# Patient Record
Sex: MALE | Race: OTHER | Employment: OTHER | URBAN - METROPOLITAN AREA
[De-identification: names, ages, dates, MRNs, and addresses within clinical notes are randomized per-mention and may not be internally consistent; named-entity substitution may affect disease eponyms.]

---

## 2018-01-31 LAB
CHOLESTEROL, TOTAL: 267 MG/DL
CHOLESTEROL/HDL RATIO: 5.8
HDLC SERPL-MCNC: 46 MG/DL (ref 35–70)
LDL CHOLESTEROL CALCULATED: 189 MG/DL (ref 0–160)
TRIGL SERPL-MCNC: 154 MG/DL
VLDLC SERPL CALC-MCNC: 221 MG/DL

## 2018-05-19 ENCOUNTER — APPOINTMENT (OUTPATIENT)
Dept: GENERAL RADIOLOGY | Age: 83
End: 2018-05-19
Payer: MEDICARE

## 2018-05-19 ENCOUNTER — HOSPITAL ENCOUNTER (EMERGENCY)
Age: 83
Discharge: HOME | End: 2018-05-19
Payer: MEDICARE

## 2018-05-19 VITALS
SYSTOLIC BLOOD PRESSURE: 176 MMHG | BODY MASS INDEX: 28.34 KG/M2 | HEART RATE: 100 BPM | HEIGHT: 64 IN | OXYGEN SATURATION: 96 % | WEIGHT: 166 LBS | DIASTOLIC BLOOD PRESSURE: 71 MMHG | TEMPERATURE: 98.6 F | RESPIRATION RATE: 17 BRPM

## 2018-05-19 DIAGNOSIS — S90.111A CONTUSION OF RIGHT GREAT TOE WITHOUT DAMAGE TO NAIL, INITIAL ENCOUNTER: Primary | ICD-10-CM

## 2018-05-19 PROCEDURE — 73630 X-RAY EXAM OF FOOT: CPT

## 2018-05-19 PROCEDURE — 99283 EMERGENCY DEPT VISIT LOW MDM: CPT

## 2019-01-17 ENCOUNTER — OFFICE VISIT (OUTPATIENT)
Dept: ORTHOPEDIC SURGERY | Age: 84
End: 2019-01-17
Payer: MEDICARE

## 2019-01-17 VITALS
DIASTOLIC BLOOD PRESSURE: 68 MMHG | SYSTOLIC BLOOD PRESSURE: 135 MMHG | HEIGHT: 65 IN | HEART RATE: 65 BPM | RESPIRATION RATE: 16 BRPM | WEIGHT: 164 LBS | BODY MASS INDEX: 27.32 KG/M2

## 2019-01-17 DIAGNOSIS — M19.032 ARTHRITIS OF LEFT WRIST: Primary | ICD-10-CM

## 2019-01-17 PROCEDURE — 20605 DRAIN/INJ JOINT/BURSA W/O US: CPT | Performed by: ORTHOPAEDIC SURGERY

## 2019-01-17 PROCEDURE — 99203 OFFICE O/P NEW LOW 30 MIN: CPT | Performed by: ORTHOPAEDIC SURGERY

## 2019-01-17 RX ORDER — BETAMETHASONE SODIUM PHOSPHATE AND BETAMETHASONE ACETATE 3; 3 MG/ML; MG/ML
6 INJECTION, SUSPENSION INTRA-ARTICULAR; INTRALESIONAL; INTRAMUSCULAR; SOFT TISSUE ONCE
Status: COMPLETED | OUTPATIENT
Start: 2019-01-17 | End: 2019-01-17

## 2019-01-17 RX ORDER — LOSARTAN POTASSIUM 100 MG/1
100 TABLET ORAL DAILY
COMMUNITY
End: 2019-08-20 | Stop reason: SDUPTHER

## 2019-01-17 RX ADMIN — BETAMETHASONE SODIUM PHOSPHATE AND BETAMETHASONE ACETATE 6 MG: 3; 3 INJECTION, SUSPENSION INTRA-ARTICULAR; INTRALESIONAL; INTRAMUSCULAR; SOFT TISSUE at 09:17

## 2019-01-31 LAB
AVERAGE GLUCOSE: NORMAL
CHOLESTEROL, TOTAL: 267 MG/DL
CHOLESTEROL/HDL RATIO: 5.8
HBA1C MFR BLD: 11 %
HDLC SERPL-MCNC: 46 MG/DL (ref 35–70)
LDL CHOLESTEROL CALCULATED: 189 MG/DL (ref 0–160)
TRIGL SERPL-MCNC: 154 MG/DL
VLDLC SERPL CALC-MCNC: 221 MG/DL

## 2019-07-25 ENCOUNTER — TELEPHONE (OUTPATIENT)
Dept: PRIMARY CARE CLINIC | Age: 84
End: 2019-07-25

## 2019-07-25 NOTE — TELEPHONE ENCOUNTER
This pt was a previous pt of Dr Kin Good and his cousin Ramón Hilton sees Dr Perez Jacob. During her appt, she asked Dr Perez Jacob if he would see this pt and he agreed. I did confirm with  that he will in fact accept pt. Please call Daniel Goode at [phone] to schedule this appt. Thanks!

## 2019-08-20 ENCOUNTER — OFFICE VISIT (OUTPATIENT)
Dept: PRIMARY CARE CLINIC | Age: 84
End: 2019-08-20
Payer: MEDICARE

## 2019-08-20 VITALS — WEIGHT: 163.4 LBS | BODY MASS INDEX: 27.9 KG/M2 | TEMPERATURE: 98.4 F | HEIGHT: 64 IN

## 2019-08-20 DIAGNOSIS — E10.65 UNCONTROLLED TYPE 1 DIABETES MELLITUS WITH HYPERGLYCEMIA (HCC): ICD-10-CM

## 2019-08-20 DIAGNOSIS — I10 ESSENTIAL HYPERTENSION: Primary | ICD-10-CM

## 2019-08-20 DIAGNOSIS — M1A.09X0 CHRONIC GOUT OF MULTIPLE SITES, UNSPECIFIED CAUSE: ICD-10-CM

## 2019-08-20 DIAGNOSIS — M81.0 AGE-RELATED OSTEOPOROSIS WITHOUT CURRENT PATHOLOGICAL FRACTURE: ICD-10-CM

## 2019-08-20 DIAGNOSIS — Z12.5 PROSTATE CANCER SCREENING: ICD-10-CM

## 2019-08-20 DIAGNOSIS — F51.01 PRIMARY INSOMNIA: ICD-10-CM

## 2019-08-20 DIAGNOSIS — E03.9 ACQUIRED HYPOTHYROIDISM: ICD-10-CM

## 2019-08-20 DIAGNOSIS — E78.2 MIXED HYPERLIPIDEMIA: ICD-10-CM

## 2019-08-20 PROCEDURE — 99214 OFFICE O/P EST MOD 30 MIN: CPT | Performed by: FAMILY MEDICINE

## 2019-08-20 RX ORDER — MIRTAZAPINE 7.5 MG/1
7.5 TABLET, FILM COATED ORAL NIGHTLY
Qty: 90 TABLET | Refills: 1 | Status: SHIPPED | OUTPATIENT
Start: 2019-08-20 | End: 2020-10-16

## 2019-08-20 RX ORDER — LOSARTAN POTASSIUM 100 MG/1
100 TABLET ORAL DAILY
Qty: 90 TABLET | Refills: 5 | Status: SHIPPED | OUTPATIENT
Start: 2019-08-20 | End: 2020-10-16 | Stop reason: SDUPTHER

## 2019-08-20 NOTE — PROGRESS NOTES
injection vial, Inject 60 Units into the skin nightly, Disp: 4 vial, Rfl: 5    mirtazapine (REMERON) 7.5 MG tablet, Take 1 tablet by mouth nightly, Disp: 90 tablet, Rfl: 1  No Known Allergies  Social History     Socioeconomic History    Marital status:      Spouse name: Not on file    Number of children: Not on file    Years of education: Not on file    Highest education level: Not on file   Occupational History    Not on file   Social Needs    Financial resource strain: Not on file    Food insecurity:     Worry: Not on file     Inability: Not on file    Transportation needs:     Medical: Not on file     Non-medical: Not on file   Tobacco Use    Smoking status: Never Smoker    Smokeless tobacco: Never Used   Substance and Sexual Activity    Alcohol use: No    Drug use: No    Sexual activity: Not on file   Lifestyle    Physical activity:     Days per week: Not on file     Minutes per session: Not on file    Stress: Not on file   Relationships    Social connections:     Talks on phone: Not on file     Gets together: Not on file     Attends Latter-day service: Not on file     Active member of club or organization: Not on file     Attends meetings of clubs or organizations: Not on file     Relationship status: Not on file    Intimate partner violence:     Fear of current or ex partner: Not on file     Emotionally abused: Not on file     Physically abused: Not on file     Forced sexual activity: Not on file   Other Topics Concern    Not on file   Social History Narrative     for 10 years. 3 children with his first wife. Diabetes since 1984. Hypertension    Insomnia    Hyperlipidemia with last cholesterol 260 7 January 2019    Diabetic neuropathy    Osteoarthritis    Negative carotid ultrasound August 2018    Constipation taking Linzess every 3 days    Retired     Colonoscopy done few years ago. Osteoarthritis and chronic pain off Vicodin at age 54.   Was on for 17 by mouth nightly    Prostate cancer screening  -     Psa screening; Future        Comments: Him get laboratory studies and see me back in 1week. We will discuss at that time Metamucil his wife says he is reluctant to take anything. He takes Linzess every 3 days and that works well for him. We will do an EKG next visit. He has not been to a cardiologist.  Diagnosis in the records to assess coronary artery disease but no testing to document that. A great deal of time was spent reviewing old records and establishing treatment protocol and treatment plan majority of the time spent on face-to-face exam and education. A great deal of time spent reviewing medications, diet, exercise, social issues. Also reviewing the chart before entering the room with patient and finishing charting after leaving patient's room. More than half of that time was spent face to face with the patient in counseling and coordinating care. Follow Up: Return for 10 days with ekg  that day.      Seen by:  Amna Mathews DO

## 2019-08-21 ASSESSMENT — ENCOUNTER SYMPTOMS
CONSTIPATION: 1
RESPIRATORY NEGATIVE: 1
SHORTNESS OF BREATH: 0
ALLERGIC/IMMUNOLOGIC NEGATIVE: 1
EYES NEGATIVE: 1
CHEST TIGHTNESS: 0

## 2019-08-22 ENCOUNTER — HOSPITAL ENCOUNTER (OUTPATIENT)
Age: 84
Discharge: HOME | End: 2019-08-24
Payer: MEDICARE

## 2019-08-22 DIAGNOSIS — E78.2 MIXED HYPERLIPIDEMIA: ICD-10-CM

## 2019-08-22 DIAGNOSIS — M1A.09X0 CHRONIC GOUT OF MULTIPLE SITES, UNSPECIFIED CAUSE: ICD-10-CM

## 2019-08-22 DIAGNOSIS — I10 ESSENTIAL HYPERTENSION: ICD-10-CM

## 2019-08-22 DIAGNOSIS — E10.65 UNCONTROLLED TYPE 1 DIABETES MELLITUS WITH HYPERGLYCEMIA (HCC): ICD-10-CM

## 2019-08-22 DIAGNOSIS — Z12.5 PROSTATE CANCER SCREENING: ICD-10-CM

## 2019-08-22 DIAGNOSIS — M81.0 AGE-RELATED OSTEOPOROSIS WITHOUT CURRENT PATHOLOGICAL FRACTURE: ICD-10-CM

## 2019-08-22 DIAGNOSIS — E03.9 ACQUIRED HYPOTHYROIDISM: ICD-10-CM

## 2019-08-22 LAB
ALBUMIN SERPL-MCNC: 4.2 G/DL (ref 3.5–5.2)
ALP BLD-CCNC: 69 U/L (ref 40–129)
ALT SERPL-CCNC: 16 U/L (ref 0–40)
ANION GAP SERPL CALCULATED.3IONS-SCNC: 11 MMOL/L (ref 7–16)
AST SERPL-CCNC: 17 U/L (ref 0–39)
BASOPHILS ABSOLUTE: 0.06 E9/L (ref 0–0.2)
BASOPHILS RELATIVE PERCENT: 0.7 % (ref 0–2)
BILIRUB SERPL-MCNC: 0.3 MG/DL (ref 0–1.2)
BILIRUBIN URINE: NEGATIVE
BLOOD, URINE: NEGATIVE
BUN BLDV-MCNC: 17 MG/DL (ref 8–23)
BURR CELLS: ABNORMAL
CALCIUM SERPL-MCNC: 9.4 MG/DL (ref 8.6–10.2)
CHLORIDE BLD-SCNC: 102 MMOL/L (ref 98–107)
CHOLESTEROL, TOTAL: 217 MG/DL (ref 0–199)
CLARITY: CLEAR
CO2: 27 MMOL/L (ref 22–29)
COLOR: YELLOW
CREAT SERPL-MCNC: 1 MG/DL (ref 0.7–1.2)
EOSINOPHILS ABSOLUTE: 2.24 E9/L (ref 0.05–0.5)
EOSINOPHILS RELATIVE PERCENT: 24.9 % (ref 0–6)
GFR AFRICAN AMERICAN: >60
GFR NON-AFRICAN AMERICAN: >60 ML/MIN/1.73
GLUCOSE BLD-MCNC: 145 MG/DL (ref 74–99)
GLUCOSE URINE: NEGATIVE MG/DL
HBA1C MFR BLD: 10.5 % (ref 4–5.6)
HCT VFR BLD CALC: 47.2 % (ref 37–54)
HDLC SERPL-MCNC: 33 MG/DL
HEMOGLOBIN: 14.6 G/DL (ref 12.5–16.5)
IMMATURE GRANULOCYTES #: 0.02 E9/L
IMMATURE GRANULOCYTES %: 0.2 % (ref 0–5)
KETONES, URINE: NEGATIVE MG/DL
LDL CHOLESTEROL CALCULATED: 148 MG/DL (ref 0–99)
LEUKOCYTE ESTERASE, URINE: NEGATIVE
LYMPHOCYTES ABSOLUTE: 2.11 E9/L (ref 1.5–4)
LYMPHOCYTES RELATIVE PERCENT: 23.4 % (ref 20–42)
MCH RBC QN AUTO: 27.2 PG (ref 26–35)
MCHC RBC AUTO-ENTMCNC: 30.9 % (ref 32–34.5)
MCV RBC AUTO: 88.1 FL (ref 80–99.9)
MICROALBUMIN UR-MCNC: 46.7 MG/L
MONOCYTES ABSOLUTE: 0.92 E9/L (ref 0.1–0.95)
MONOCYTES RELATIVE PERCENT: 10.2 % (ref 2–12)
NEUTROPHILS ABSOLUTE: 3.66 E9/L (ref 1.8–7.3)
NEUTROPHILS RELATIVE PERCENT: 40.6 % (ref 43–80)
NITRITE, URINE: NEGATIVE
PDW BLD-RTO: 14.7 FL (ref 11.5–15)
PH UA: 6 (ref 5–9)
PLATELET # BLD: 161 E9/L (ref 130–450)
PMV BLD AUTO: 14.4 FL (ref 7–12)
POIKILOCYTES: ABNORMAL
POTASSIUM SERPL-SCNC: 4.6 MMOL/L (ref 3.5–5)
PROSTATE SPECIFIC ANTIGEN: 1.29 NG/ML (ref 0–4)
PROTEIN UA: NEGATIVE MG/DL
RBC # BLD: 5.36 E12/L (ref 3.8–5.8)
SODIUM BLD-SCNC: 140 MMOL/L (ref 132–146)
SPECIFIC GRAVITY UA: <=1.005 (ref 1–1.03)
T4 TOTAL: 5.9 MCG/DL (ref 4.5–11.7)
TOTAL PROTEIN: 7.7 G/DL (ref 6.4–8.3)
TRIGL SERPL-MCNC: 180 MG/DL (ref 0–149)
TSH SERPL DL<=0.05 MIU/L-ACNC: 3.5 UIU/ML (ref 0.27–4.2)
URIC ACID, SERUM: 5.1 MG/DL (ref 3.4–7)
UROBILINOGEN, URINE: 0.2 E.U./DL
VITAMIN D 25-HYDROXY: 24 NG/ML (ref 30–100)
VLDLC SERPL CALC-MCNC: 36 MG/DL
WBC # BLD: 9 E9/L (ref 4.5–11.5)

## 2019-08-22 PROCEDURE — 85025 COMPLETE CBC W/AUTO DIFF WBC: CPT

## 2019-08-22 PROCEDURE — 84550 ASSAY OF BLOOD/URIC ACID: CPT

## 2019-08-22 PROCEDURE — 82044 UR ALBUMIN SEMIQUANTITATIVE: CPT

## 2019-08-22 PROCEDURE — 80061 LIPID PANEL: CPT

## 2019-08-22 PROCEDURE — 84436 ASSAY OF TOTAL THYROXINE: CPT

## 2019-08-22 PROCEDURE — 36415 COLL VENOUS BLD VENIPUNCTURE: CPT

## 2019-08-22 PROCEDURE — 81003 URINALYSIS AUTO W/O SCOPE: CPT

## 2019-08-22 PROCEDURE — G0103 PSA SCREENING: HCPCS

## 2019-08-22 PROCEDURE — 80053 COMPREHEN METABOLIC PANEL: CPT

## 2019-08-22 PROCEDURE — 83036 HEMOGLOBIN GLYCOSYLATED A1C: CPT

## 2019-08-22 PROCEDURE — 84443 ASSAY THYROID STIM HORMONE: CPT

## 2019-08-22 PROCEDURE — 82306 VITAMIN D 25 HYDROXY: CPT

## 2019-08-26 RX ORDER — BENZONATATE 100 MG/1
100 CAPSULE ORAL 3 TIMES DAILY PRN
COMMUNITY
End: 2019-08-30

## 2019-08-26 RX ORDER — BROMPHENIRAMINE MALEATE, PSEUDOEPHEDRINE HYDROCHLORIDE, AND DEXTROMETHORPHAN HYDROBROMIDE 2; 30; 10 MG/5ML; MG/5ML; MG/5ML
5 SYRUP ORAL
COMMUNITY
End: 2019-08-30

## 2019-08-30 ENCOUNTER — OFFICE VISIT (OUTPATIENT)
Dept: PRIMARY CARE CLINIC | Age: 84
End: 2019-08-30
Payer: MEDICARE

## 2019-08-30 ENCOUNTER — TELEPHONE (OUTPATIENT)
Dept: FAMILY MEDICINE CLINIC | Age: 84
End: 2019-08-30

## 2019-08-30 VITALS — WEIGHT: 164.2 LBS | BODY MASS INDEX: 28.03 KG/M2 | HEIGHT: 64 IN | TEMPERATURE: 97.8 F

## 2019-08-30 DIAGNOSIS — I10 ESSENTIAL HYPERTENSION: ICD-10-CM

## 2019-08-30 DIAGNOSIS — R94.31 EKG ABNORMALITIES: ICD-10-CM

## 2019-08-30 DIAGNOSIS — E10.65 UNCONTROLLED TYPE 1 DIABETES MELLITUS WITH HYPERGLYCEMIA (HCC): Primary | ICD-10-CM

## 2019-08-30 DIAGNOSIS — E78.2 MIXED HYPERLIPIDEMIA: ICD-10-CM

## 2019-08-30 DIAGNOSIS — F51.01 PRIMARY INSOMNIA: ICD-10-CM

## 2019-08-30 DIAGNOSIS — R80.1 PERSISTENT PROTEINURIA: ICD-10-CM

## 2019-08-30 PROCEDURE — 99214 OFFICE O/P EST MOD 30 MIN: CPT | Performed by: FAMILY MEDICINE

## 2019-08-30 PROCEDURE — 93000 ELECTROCARDIOGRAM COMPLETE: CPT | Performed by: FAMILY MEDICINE

## 2019-08-30 RX ORDER — PRAVASTATIN SODIUM 20 MG
20 TABLET ORAL DAILY
Qty: 30 TABLET | Refills: 5 | Status: SHIPPED
Start: 2019-08-30 | End: 2020-03-02 | Stop reason: SDUPTHER

## 2019-08-30 ASSESSMENT — ENCOUNTER SYMPTOMS
ALLERGIC/IMMUNOLOGIC NEGATIVE: 1
RESPIRATORY NEGATIVE: 1
GASTROINTESTINAL NEGATIVE: 1
EYES NEGATIVE: 1

## 2019-09-06 ENCOUNTER — OFFICE VISIT (OUTPATIENT)
Dept: PRIMARY CARE CLINIC | Age: 84
End: 2019-09-06
Payer: MEDICARE

## 2019-09-06 VITALS — WEIGHT: 166 LBS | BODY MASS INDEX: 28.34 KG/M2 | HEIGHT: 64 IN | TEMPERATURE: 97.7 F

## 2019-09-06 DIAGNOSIS — E10.65 UNCONTROLLED TYPE 1 DIABETES MELLITUS WITH HYPERGLYCEMIA (HCC): Primary | ICD-10-CM

## 2019-09-06 LAB
CHP ED QC CHECK: NORMAL
GLUCOSE BLD-MCNC: 221 MG/DL

## 2019-09-06 PROCEDURE — 82962 GLUCOSE BLOOD TEST: CPT | Performed by: FAMILY MEDICINE

## 2019-09-06 PROCEDURE — 99213 OFFICE O/P EST LOW 20 MIN: CPT | Performed by: FAMILY MEDICINE

## 2019-09-06 ASSESSMENT — ENCOUNTER SYMPTOMS
EYES NEGATIVE: 1
RESPIRATORY NEGATIVE: 1
ALLERGIC/IMMUNOLOGIC NEGATIVE: 1
GASTROINTESTINAL NEGATIVE: 1

## 2019-09-06 NOTE — PROGRESS NOTES
room. More than half of that time was spent face to face with the patient in counseling and coordinating care. Follow Up: Return for reg.      Seen by:  Neo Way DO

## 2019-09-17 ENCOUNTER — TELEPHONE (OUTPATIENT)
Dept: ADMINISTRATIVE | Age: 84
End: 2019-09-17

## 2019-09-17 NOTE — TELEPHONE ENCOUNTER
New pt referred by Dr. Brent Sanchez for abnormal EKG. Appt scheduled with Dr. Solomon Epps on 9/18/19. No previous cardiologist.  Cardiac past history form scanned.

## 2019-09-18 ENCOUNTER — OFFICE VISIT (OUTPATIENT)
Dept: CARDIOLOGY CLINIC | Age: 84
End: 2019-09-18
Payer: MEDICARE

## 2019-09-18 ENCOUNTER — HOSPITAL ENCOUNTER (OUTPATIENT)
Age: 84
Discharge: HOME | End: 2019-09-20
Payer: MEDICARE

## 2019-09-18 VITALS
HEART RATE: 78 BPM | RESPIRATION RATE: 16 BRPM | BODY MASS INDEX: 28.2 KG/M2 | DIASTOLIC BLOOD PRESSURE: 68 MMHG | WEIGHT: 165.2 LBS | HEIGHT: 64 IN | SYSTOLIC BLOOD PRESSURE: 130 MMHG

## 2019-09-18 DIAGNOSIS — R94.31 EKG ABNORMALITIES: Primary | ICD-10-CM

## 2019-09-18 DIAGNOSIS — R30.0 DYSURIA: ICD-10-CM

## 2019-09-18 LAB
BACTERIA: ABNORMAL /HPF
BILIRUBIN URINE: NEGATIVE
BLOOD, URINE: ABNORMAL
CLARITY: ABNORMAL
COLOR: YELLOW
GLUCOSE URINE: 250 MG/DL
KETONES, URINE: NEGATIVE MG/DL
LEUKOCYTE ESTERASE, URINE: ABNORMAL
NITRITE, URINE: NEGATIVE
PH UA: 7.5 (ref 5–9)
PROTEIN UA: ABNORMAL MG/DL
RBC UA: ABNORMAL /HPF (ref 0–2)
SPECIFIC GRAVITY UA: 1.01 (ref 1–1.03)
UROBILINOGEN, URINE: 1 E.U./DL
WBC UA: >20 /HPF (ref 0–5)

## 2019-09-18 PROCEDURE — 81001 URINALYSIS AUTO W/SCOPE: CPT

## 2019-09-18 PROCEDURE — 87077 CULTURE AEROBIC IDENTIFY: CPT

## 2019-09-18 PROCEDURE — 87088 URINE BACTERIA CULTURE: CPT

## 2019-09-18 PROCEDURE — 81003 URINALYSIS AUTO W/O SCOPE: CPT | Performed by: INTERNAL MEDICINE

## 2019-09-18 PROCEDURE — 87186 SC STD MICRODIL/AGAR DIL: CPT

## 2019-09-18 PROCEDURE — 99204 OFFICE O/P NEW MOD 45 MIN: CPT | Performed by: INTERNAL MEDICINE

## 2019-09-18 PROCEDURE — 93000 ELECTROCARDIOGRAM COMPLETE: CPT | Performed by: INTERNAL MEDICINE

## 2019-09-18 RX ORDER — M-VIT,TX,IRON,MINS/CALC/FOLIC 27MG-0.4MG
1 TABLET ORAL DAILY
COMMUNITY
End: 2019-11-26

## 2019-09-18 RX ORDER — LATANOPROST 50 UG/ML
1 SOLUTION/ DROPS OPHTHALMIC NIGHTLY
COMMUNITY

## 2019-09-18 NOTE — PROGRESS NOTES
No wheezes, rales, or rhonchi. Cardiac: Regular rhythm with a normal rate. S1 & S2 normal, no murmurs  Abdomen: Soft, nontender, +bowel sounds  Extremities: Moves all extremities x 4, no lower extremity edema  Neurologic: No focal motor deficits apparent, normal mood and affect  Peripheral Pulses: Intact posterior tibial pulses bilaterally    Laboratory Tests:  Lab Results   Component Value Date    CREATININE 1.0 08/22/2019    BUN 17 08/22/2019     08/22/2019    K 4.6 08/22/2019     08/22/2019    CO2 27 08/22/2019     No results found for: MG  Lab Results   Component Value Date    WBC 9.0 08/22/2019    HGB 14.6 08/22/2019    HCT 47.2 08/22/2019    MCV 88.1 08/22/2019     08/22/2019     Lab Results   Component Value Date    ALT 16 08/22/2019    AST 17 08/22/2019    ALKPHOS 69 08/22/2019    BILITOT 0.3 08/22/2019     No results found for: CKTOTAL, CKMB, CKMBINDEX, TROPONINI  No results found for: INR, PROTIME  Lab Results   Component Value Date    TSH 3.500 08/22/2019     Lab Results   Component Value Date    LABA1C 10.5 (H) 08/22/2019     No results found for: EAG  Lab Results   Component Value Date    CHOL 217 (H) 08/22/2019    CHOL 267 01/31/2018     Lab Results   Component Value Date    TRIG 180 (H) 08/22/2019    TRIG 154 01/31/2018     Lab Results   Component Value Date    HDL 33 08/22/2019    HDL 46 01/31/2018     Lab Results   Component Value Date    LDLCALC 148 (H) 08/22/2019    LDLCALC 189 (A) 01/31/2018     Lab Results   Component Value Date    LABVLDL 36 08/22/2019    VLDL 221 01/31/2018     Lab Results   Component Value Date    CHOLHDLRATIO 5.8 01/31/2018     No results for input(s): PROBNP in the last 72 hours. Cardiac Tests:  ECG: Sinus rhythm 78 bpm.  Left bundle branch block with a QRS duration 128 ms with associated repolarization abnormalities.     Echocardiogram: None    Stress test: Thinks he may have had one many years ago    Cardiac catheterization: None    Orders Placed

## 2019-09-19 ENCOUNTER — TELEPHONE (OUTPATIENT)
Dept: CARDIOLOGY CLINIC | Age: 84
End: 2019-09-19

## 2019-09-19 ENCOUNTER — TELEPHONE (OUTPATIENT)
Dept: PRIMARY CARE CLINIC | Age: 84
End: 2019-09-19

## 2019-09-20 RX ORDER — NITROFURANTOIN 25; 75 MG/1; MG/1
100 CAPSULE ORAL 2 TIMES DAILY
Qty: 14 CAPSULE | Refills: 0 | Status: SHIPPED | OUTPATIENT
Start: 2019-09-20 | End: 2019-09-30

## 2019-09-21 LAB
ORGANISM: ABNORMAL
URINE CULTURE, ROUTINE: ABNORMAL

## 2019-09-23 ENCOUNTER — TELEPHONE (OUTPATIENT)
Dept: CARDIOLOGY | Age: 84
End: 2019-09-23

## 2019-09-25 ENCOUNTER — HOSPITAL ENCOUNTER (OUTPATIENT)
Dept: CARDIOLOGY | Age: 84
Discharge: HOME | End: 2019-09-25
Payer: MEDICARE

## 2019-09-25 VITALS
HEIGHT: 64 IN | WEIGHT: 164 LBS | HEART RATE: 75 BPM | BODY MASS INDEX: 28 KG/M2 | DIASTOLIC BLOOD PRESSURE: 74 MMHG | SYSTOLIC BLOOD PRESSURE: 132 MMHG

## 2019-09-25 DIAGNOSIS — R94.31 EKG ABNORMALITIES: ICD-10-CM

## 2019-09-25 PROCEDURE — 78452 HT MUSCLE IMAGE SPECT MULT: CPT

## 2019-09-25 PROCEDURE — A9500 TC99M SESTAMIBI: HCPCS | Performed by: INTERNAL MEDICINE

## 2019-09-25 PROCEDURE — 2580000003 HC RX 258: Performed by: INTERNAL MEDICINE

## 2019-09-25 PROCEDURE — 93017 CV STRESS TEST TRACING ONLY: CPT

## 2019-09-25 PROCEDURE — 6360000002 HC RX W HCPCS: Performed by: INTERNAL MEDICINE

## 2019-09-25 PROCEDURE — 3430000000 HC RX DIAGNOSTIC RADIOPHARMACEUTICAL: Performed by: INTERNAL MEDICINE

## 2019-09-25 RX ORDER — SODIUM CHLORIDE 0.9 % (FLUSH) 0.9 %
10 SYRINGE (ML) INJECTION PRN
Status: DISCONTINUED | OUTPATIENT
Start: 2019-09-25 | End: 2019-09-26 | Stop reason: HOSPADM

## 2019-09-25 RX ADMIN — Medication 10 MILLICURIE: at 06:58

## 2019-09-25 RX ADMIN — REGADENOSON 0.4 MG: 0.08 INJECTION, SOLUTION INTRAVENOUS at 08:40

## 2019-09-25 RX ADMIN — Medication 30 MILLICURIE: at 08:40

## 2019-09-25 RX ADMIN — Medication 10 ML: at 06:58

## 2019-09-25 RX ADMIN — Medication 10 ML: at 08:41

## 2019-09-25 RX ADMIN — Medication 10 ML: at 08:40

## 2019-09-25 NOTE — PROCEDURES
diaphragmatic attenuation. The gated SPECT stress imaging in the short, vertical long, and horizontal long axis demonstrated normal homogeneous tracer distribution throughout the myocardium. A moderate defect was present in the apex wall that was  moderate sized by quantification. The resting images show no change. Gated SPECT left ventricular ejection fraction was calculated to be 44%, with normal myocardial thickening and wall motion and hypokinesis of the apex wall. Impression:    1. ECG during the infusion did not change. 2. The myocardial perfusion imaging was abnormal.    The abnormality was a a moderate sized fixed defect in the apical wall suggestive of a prior MI\  3. Overall left ventricular systolic function was abnormal with regional wall motion abnormalities. 4. Low risk general pharmacologic stress test.    Thank you for sending your patient to this [facility].      Electronically signed by Evie Weathers DO on 9/25/19 at 12:04 PM

## 2019-09-26 DIAGNOSIS — I25.10 CORONARY ARTERY DISEASE INVOLVING NATIVE CORONARY ARTERY OF NATIVE HEART WITHOUT ANGINA PECTORIS: ICD-10-CM

## 2019-09-26 DIAGNOSIS — R94.30 EJECTION FRACTION < 50%: Primary | ICD-10-CM

## 2019-09-26 NOTE — TELEPHONE ENCOUNTER
Left message for patient to contact office. Echo order placed EPIC.   Coreg script sent to [facility] on [address].      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

## 2019-09-30 ENCOUNTER — OFFICE VISIT (OUTPATIENT)
Dept: PRIMARY CARE CLINIC | Age: 84
End: 2019-09-30
Payer: MEDICARE

## 2019-09-30 VITALS — HEIGHT: 64 IN | WEIGHT: 165 LBS | TEMPERATURE: 97.6 F | BODY MASS INDEX: 28.17 KG/M2

## 2019-09-30 DIAGNOSIS — I10 ESSENTIAL HYPERTENSION: Chronic | ICD-10-CM

## 2019-09-30 DIAGNOSIS — I25.10 CORONARY ARTERY DISEASE INVOLVING NATIVE CORONARY ARTERY OF NATIVE HEART WITHOUT ANGINA PECTORIS: ICD-10-CM

## 2019-09-30 DIAGNOSIS — E78.2 MIXED HYPERLIPIDEMIA: Chronic | ICD-10-CM

## 2019-09-30 DIAGNOSIS — E10.65 UNCONTROLLED TYPE 1 DIABETES MELLITUS WITH HYPERGLYCEMIA (HCC): Primary | ICD-10-CM

## 2019-09-30 LAB
CHP ED QC CHECK: NORMAL
GLUCOSE BLD-MCNC: 78 MG/DL

## 2019-09-30 PROCEDURE — 99214 OFFICE O/P EST MOD 30 MIN: CPT | Performed by: FAMILY MEDICINE

## 2019-09-30 PROCEDURE — 90653 IIV ADJUVANT VACCINE IM: CPT | Performed by: FAMILY MEDICINE

## 2019-09-30 PROCEDURE — 82962 GLUCOSE BLOOD TEST: CPT | Performed by: FAMILY MEDICINE

## 2019-09-30 PROCEDURE — G0008 ADMIN INFLUENZA VIRUS VAC: HCPCS | Performed by: FAMILY MEDICINE

## 2019-09-30 RX ORDER — CARVEDILOL 3.12 MG/1
3.12 TABLET ORAL 2 TIMES DAILY
Qty: 60 TABLET | Refills: 5 | Status: SHIPPED | OUTPATIENT
Start: 2019-09-30 | End: 2019-11-26 | Stop reason: DRUGHIGH

## 2019-09-30 ASSESSMENT — PATIENT HEALTH QUESTIONNAIRE - PHQ9
SUM OF ALL RESPONSES TO PHQ QUESTIONS 1-9: 0
2. FEELING DOWN, DEPRESSED OR HOPELESS: 0
SUM OF ALL RESPONSES TO PHQ9 QUESTIONS 1 & 2: 0
1. LITTLE INTEREST OR PLEASURE IN DOING THINGS: 0
SUM OF ALL RESPONSES TO PHQ QUESTIONS 1-9: 0

## 2019-09-30 ASSESSMENT — ENCOUNTER SYMPTOMS
ALLERGIC/IMMUNOLOGIC NEGATIVE: 1
GASTROINTESTINAL NEGATIVE: 1
RESPIRATORY NEGATIVE: 1
EYES NEGATIVE: 1

## 2019-09-30 NOTE — PROGRESS NOTES
normal mood and affect. His behavior is normal.   Vitals reviewed. Montez Cooks was seen today for diabetes. Diagnoses and all orders for this visit:    Uncontrolled type 1 diabetes mellitus with hyperglycemia (HCC)  -     POCT Glucose  -     Insulin Syringes, Disposable, U-100 0.3 ML MISC; 1 each by Does not apply route 3 times daily    Essential hypertension    Mixed hyperlipidemia    Coronary artery disease involving native coronary artery of native heart without angina pectoris        Comments: cont same  Watch   For uti   Return   Await  Eco   Diet e xer   Hm isues    A great deal of time spent reviewing medications, diet, exercise, social issues. Also reviewing the chart before entering the room with patient and finishing charting after leaving patient's room. More than half of that time was spent face to face with the patient in counseling and coordinating care. Follow Up: Return in about 6 weeks (around 11/11/2019) for with bs.      Seen by:  DO Arabella

## 2019-10-04 ENCOUNTER — HOSPITAL ENCOUNTER (EMERGENCY)
Age: 84
Discharge: HOME | End: 2019-10-04
Attending: EMERGENCY MEDICINE
Payer: MEDICARE

## 2019-10-04 ENCOUNTER — APPOINTMENT (OUTPATIENT)
Dept: CT IMAGING | Age: 84
End: 2019-10-04
Payer: MEDICARE

## 2019-10-04 ENCOUNTER — APPOINTMENT (OUTPATIENT)
Dept: GENERAL RADIOLOGY | Age: 84
End: 2019-10-04
Payer: MEDICARE

## 2019-10-04 VITALS
TEMPERATURE: 97.4 F | HEIGHT: 64 IN | RESPIRATION RATE: 16 BRPM | DIASTOLIC BLOOD PRESSURE: 62 MMHG | WEIGHT: 165 LBS | HEART RATE: 80 BPM | SYSTOLIC BLOOD PRESSURE: 145 MMHG | OXYGEN SATURATION: 96 % | BODY MASS INDEX: 28.17 KG/M2

## 2019-10-04 DIAGNOSIS — R07.9 CHEST PAIN, UNSPECIFIED TYPE: ICD-10-CM

## 2019-10-04 DIAGNOSIS — M79.605 LEG PAIN, ANTERIOR, LEFT: Primary | ICD-10-CM

## 2019-10-04 DIAGNOSIS — N30.00 ACUTE CYSTITIS WITHOUT HEMATURIA: ICD-10-CM

## 2019-10-04 LAB
ANION GAP SERPL CALCULATED.3IONS-SCNC: 17 MMOL/L (ref 7–16)
BACTERIA: ABNORMAL /HPF
BILIRUBIN URINE: NEGATIVE
BLOOD, URINE: NEGATIVE
BUN BLDV-MCNC: 15 MG/DL (ref 8–23)
CALCIUM SERPL-MCNC: 8.8 MG/DL (ref 8.6–10.2)
CHLORIDE BLD-SCNC: 98 MMOL/L (ref 98–107)
CLARITY: ABNORMAL
CO2: 21 MMOL/L (ref 22–29)
COLOR: YELLOW
CREAT SERPL-MCNC: 0.9 MG/DL (ref 0.7–1.2)
EKG ATRIAL RATE: 89 BPM
EKG P AXIS: 63 DEGREES
EKG P-R INTERVAL: 150 MS
EKG Q-T INTERVAL: 402 MS
EKG QRS DURATION: 126 MS
EKG QTC CALCULATION (BAZETT): 489 MS
EKG R AXIS: 43 DEGREES
EKG T AXIS: 78 DEGREES
EKG VENTRICULAR RATE: 89 BPM
GFR AFRICAN AMERICAN: >60
GFR NON-AFRICAN AMERICAN: >60 ML/MIN/1.73
GLUCOSE BLD-MCNC: 224 MG/DL (ref 74–99)
GLUCOSE URINE: NEGATIVE MG/DL
HCT VFR BLD CALC: 40.8 % (ref 37–54)
HEMOGLOBIN: 12.8 G/DL (ref 12.5–16.5)
KETONES, URINE: NEGATIVE MG/DL
LEUKOCYTE ESTERASE, URINE: ABNORMAL
MCH RBC QN AUTO: 26.7 PG (ref 26–35)
MCHC RBC AUTO-ENTMCNC: 31.4 % (ref 32–34.5)
MCV RBC AUTO: 85 FL (ref 80–99.9)
NITRITE, URINE: NEGATIVE
PDW BLD-RTO: 14.7 FL (ref 11.5–15)
PH UA: 7 (ref 5–9)
PLATELET # BLD: 193 E9/L (ref 130–450)
PMV BLD AUTO: 13.4 FL (ref 7–12)
POTASSIUM SERPL-SCNC: 4.9 MMOL/L (ref 3.5–5)
PRO-BNP: 374 PG/ML (ref 0–450)
PROTEIN UA: NEGATIVE MG/DL
RBC # BLD: 4.8 E12/L (ref 3.8–5.8)
RBC UA: ABNORMAL /HPF (ref 0–2)
SODIUM BLD-SCNC: 136 MMOL/L (ref 132–146)
SPECIFIC GRAVITY UA: 1.01 (ref 1–1.03)
TROPONIN: <0.01 NG/ML (ref 0–0.03)
UROBILINOGEN, URINE: 0.2 E.U./DL
WBC # BLD: 18.6 E9/L (ref 4.5–11.5)
WBC UA: >20 /HPF (ref 0–5)

## 2019-10-04 PROCEDURE — 71045 X-RAY EXAM CHEST 1 VIEW: CPT

## 2019-10-04 PROCEDURE — 2580000003 HC RX 258: Performed by: RADIOLOGY

## 2019-10-04 PROCEDURE — 93010 ELECTROCARDIOGRAM REPORT: CPT | Performed by: INTERNAL MEDICINE

## 2019-10-04 PROCEDURE — 99285 EMERGENCY DEPT VISIT HI MDM: CPT

## 2019-10-04 PROCEDURE — 83880 ASSAY OF NATRIURETIC PEPTIDE: CPT

## 2019-10-04 PROCEDURE — 6370000000 HC RX 637 (ALT 250 FOR IP): Performed by: PHYSICIAN ASSISTANT

## 2019-10-04 PROCEDURE — 36415 COLL VENOUS BLD VENIPUNCTURE: CPT

## 2019-10-04 PROCEDURE — 85027 COMPLETE CBC AUTOMATED: CPT

## 2019-10-04 PROCEDURE — 71275 CT ANGIOGRAPHY CHEST: CPT

## 2019-10-04 PROCEDURE — 84484 ASSAY OF TROPONIN QUANT: CPT

## 2019-10-04 PROCEDURE — 80048 BASIC METABOLIC PNL TOTAL CA: CPT

## 2019-10-04 PROCEDURE — 81001 URINALYSIS AUTO W/SCOPE: CPT

## 2019-10-04 PROCEDURE — 93005 ELECTROCARDIOGRAM TRACING: CPT | Performed by: PHYSICIAN ASSISTANT

## 2019-10-04 PROCEDURE — 6360000004 HC RX CONTRAST MEDICATION: Performed by: RADIOLOGY

## 2019-10-04 RX ORDER — ASPIRIN 81 MG/1
324 TABLET, CHEWABLE ORAL ONCE
Status: COMPLETED | OUTPATIENT
Start: 2019-10-04 | End: 2019-10-04

## 2019-10-04 RX ORDER — CEFDINIR 300 MG/1
300 CAPSULE ORAL 2 TIMES DAILY
Qty: 20 CAPSULE | Refills: 0 | Status: SHIPPED | OUTPATIENT
Start: 2019-10-04 | End: 2019-10-14

## 2019-10-04 RX ORDER — SODIUM CHLORIDE 0.9 % (FLUSH) 0.9 %
10 SYRINGE (ML) INJECTION PRN
Status: DISCONTINUED | OUTPATIENT
Start: 2019-10-04 | End: 2019-10-04 | Stop reason: HOSPADM

## 2019-10-04 RX ADMIN — Medication 10 ML: at 04:14

## 2019-10-04 RX ADMIN — IOPAMIDOL 60 ML: 755 INJECTION, SOLUTION INTRAVENOUS at 04:14

## 2019-10-04 RX ADMIN — ASPIRIN 81 MG CHEWABLE TABLET 324 MG: 81 TABLET CHEWABLE at 03:05

## 2019-10-04 ASSESSMENT — PAIN DESCRIPTION - DESCRIPTORS
DESCRIPTORS: OTHER (COMMENT)
DESCRIPTORS: SHOOTING

## 2019-10-04 ASSESSMENT — PAIN DESCRIPTION - FREQUENCY
FREQUENCY: CONTINUOUS
FREQUENCY: CONTINUOUS

## 2019-10-04 ASSESSMENT — PAIN SCALES - GENERAL
PAINLEVEL_OUTOF10: 8
PAINLEVEL_OUTOF10: 6

## 2019-10-04 ASSESSMENT — PAIN DESCRIPTION - LOCATION
LOCATION: LEG
LOCATION: CHEST;LEG;ABDOMEN

## 2019-10-04 ASSESSMENT — PAIN DESCRIPTION - PROGRESSION
CLINICAL_PROGRESSION: GRADUALLY IMPROVING
CLINICAL_PROGRESSION: NOT CHANGED

## 2019-10-04 ASSESSMENT — PAIN DESCRIPTION - ORIENTATION
ORIENTATION: LEFT
ORIENTATION: LEFT

## 2019-10-04 ASSESSMENT — PAIN DESCRIPTION - PAIN TYPE
TYPE: ACUTE PAIN
TYPE: ACUTE PAIN

## 2019-10-04 ASSESSMENT — PAIN DESCRIPTION - ONSET: ONSET: ON-GOING

## 2019-10-17 ENCOUNTER — TELEPHONE (OUTPATIENT)
Dept: CARDIOLOGY | Age: 84
End: 2019-10-17

## 2019-10-21 ENCOUNTER — HOSPITAL ENCOUNTER (OUTPATIENT)
Dept: CARDIOLOGY | Age: 84
Discharge: HOME | End: 2019-10-21
Payer: MEDICARE

## 2019-10-21 DIAGNOSIS — R94.30 EJECTION FRACTION < 50%: ICD-10-CM

## 2019-10-21 DIAGNOSIS — I25.10 CORONARY ARTERY DISEASE INVOLVING NATIVE CORONARY ARTERY OF NATIVE HEART WITHOUT ANGINA PECTORIS: ICD-10-CM

## 2019-10-21 LAB
LV EF: 50 %
LVEF MODALITY: NORMAL

## 2019-10-21 PROCEDURE — 93306 TTE W/DOPPLER COMPLETE: CPT

## 2019-10-23 ENCOUNTER — TELEPHONE (OUTPATIENT)
Dept: CARDIOLOGY CLINIC | Age: 84
End: 2019-10-23

## 2019-11-11 ENCOUNTER — OFFICE VISIT (OUTPATIENT)
Dept: PRIMARY CARE CLINIC | Age: 84
End: 2019-11-11
Payer: MEDICARE

## 2019-11-11 ENCOUNTER — HOSPITAL ENCOUNTER (OUTPATIENT)
Age: 84
Discharge: HOME | End: 2019-11-13
Payer: MEDICARE

## 2019-11-11 DIAGNOSIS — E10.65 UNCONTROLLED TYPE 1 DIABETES MELLITUS WITH HYPERGLYCEMIA (HCC): Primary | ICD-10-CM

## 2019-11-11 DIAGNOSIS — N30.00 ACUTE CYSTITIS WITHOUT HEMATURIA: ICD-10-CM

## 2019-11-11 DIAGNOSIS — Z23 NEED FOR PNEUMOCOCCAL VACCINATION: ICD-10-CM

## 2019-11-11 DIAGNOSIS — E10.65 UNCONTROLLED TYPE 1 DIABETES MELLITUS WITH HYPERGLYCEMIA (HCC): ICD-10-CM

## 2019-11-11 LAB
ALBUMIN SERPL-MCNC: 4.4 G/DL (ref 3.5–5.2)
ALP BLD-CCNC: 67 U/L (ref 40–129)
ALT SERPL-CCNC: 23 U/L (ref 0–40)
ANION GAP SERPL CALCULATED.3IONS-SCNC: 14 MMOL/L (ref 7–16)
AST SERPL-CCNC: 24 U/L (ref 0–39)
BACTERIA: ABNORMAL /HPF
BASOPHILS ABSOLUTE: 0.06 E9/L (ref 0–0.2)
BASOPHILS RELATIVE PERCENT: 0.7 % (ref 0–2)
BILIRUB SERPL-MCNC: 0.5 MG/DL (ref 0–1.2)
BILIRUBIN URINE: NEGATIVE
BLOOD, URINE: ABNORMAL
BUN BLDV-MCNC: 17 MG/DL (ref 8–23)
CALCIUM SERPL-MCNC: 9.7 MG/DL (ref 8.6–10.2)
CHLORIDE BLD-SCNC: 102 MMOL/L (ref 98–107)
CHOLESTEROL, TOTAL: 164 MG/DL (ref 0–199)
CHP ED QC CHECK: NORMAL
CLARITY: ABNORMAL
CO2: 27 MMOL/L (ref 22–29)
COLOR: YELLOW
CREAT SERPL-MCNC: 0.9 MG/DL (ref 0.7–1.2)
EOSINOPHILS ABSOLUTE: 1.36 E9/L (ref 0.05–0.5)
EOSINOPHILS RELATIVE PERCENT: 16.1 % (ref 0–6)
GFR AFRICAN AMERICAN: >60
GFR NON-AFRICAN AMERICAN: >60 ML/MIN/1.73
GLUCOSE BLD-MCNC: 103 MG/DL
GLUCOSE BLD-MCNC: 124 MG/DL (ref 74–99)
GLUCOSE URINE: NEGATIVE MG/DL
HBA1C MFR BLD: 8.6 % (ref 4–5.6)
HCT VFR BLD CALC: 45.9 % (ref 37–54)
HDLC SERPL-MCNC: 42 MG/DL
HEMOGLOBIN: 13.8 G/DL (ref 12.5–16.5)
IMMATURE GRANULOCYTES #: 0.01 E9/L
IMMATURE GRANULOCYTES %: 0.1 % (ref 0–5)
KETONES, URINE: NEGATIVE MG/DL
LDL CHOLESTEROL CALCULATED: 101 MG/DL (ref 0–99)
LEUKOCYTE ESTERASE, URINE: ABNORMAL
LYMPHOCYTES ABSOLUTE: 2.12 E9/L (ref 1.5–4)
LYMPHOCYTES RELATIVE PERCENT: 25.1 % (ref 20–42)
MCH RBC QN AUTO: 27 PG (ref 26–35)
MCHC RBC AUTO-ENTMCNC: 30.1 % (ref 32–34.5)
MCV RBC AUTO: 89.8 FL (ref 80–99.9)
MONOCYTES ABSOLUTE: 0.94 E9/L (ref 0.1–0.95)
MONOCYTES RELATIVE PERCENT: 11.1 % (ref 2–12)
NEUTROPHILS ABSOLUTE: 3.96 E9/L (ref 1.8–7.3)
NEUTROPHILS RELATIVE PERCENT: 46.9 % (ref 43–80)
NITRITE, URINE: NEGATIVE
PDW BLD-RTO: 15 FL (ref 11.5–15)
PH UA: 7 (ref 5–9)
PLATELET # BLD: 61 E9/L (ref 130–450)
PLATELET CONFIRMATION: NORMAL
PMV BLD AUTO: 14.4 FL (ref 7–12)
POTASSIUM SERPL-SCNC: 5.3 MMOL/L (ref 3.5–5)
PROTEIN UA: ABNORMAL MG/DL
RBC # BLD: 5.11 E12/L (ref 3.8–5.8)
RBC UA: ABNORMAL /HPF (ref 0–2)
SODIUM BLD-SCNC: 143 MMOL/L (ref 132–146)
SPECIFIC GRAVITY UA: 1.01 (ref 1–1.03)
TOTAL PROTEIN: 8.2 G/DL (ref 6.4–8.3)
TRIGL SERPL-MCNC: 106 MG/DL (ref 0–149)
UROBILINOGEN, URINE: 0.2 E.U./DL
VLDLC SERPL CALC-MCNC: 21 MG/DL
WBC # BLD: 8.5 E9/L (ref 4.5–11.5)
WBC UA: >20 /HPF (ref 0–5)

## 2019-11-11 PROCEDURE — 36415 COLL VENOUS BLD VENIPUNCTURE: CPT

## 2019-11-11 PROCEDURE — 81001 URINALYSIS AUTO W/SCOPE: CPT

## 2019-11-11 PROCEDURE — 87088 URINE BACTERIA CULTURE: CPT

## 2019-11-11 PROCEDURE — 87186 SC STD MICRODIL/AGAR DIL: CPT

## 2019-11-11 PROCEDURE — G0009 ADMIN PNEUMOCOCCAL VACCINE: HCPCS | Performed by: FAMILY MEDICINE

## 2019-11-11 PROCEDURE — 83036 HEMOGLOBIN GLYCOSYLATED A1C: CPT

## 2019-11-11 PROCEDURE — 80061 LIPID PANEL: CPT

## 2019-11-11 PROCEDURE — 90732 PPSV23 VACC 2 YRS+ SUBQ/IM: CPT | Performed by: FAMILY MEDICINE

## 2019-11-11 PROCEDURE — 82962 GLUCOSE BLOOD TEST: CPT | Performed by: FAMILY MEDICINE

## 2019-11-11 PROCEDURE — 85025 COMPLETE CBC W/AUTO DIFF WBC: CPT

## 2019-11-11 PROCEDURE — 99213 OFFICE O/P EST LOW 20 MIN: CPT | Performed by: FAMILY MEDICINE

## 2019-11-11 PROCEDURE — 87077 CULTURE AEROBIC IDENTIFY: CPT

## 2019-11-11 PROCEDURE — 80053 COMPREHEN METABOLIC PANEL: CPT

## 2019-11-11 ASSESSMENT — ENCOUNTER SYMPTOMS
RESPIRATORY NEGATIVE: 1
GASTROINTESTINAL NEGATIVE: 1
EYES NEGATIVE: 1
ALLERGIC/IMMUNOLOGIC NEGATIVE: 1

## 2019-11-13 VITALS
WEIGHT: 162 LBS | DIASTOLIC BLOOD PRESSURE: 78 MMHG | SYSTOLIC BLOOD PRESSURE: 127 MMHG | HEIGHT: 64 IN | BODY MASS INDEX: 27.66 KG/M2

## 2019-11-13 ASSESSMENT — PATIENT HEALTH QUESTIONNAIRE - PHQ9
SUM OF ALL RESPONSES TO PHQ QUESTIONS 1-9: 0
2. FEELING DOWN, DEPRESSED OR HOPELESS: 0
SUM OF ALL RESPONSES TO PHQ QUESTIONS 1-9: 0
SUM OF ALL RESPONSES TO PHQ9 QUESTIONS 1 & 2: 0
1. LITTLE INTEREST OR PLEASURE IN DOING THINGS: 0

## 2019-11-15 LAB
ORGANISM: ABNORMAL
URINE CULTURE, ROUTINE: ABNORMAL

## 2019-11-18 RX ORDER — CIPROFLOXACIN 250 MG/1
250 TABLET, FILM COATED ORAL 2 TIMES DAILY
Qty: 14 TABLET | Refills: 0 | Status: SHIPPED | OUTPATIENT
Start: 2019-11-18 | End: 2019-11-26

## 2019-11-26 ENCOUNTER — OFFICE VISIT (OUTPATIENT)
Dept: CARDIOLOGY CLINIC | Age: 84
End: 2019-11-26
Payer: MEDICARE

## 2019-11-26 VITALS
WEIGHT: 162 LBS | SYSTOLIC BLOOD PRESSURE: 130 MMHG | BODY MASS INDEX: 27.66 KG/M2 | RESPIRATION RATE: 16 BRPM | DIASTOLIC BLOOD PRESSURE: 70 MMHG | HEART RATE: 74 BPM | HEIGHT: 64 IN

## 2019-11-26 DIAGNOSIS — I25.10 CORONARY ARTERY DISEASE INVOLVING NATIVE CORONARY ARTERY OF NATIVE HEART WITHOUT ANGINA PECTORIS: Primary | ICD-10-CM

## 2019-11-26 DIAGNOSIS — I44.7 LBBB (LEFT BUNDLE BRANCH BLOCK): ICD-10-CM

## 2019-11-26 PROCEDURE — 93000 ELECTROCARDIOGRAM COMPLETE: CPT | Performed by: INTERNAL MEDICINE

## 2019-11-26 PROCEDURE — 99214 OFFICE O/P EST MOD 30 MIN: CPT | Performed by: INTERNAL MEDICINE

## 2019-11-26 RX ORDER — CARVEDILOL 6.25 MG/1
6.25 TABLET ORAL 2 TIMES DAILY
Qty: 60 TABLET | Refills: 3 | Status: SHIPPED | OUTPATIENT
Start: 2019-11-26 | End: 2019-12-04 | Stop reason: SDUPTHER

## 2019-12-04 ENCOUNTER — OFFICE VISIT (OUTPATIENT)
Dept: PRIMARY CARE CLINIC | Age: 84
End: 2019-12-04
Payer: MEDICARE

## 2019-12-04 VITALS
WEIGHT: 162 LBS | DIASTOLIC BLOOD PRESSURE: 68 MMHG | SYSTOLIC BLOOD PRESSURE: 114 MMHG | OXYGEN SATURATION: 98 % | HEART RATE: 65 BPM | HEIGHT: 64 IN | RESPIRATION RATE: 16 BRPM | BODY MASS INDEX: 27.66 KG/M2

## 2019-12-04 DIAGNOSIS — Z00.00 ENCOUNTER FOR GENERAL MEDICAL EXAMINATION: Primary | ICD-10-CM

## 2019-12-04 DIAGNOSIS — Z00.00 ROUTINE GENERAL MEDICAL EXAMINATION AT A HEALTH CARE FACILITY: ICD-10-CM

## 2019-12-04 PROCEDURE — G0438 PPPS, INITIAL VISIT: HCPCS | Performed by: PHYSICIAN ASSISTANT

## 2019-12-04 ASSESSMENT — PATIENT HEALTH QUESTIONNAIRE - PHQ9
SUM OF ALL RESPONSES TO PHQ QUESTIONS 1-9: 1
SUM OF ALL RESPONSES TO PHQ QUESTIONS 1-9: 1

## 2019-12-04 ASSESSMENT — LIFESTYLE VARIABLES: HOW OFTEN DO YOU HAVE A DRINK CONTAINING ALCOHOL: 0

## 2019-12-05 RX ORDER — CARVEDILOL 6.25 MG/1
6.25 TABLET ORAL 2 TIMES DAILY
Qty: 60 TABLET | Refills: 5 | Status: SHIPPED
Start: 2019-12-05 | End: 2020-06-01 | Stop reason: SDUPTHER

## 2020-01-14 ENCOUNTER — OFFICE VISIT (OUTPATIENT)
Dept: FAMILY MEDICINE CLINIC | Age: 85
End: 2020-01-14
Payer: MEDICARE

## 2020-01-14 VITALS
TEMPERATURE: 97.8 F | OXYGEN SATURATION: 97 % | HEIGHT: 64 IN | HEART RATE: 71 BPM | WEIGHT: 160 LBS | RESPIRATION RATE: 20 BRPM | BODY MASS INDEX: 27.31 KG/M2 | DIASTOLIC BLOOD PRESSURE: 76 MMHG | SYSTOLIC BLOOD PRESSURE: 140 MMHG

## 2020-01-14 PROCEDURE — 99214 OFFICE O/P EST MOD 30 MIN: CPT | Performed by: PHYSICIAN ASSISTANT

## 2020-01-14 RX ORDER — BENZONATATE 100 MG/1
100 CAPSULE ORAL 3 TIMES DAILY PRN
Qty: 21 CAPSULE | Refills: 0 | Status: SHIPPED | OUTPATIENT
Start: 2020-01-14 | End: 2020-01-21

## 2020-01-14 RX ORDER — DOXYCYCLINE HYCLATE 100 MG
100 TABLET ORAL 2 TIMES DAILY
Qty: 20 TABLET | Refills: 0 | Status: SHIPPED | OUTPATIENT
Start: 2020-01-14 | End: 2020-01-24
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
## 2020-02-03 ENCOUNTER — HOSPITAL ENCOUNTER (OUTPATIENT)
Age: 85
Discharge: HOME | End: 2020-02-05
Payer: MEDICARE

## 2020-02-03 ENCOUNTER — TELEPHONE (OUTPATIENT)
Dept: PRIMARY CARE CLINIC | Age: 85
End: 2020-02-03

## 2020-02-03 LAB
ALBUMIN SERPL-MCNC: 4.1 G/DL (ref 3.5–5.2)
ALP BLD-CCNC: 65 U/L (ref 40–129)
ALT SERPL-CCNC: 23 U/L (ref 0–40)
ANION GAP SERPL CALCULATED.3IONS-SCNC: 12 MMOL/L (ref 7–16)
AST SERPL-CCNC: 17 U/L (ref 0–39)
BASOPHILS ABSOLUTE: 0.07 E9/L (ref 0–0.2)
BASOPHILS RELATIVE PERCENT: 0.9 % (ref 0–2)
BILIRUB SERPL-MCNC: 0.3 MG/DL (ref 0–1.2)
BILIRUBIN URINE: NEGATIVE
BLOOD, URINE: NEGATIVE
BUN BLDV-MCNC: 16 MG/DL (ref 8–23)
CALCIUM SERPL-MCNC: 9.7 MG/DL (ref 8.6–10.2)
CHLORIDE BLD-SCNC: 102 MMOL/L (ref 98–107)
CHOLESTEROL, TOTAL: 154 MG/DL (ref 0–199)
CLARITY: CLEAR
CO2: 26 MMOL/L (ref 22–29)
COLOR: YELLOW
CREAT SERPL-MCNC: 0.9 MG/DL (ref 0.7–1.2)
EOSINOPHILS ABSOLUTE: 0.82 E9/L (ref 0.05–0.5)
EOSINOPHILS RELATIVE PERCENT: 10.1 % (ref 0–6)
GFR AFRICAN AMERICAN: >60
GFR NON-AFRICAN AMERICAN: >60 ML/MIN/1.73
GLUCOSE BLD-MCNC: 134 MG/DL (ref 74–99)
GLUCOSE URINE: NEGATIVE MG/DL
HBA1C MFR BLD: 9.2 % (ref 4–5.6)
HCT VFR BLD CALC: 46.3 % (ref 37–54)
HDLC SERPL-MCNC: 40 MG/DL
HEMOGLOBIN: 14 G/DL (ref 12.5–16.5)
IMMATURE GRANULOCYTES #: 0.02 E9/L
IMMATURE GRANULOCYTES %: 0.2 % (ref 0–5)
KETONES, URINE: NEGATIVE MG/DL
LDL CHOLESTEROL CALCULATED: 98 MG/DL (ref 0–99)
LEUKOCYTE ESTERASE, URINE: NEGATIVE
LYMPHOCYTES ABSOLUTE: 2.16 E9/L (ref 1.5–4)
LYMPHOCYTES RELATIVE PERCENT: 26.6 % (ref 20–42)
MCH RBC QN AUTO: 27.1 PG (ref 26–35)
MCHC RBC AUTO-ENTMCNC: 30.2 % (ref 32–34.5)
MCV RBC AUTO: 89.6 FL (ref 80–99.9)
MICROALBUMIN UR-MCNC: 115.2 MG/L
MONOCYTES ABSOLUTE: 0.98 E9/L (ref 0.1–0.95)
MONOCYTES RELATIVE PERCENT: 12.1 % (ref 2–12)
NEUTROPHILS ABSOLUTE: 4.08 E9/L (ref 1.8–7.3)
NEUTROPHILS RELATIVE PERCENT: 50.1 % (ref 43–80)
NITRITE, URINE: NEGATIVE
PDW BLD-RTO: 14.3 FL (ref 11.5–15)
PH UA: 6.5 (ref 5–9)
PLATELET # BLD: 72 E9/L (ref 130–450)
PLATELET CONFIRMATION: NORMAL
PMV BLD AUTO: 13.5 FL (ref 7–12)
POTASSIUM SERPL-SCNC: 5.2 MMOL/L (ref 3.5–5)
PROTEIN UA: NEGATIVE MG/DL
RBC # BLD: 5.17 E12/L (ref 3.8–5.8)
SODIUM BLD-SCNC: 140 MMOL/L (ref 132–146)
SPECIFIC GRAVITY UA: 1.02 (ref 1–1.03)
TOTAL PROTEIN: 8.3 G/DL (ref 6.4–8.3)
TRIGL SERPL-MCNC: 82 MG/DL (ref 0–149)
UROBILINOGEN, URINE: 0.2 E.U./DL
VLDLC SERPL CALC-MCNC: 16 MG/DL
WBC # BLD: 8.1 E9/L (ref 4.5–11.5)

## 2020-02-03 PROCEDURE — 36415 COLL VENOUS BLD VENIPUNCTURE: CPT

## 2020-02-03 PROCEDURE — 82044 UR ALBUMIN SEMIQUANTITATIVE: CPT

## 2020-02-03 PROCEDURE — 81003 URINALYSIS AUTO W/O SCOPE: CPT

## 2020-02-03 PROCEDURE — 83036 HEMOGLOBIN GLYCOSYLATED A1C: CPT

## 2020-02-03 PROCEDURE — 80061 LIPID PANEL: CPT

## 2020-02-03 PROCEDURE — 80053 COMPREHEN METABOLIC PANEL: CPT

## 2020-02-03 PROCEDURE — 85025 COMPLETE CBC W/AUTO DIFF WBC: CPT

## 2020-02-10 ENCOUNTER — OFFICE VISIT (OUTPATIENT)
Dept: PRIMARY CARE CLINIC | Age: 85
End: 2020-02-10
Payer: MEDICARE

## 2020-02-10 VITALS
HEIGHT: 64 IN | TEMPERATURE: 97.7 F | DIASTOLIC BLOOD PRESSURE: 82 MMHG | WEIGHT: 160 LBS | SYSTOLIC BLOOD PRESSURE: 128 MMHG | BODY MASS INDEX: 27.31 KG/M2

## 2020-02-10 PROBLEM — I25.10 CORONARY ARTERY DISEASE INVOLVING NATIVE CORONARY ARTERY OF NATIVE HEART WITHOUT ANGINA PECTORIS: Chronic | Status: ACTIVE | Noted: 2019-09-30

## 2020-02-10 PROCEDURE — 99214 OFFICE O/P EST MOD 30 MIN: CPT | Performed by: FAMILY MEDICINE

## 2020-02-10 RX ORDER — GLUCOSAMINE HCL/CHONDROITIN SU 500-400 MG
CAPSULE ORAL
Qty: 100 STRIP | Refills: 12 | Status: SHIPPED
Start: 2020-02-10 | End: 2021-03-03 | Stop reason: SDUPTHER

## 2020-02-10 ASSESSMENT — ENCOUNTER SYMPTOMS
GASTROINTESTINAL NEGATIVE: 1
ALLERGIC/IMMUNOLOGIC NEGATIVE: 1
RESPIRATORY NEGATIVE: 1
EYES NEGATIVE: 1
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
## 2020-03-02 RX ORDER — PRAVASTATIN SODIUM 20 MG
20 TABLET ORAL DAILY
Qty: 90 TABLET | Refills: 3 | Status: SHIPPED
Start: 2020-03-02 | End: 2021-02-10 | Stop reason: SDUPTHER

## 2020-04-20 ENCOUNTER — APPOINTMENT (OUTPATIENT)
Dept: GENERAL RADIOLOGY | Age: 85
End: 2020-04-20
Payer: MEDICARE

## 2020-04-20 ENCOUNTER — HOSPITAL ENCOUNTER (EMERGENCY)
Age: 85
Discharge: HOME | End: 2020-04-20
Attending: EMERGENCY MEDICINE
Payer: MEDICARE

## 2020-04-20 VITALS
BODY MASS INDEX: 27.46 KG/M2 | RESPIRATION RATE: 16 BRPM | HEART RATE: 64 BPM | WEIGHT: 160 LBS | DIASTOLIC BLOOD PRESSURE: 68 MMHG | OXYGEN SATURATION: 99 % | TEMPERATURE: 96.9 F | SYSTOLIC BLOOD PRESSURE: 168 MMHG

## 2020-04-20 LAB
ALBUMIN SERPL-MCNC: 4.3 G/DL (ref 3.5–5.2)
ALP BLD-CCNC: 75 U/L (ref 40–129)
ALT SERPL-CCNC: 18 U/L (ref 0–40)
ANION GAP SERPL CALCULATED.3IONS-SCNC: 13 MMOL/L (ref 7–16)
AST SERPL-CCNC: 16 U/L (ref 0–39)
BILIRUB SERPL-MCNC: 0.7 MG/DL (ref 0–1.2)
BUN BLDV-MCNC: 15 MG/DL (ref 8–23)
CALCIUM SERPL-MCNC: 9.8 MG/DL (ref 8.6–10.2)
CHLORIDE BLD-SCNC: 99 MMOL/L (ref 98–107)
CO2: 26 MMOL/L (ref 22–29)
CREAT SERPL-MCNC: 0.7 MG/DL (ref 0.7–1.2)
GFR AFRICAN AMERICAN: >60
GFR NON-AFRICAN AMERICAN: >60 ML/MIN/1.73
GLUCOSE BLD-MCNC: 229 MG/DL (ref 74–99)
HCT VFR BLD CALC: 45.1 % (ref 37–54)
HEMOGLOBIN: 14.3 G/DL (ref 12.5–16.5)
MCH RBC QN AUTO: 27.3 PG (ref 26–35)
MCHC RBC AUTO-ENTMCNC: 31.7 % (ref 32–34.5)
MCV RBC AUTO: 86.1 FL (ref 80–99.9)
PDW BLD-RTO: 14.4 FL (ref 11.5–15)
PLATELET # BLD: 201 E9/L (ref 130–450)
PMV BLD AUTO: 13.7 FL (ref 7–12)
POTASSIUM SERPL-SCNC: 4.1 MMOL/L (ref 3.5–5)
PRO-BNP: 424 PG/ML (ref 0–450)
RBC # BLD: 5.24 E12/L (ref 3.8–5.8)
SODIUM BLD-SCNC: 138 MMOL/L (ref 132–146)
TOTAL PROTEIN: 7.8 G/DL (ref 6.4–8.3)
TROPONIN: <0.01 NG/ML (ref 0–0.03)
WBC # BLD: 11.1 E9/L (ref 4.5–11.5)

## 2020-04-20 PROCEDURE — 80053 COMPREHEN METABOLIC PANEL: CPT

## 2020-04-20 PROCEDURE — 85027 COMPLETE CBC AUTOMATED: CPT

## 2020-04-20 PROCEDURE — 93005 ELECTROCARDIOGRAM TRACING: CPT | Performed by: NURSE PRACTITIONER

## 2020-04-20 PROCEDURE — 83880 ASSAY OF NATRIURETIC PEPTIDE: CPT

## 2020-04-20 PROCEDURE — 84484 ASSAY OF TROPONIN QUANT: CPT

## 2020-04-20 PROCEDURE — 71046 X-RAY EXAM CHEST 2 VIEWS: CPT

## 2020-04-20 PROCEDURE — 99285 EMERGENCY DEPT VISIT HI MDM: CPT

## 2020-04-20 PROCEDURE — 36415 COLL VENOUS BLD VENIPUNCTURE: CPT

## 2020-04-21 ENCOUNTER — TELEPHONE (OUTPATIENT)
Dept: PRIMARY CARE CLINIC | Age: 85
End: 2020-04-21

## 2020-04-21 LAB
EKG ATRIAL RATE: 74 BPM
EKG P AXIS: 67 DEGREES
EKG P-R INTERVAL: 158 MS
EKG Q-T INTERVAL: 438 MS
EKG QRS DURATION: 130 MS
EKG QTC CALCULATION (BAZETT): 486 MS
EKG R AXIS: 42 DEGREES
EKG T AXIS: 78 DEGREES
EKG VENTRICULAR RATE: 74 BPM

## 2020-04-21 PROCEDURE — 93010 ELECTROCARDIOGRAM REPORT: CPT | Performed by: INTERNAL MEDICINE

## 2020-04-22 ASSESSMENT — ENCOUNTER SYMPTOMS
DIARRHEA: 0
EYE PAIN: 0
SHORTNESS OF BREATH: 0
EYE DISCHARGE: 0
COUGH: 0
SORE THROAT: 0
ABDOMINAL DISTENTION: 0
BACK PAIN: 0
ABDOMINAL PAIN: 0
EYE REDNESS: 0
WHEEZING: 0
NAUSEA: 1
VOMITING: 1
SINUS PRESSURE: 0

## 2020-04-22 NOTE — ED PROVIDER NOTES
HPI  This is an 81 yo M with a PMHx significant for HTN, HLD, CAD, DMT2, GERD and OA who presents after experiencing chest pain, nausea and vomiting yesterday. The patient states he did not come in yesterday because he hoped it would pass. He states that it did pass and that he is currently symptom free. He states that he came in today because \"my family made me even though I feel better. \" Yesterday he states the pain was left sided and felt like pressure. He denies radiation of the pain. He cannot think of anything that made the pain better or worse, \"it was there and then it wasn't. \" The patient states that he thinks his symptoms may be related to something he ate. The patient denies recent trauma, fever, chills, fatigue, HA, dizziness, vision changes, congestion, rhinorrhea, sore throat, neck pain, palpitations, SOB, cough, wheezing, abdominal pain, N/V/D/C, hematochezia, melena, dysuria, hematuria, generalized weakness and paresthesias. The patient is currently taking no blood thinners. The patient is not a smoker and he denies all alcohol and illicit drug use. .    The history is provided by the patient. Review of Systems   Constitutional: Negative for chills, diaphoresis, fatigue and fever. HENT: Negative for congestion, ear pain, nosebleeds, postnasal drip, sinus pressure and sore throat. Eyes: Negative for pain, discharge and redness. Respiratory: Negative for cough, shortness of breath and wheezing. Cardiovascular: Positive for chest pain. Negative for palpitations and leg swelling. Gastrointestinal: Positive for nausea and vomiting. Negative for abdominal distention, abdominal pain and diarrhea. Genitourinary: Negative for difficulty urinating, dysuria, flank pain, frequency and hematuria. Musculoskeletal: Negative for arthralgias, back pain, myalgias and neck pain. Skin: Negative for rash and wound.    Neurological: Negative for dizziness, weakness, light-headedness, numbness mg/dL    BUN 15 8 - 23 mg/dL    CREATININE 0.7 0.7 - 1.2 mg/dL    GFR Non-African American >60 >=60 mL/min/1.73    GFR African American >60     Calcium 9.8 8.6 - 10.2 mg/dL    Total Protein 7.8 6.4 - 8.3 g/dL    Alb 4.3 3.5 - 5.2 g/dL    Total Bilirubin 0.7 0.0 - 1.2 mg/dL    Alkaline Phosphatase 75 40 - 129 U/L    ALT 18 0 - 40 U/L    AST 16 0 - 39 U/L   Troponin   Result Value Ref Range    Troponin <0.01 0.00 - 0.03 ng/mL   Brain Natriuretic Peptide   Result Value Ref Range    Pro- 0 - 450 pg/mL   EKG 12 Lead   Result Value Ref Range    Ventricular Rate 74 BPM    Atrial Rate 74 BPM    P-R Interval 158 ms    QRS Duration 130 ms    Q-T Interval 438 ms    QTc Calculation (Bazett) 486 ms    P Axis 67 degrees    R Axis 42 degrees    T Axis 78 degrees       EKG: As interpreted by this ER physician  Rate: 74 bpm  Rhythm: Sinus with left bundle branch block  Axis: normal  ST Segments: no acute change  T-Waves: no acute change  Interpretation: Abnormal EKG  Comparison: stable as compared to patient's most recent EKG on 11/26/2019       Radiology:  XR CHEST STANDARD (2 VW)   Final Result   No acute cardiopulmonary abnormality.          ------------------------- NURSING NOTES AND VITALS REVIEWED ---------------------------  Date / Time Roomed:  4/20/2020  3:47 PM  ED Bed Assignment:  11/11    The nursing notes within the ED encounter and vital signs as below have been reviewed. BP (!) 168/68   Pulse 64   Temp 96.9 °F (36.1 °C)   Resp 16   Wt 160 lb (72.6 kg)   SpO2 99%   BMI 27.46 kg/m²   Oxygen Saturation Interpretation: Normal    --------------------------------- PROGRESS NOTES / ADDITIONAL PROVIDER NOTES ---------------------------------  Consultations:  As outlined below. ED Course:    ED Course as of Apr 22 0454   Mon Apr 20, 2020   2707 I went discussed the findings of her work-up with the patient. He continues to state that he feels completely normal and that he wants to go home.   I informed him

## 2020-04-29 ENCOUNTER — TELEPHONE (OUTPATIENT)
Dept: PRIMARY CARE CLINIC | Age: 85
End: 2020-04-29

## 2020-04-29 NOTE — TELEPHONE ENCOUNTER
Left message to return call.   Need to push appointment back to mid-late June due to COVID-19 per Dr. Alvena Landau

## 2020-06-01 ENCOUNTER — TELEPHONE (OUTPATIENT)
Dept: ADMINISTRATIVE | Age: 85
End: 2020-06-01

## 2020-06-02 RX ORDER — CARVEDILOL 6.25 MG/1
6.25 TABLET ORAL 2 TIMES DAILY
Qty: 180 TABLET | Refills: 3 | Status: SHIPPED
Start: 2020-06-02 | End: 2021-02-10 | Stop reason: SDUPTHER

## 2020-06-19 ENCOUNTER — OFFICE VISIT (OUTPATIENT)
Dept: PRIMARY CARE CLINIC | Age: 85
End: 2020-06-19
Payer: MEDICARE

## 2020-06-19 ENCOUNTER — HOSPITAL ENCOUNTER (OUTPATIENT)
Age: 85
Discharge: HOME | End: 2020-06-21
Payer: MEDICARE

## 2020-06-19 LAB
ALBUMIN SERPL-MCNC: 4.4 G/DL (ref 3.5–5.2)
ALP BLD-CCNC: 76 U/L (ref 40–129)
ALT SERPL-CCNC: 21 U/L (ref 0–40)
ANION GAP SERPL CALCULATED.3IONS-SCNC: 16 MMOL/L (ref 7–16)
AST SERPL-CCNC: 22 U/L (ref 0–39)
BACTERIA: ABNORMAL /HPF
BASOPHILS ABSOLUTE: 0.07 E9/L (ref 0–0.2)
BASOPHILS RELATIVE PERCENT: 0.6 % (ref 0–2)
BILIRUB SERPL-MCNC: 0.5 MG/DL (ref 0–1.2)
BILIRUBIN URINE: NEGATIVE
BLOOD, URINE: ABNORMAL
BUN BLDV-MCNC: 15 MG/DL (ref 8–23)
BURR CELLS: ABNORMAL
CALCIUM SERPL-MCNC: 9.8 MG/DL (ref 8.6–10.2)
CHLORIDE BLD-SCNC: 101 MMOL/L (ref 98–107)
CHOLESTEROL, TOTAL: 147 MG/DL (ref 0–199)
CLARITY: CLEAR
CO2: 24 MMOL/L (ref 22–29)
COLOR: YELLOW
CREAT SERPL-MCNC: 0.9 MG/DL (ref 0.7–1.2)
EOSINOPHILS ABSOLUTE: 2.02 E9/L (ref 0.05–0.5)
EOSINOPHILS RELATIVE PERCENT: 18.3 % (ref 0–6)
GFR AFRICAN AMERICAN: >60
GFR NON-AFRICAN AMERICAN: >60 ML/MIN/1.73
GLUCOSE BLD-MCNC: 75 MG/DL (ref 74–99)
GLUCOSE URINE: 100 MG/DL
HBA1C MFR BLD: 8.8 % (ref 4–5.6)
HCT VFR BLD CALC: 47.4 % (ref 37–54)
HDLC SERPL-MCNC: 43 MG/DL
HEMOGLOBIN: 15 G/DL (ref 12.5–16.5)
IMMATURE GRANULOCYTES #: 0.03 E9/L
IMMATURE GRANULOCYTES %: 0.3 % (ref 0–5)
KETONES, URINE: NEGATIVE MG/DL
LDL CHOLESTEROL CALCULATED: 87 MG/DL (ref 0–99)
LEUKOCYTE ESTERASE, URINE: NEGATIVE
LYMPHOCYTES ABSOLUTE: 2.09 E9/L (ref 1.5–4)
LYMPHOCYTES RELATIVE PERCENT: 18.9 % (ref 20–42)
MCH RBC QN AUTO: 27.8 PG (ref 26–35)
MCHC RBC AUTO-ENTMCNC: 31.6 % (ref 32–34.5)
MCV RBC AUTO: 87.8 FL (ref 80–99.9)
MICROALBUMIN UR-MCNC: 288.4 MG/L
MONOCYTES ABSOLUTE: 1.25 E9/L (ref 0.1–0.95)
MONOCYTES RELATIVE PERCENT: 11.3 % (ref 2–12)
NEUTROPHILS ABSOLUTE: 5.58 E9/L (ref 1.8–7.3)
NEUTROPHILS RELATIVE PERCENT: 50.6 % (ref 43–80)
NITRITE, URINE: POSITIVE
PDW BLD-RTO: 14.6 FL (ref 11.5–15)
PH UA: 7 (ref 5–9)
PLATELET # BLD: 184 E9/L (ref 130–450)
PMV BLD AUTO: 13.6 FL (ref 7–12)
POIKILOCYTES: ABNORMAL
POTASSIUM SERPL-SCNC: 4.4 MMOL/L (ref 3.5–5)
PROTEIN UA: 30 MG/DL
RBC # BLD: 5.4 E12/L (ref 3.8–5.8)
RBC UA: ABNORMAL /HPF (ref 0–2)
SODIUM BLD-SCNC: 141 MMOL/L (ref 132–146)
SPECIFIC GRAVITY UA: 1.02 (ref 1–1.03)
TOTAL PROTEIN: 8.6 G/DL (ref 6.4–8.3)
TRIGL SERPL-MCNC: 85 MG/DL (ref 0–149)
UROBILINOGEN, URINE: 0.2 E.U./DL
VLDLC SERPL CALC-MCNC: 17 MG/DL
WBC # BLD: 11 E9/L (ref 4.5–11.5)
WBC UA: ABNORMAL /HPF (ref 0–5)

## 2020-06-19 PROCEDURE — 83036 HEMOGLOBIN GLYCOSYLATED A1C: CPT

## 2020-06-19 PROCEDURE — 3052F HG A1C>EQUAL 8.0%<EQUAL 9.0%: CPT | Performed by: FAMILY MEDICINE

## 2020-06-19 PROCEDURE — 80053 COMPREHEN METABOLIC PANEL: CPT

## 2020-06-19 PROCEDURE — 80061 LIPID PANEL: CPT

## 2020-06-19 PROCEDURE — 82044 UR ALBUMIN SEMIQUANTITATIVE: CPT

## 2020-06-19 PROCEDURE — 85025 COMPLETE CBC W/AUTO DIFF WBC: CPT

## 2020-06-19 PROCEDURE — 81001 URINALYSIS AUTO W/SCOPE: CPT

## 2020-06-19 PROCEDURE — 36415 COLL VENOUS BLD VENIPUNCTURE: CPT

## 2020-06-19 PROCEDURE — 99214 OFFICE O/P EST MOD 30 MIN: CPT | Performed by: FAMILY MEDICINE

## 2020-06-19 RX ORDER — INSULIN DETEMIR 100 [IU]/ML
60 INJECTION, SOLUTION SUBCUTANEOUS NIGHTLY
Qty: 18 VIAL | Refills: 5 | Status: SHIPPED
Start: 2020-06-19 | End: 2021-02-10 | Stop reason: SDUPTHER

## 2020-06-19 RX ORDER — HYDROXYZINE PAMOATE 25 MG/1
25 CAPSULE ORAL NIGHTLY PRN
Qty: 90 CAPSULE | Refills: 3 | Status: SHIPPED
Start: 2020-06-19 | End: 2021-02-10
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
## 2020-06-21 VITALS
SYSTOLIC BLOOD PRESSURE: 132 MMHG | WEIGHT: 162 LBS | TEMPERATURE: 98.1 F | DIASTOLIC BLOOD PRESSURE: 82 MMHG | BODY MASS INDEX: 27.81 KG/M2

## 2020-06-21 PROBLEM — K59.00 CONSTIPATION: Status: ACTIVE | Noted: 2020-06-21

## 2020-06-21 PROBLEM — R94.31 EKG ABNORMALITIES: Chronic | Status: ACTIVE | Noted: 2019-08-30

## 2020-06-21 PROBLEM — K59.00 CONSTIPATION: Chronic | Status: ACTIVE | Noted: 2020-06-21

## 2020-06-21 ASSESSMENT — PATIENT HEALTH QUESTIONNAIRE - PHQ9
SUM OF ALL RESPONSES TO PHQ9 QUESTIONS 1 & 2: 0
2. FEELING DOWN, DEPRESSED OR HOPELESS: 0
SUM OF ALL RESPONSES TO PHQ QUESTIONS 1-9: 0
1. LITTLE INTEREST OR PLEASURE IN DOING THINGS: 0
SUM OF ALL RESPONSES TO PHQ QUESTIONS 1-9: 0

## 2020-06-21 ASSESSMENT — ENCOUNTER SYMPTOMS
ALLERGIC/IMMUNOLOGIC NEGATIVE: 1
GASTROINTESTINAL NEGATIVE: 1
EYES NEGATIVE: 1
RESPIRATORY NEGATIVE: 1

## 2020-06-22 ENCOUNTER — TELEPHONE (OUTPATIENT)
Dept: PRIMARY CARE CLINIC | Age: 85
End: 2020-06-22

## 2020-09-18 ENCOUNTER — OFFICE VISIT (OUTPATIENT)
Dept: PRIMARY CARE CLINIC | Age: 85
End: 2020-09-18
Payer: MEDICARE

## 2020-09-18 VITALS
WEIGHT: 163 LBS | DIASTOLIC BLOOD PRESSURE: 78 MMHG | SYSTOLIC BLOOD PRESSURE: 128 MMHG | TEMPERATURE: 97.8 F | BODY MASS INDEX: 27.98 KG/M2

## 2020-09-18 PROCEDURE — 99213 OFFICE O/P EST LOW 20 MIN: CPT | Performed by: FAMILY MEDICINE

## 2020-09-18 PROCEDURE — 3052F HG A1C>EQUAL 8.0%<EQUAL 9.0%: CPT | Performed by: FAMILY MEDICINE

## 2020-09-18 ASSESSMENT — PATIENT HEALTH QUESTIONNAIRE - PHQ9
1. LITTLE INTEREST OR PLEASURE IN DOING THINGS: 0
SUM OF ALL RESPONSES TO PHQ9 QUESTIONS 1 & 2: 0
SUM OF ALL RESPONSES TO PHQ QUESTIONS 1-9: 0
SUM OF ALL RESPONSES TO PHQ QUESTIONS 1-9: 0
2. FEELING DOWN, DEPRESSED OR HOPELESS: 0

## 2020-09-18 NOTE — PROGRESS NOTES
20  Name: Gisell Rihcardson    : 1932    Sex: male    Age: 80 y.o. Subjective:  Chief Complaint: Patient is here for left foot swelling     For two months  No pain  No  Calf pain     \sees  cardiop next month      Review of Systems   Constitutional: Negative. HENT: Negative. Eyes: Negative. Respiratory: Negative. Cardiovascular: Positive for leg swelling (left foot swelling mild   ). Gastrointestinal: Negative. Endocrine: Negative. Genitourinary: Negative. Musculoskeletal: Negative. Skin: Negative. Allergic/Immunologic: Negative. Neurological: Negative. Hematological: Negative. Psychiatric/Behavioral: Negative. Current Outpatient Medications:     insulin aspart (NOVOLOG) 100 UNIT/ML injection vial, Inject 10 Units into the skin 3 times daily (before meals) Replaces   70/30    Discontinue   70/30, Disp: 12 vial, Rfl: 5    hydrOXYzine (VISTARIL) 25 MG capsule, Take 1 capsule by mouth nightly as needed (sleep), Disp: 90 capsule, Rfl: 3    insulin detemir (LEVEMIR) 100 UNIT/ML injection vial, Inject 60 Units into the skin nightly, Disp: 18 vial, Rfl: 5    linaclotide (LINZESS) 145 MCG capsule, Take 1 capsule by mouth daily as needed (constipation), Disp: 90 capsule, Rfl: 5    carvedilol (COREG) 6.25 MG tablet, Take 1 tablet by mouth 2 times daily, Disp: 180 tablet, Rfl: 3    pravastatin (PRAVACHOL) 20 MG tablet, Take 1 tablet by mouth daily, Disp: 90 tablet, Rfl: 3    insulin detemir (LEVEMIR FLEXTOUCH) 100 UNIT/ML injection pen, Inject 66 Units into the skin nightly, Disp: 12 pen, Rfl: 12    blood glucose monitor strips, Test 1 times a day & as needed for symptoms of irregular blood glucose.  Contour next    Test strips, Disp: 100 strip, Rfl: 12    Insulin Syringes, Disposable, U-100 0.3 ML MISC, 1 each by Does not apply route 3 times daily, Disp: 300 each, Rfl: 12    latanoprost (XALATAN) 0.005 % ophthalmic solution, Place 1 drop into the left eye nightly , Disp: , Rfl:     blood glucose test strips (ASCENSIA AUTODISC VI;ONE TOUCH ULTRA TEST VI) strip, 1 each by In Vitro route daily As needed. , Disp: , Rfl:     aspirin 81 MG tablet, Take 81 mg by mouth daily Patient stopped taking 1 year ago, Disp: , Rfl:     Insulin Pen Needle (B-D UF III MINI PEN NEEDLES) 31G X 5 MM MISC, 1 each by Does not apply route daily, Disp: , Rfl:     losartan (COZAAR) 100 MG tablet, Take 1 tablet by mouth daily, Disp: 90 tablet, Rfl: 5    mirtazapine (REMERON) 7.5 MG tablet, Take 1 tablet by mouth nightly, Disp: 90 tablet, Rfl: 1  No Known Allergies  Social History     Socioeconomic History    Marital status:      Spouse name: Not on file    Number of children: Not on file    Years of education: Not on file    Highest education level: Not on file   Occupational History    Not on file   Social Needs    Financial resource strain: Not on file    Food insecurity     Worry: Not on file     Inability: Not on file    Transportation needs     Medical: Not on file     Non-medical: Not on file   Tobacco Use    Smoking status: Never Smoker    Smokeless tobacco: Never Used   Substance and Sexual Activity    Alcohol use: No    Drug use: No    Sexual activity: Not on file   Lifestyle    Physical activity     Days per week: Not on file     Minutes per session: Not on file    Stress: Not on file   Relationships    Social connections     Talks on phone: Not on file     Gets together: Not on file     Attends Yazidi service: Not on file     Active member of club or organization: Not on file     Attends meetings of clubs or organizations: Not on file     Relationship status: Not on file    Intimate partner violence     Fear of current or ex partner: Not on file     Emotionally abused: Not on file     Physically abused: Not on file     Forced sexual activity: Not on file   Other Topics Concern    Not on file   Social History Narrative     for 10 years.     3 sounds. Abdominal:      Palpations: Abdomen is soft. Musculoskeletal: Normal range of motion. Skin:     General: Skin is warm. Neurological:      Mental Status: He is alert and oriented to person, place, and time. Psychiatric:         Behavior: Behavior normal.         Sorin Baez was seen today for joint swelling. Diagnoses and all orders for this visit:    Essential hypertension    Uncontrolled type 1 diabetes mellitus with hyperglycemia ([address]    Localized swelling of left lower leg  -     Humaira Fontaine DPM, Podiatry, [facility]        Comments: appt podiarty     Call if any pain      Has no  Calf pain or swelling      Denies  Trauma  A great deal of time spent reviewing medications, diet, exercise, social issues. Also reviewing the chart before entering the room with patient and finishing charting after leaving patient's room. More than half of that time was spent face to face with the patient in counseling and coordinating care. Follow Up: Return for See Referral, Reg Appt.      Seen by:  Mariana Cali DO

## 2020-10-02 ENCOUNTER — PROCEDURE VISIT (OUTPATIENT)
Dept: PODIATRY | Age: 85
End: 2020-10-02
Payer: MEDICARE

## 2020-10-02 VITALS — BODY MASS INDEX: 27.83 KG/M2 | HEIGHT: 64 IN | RESPIRATION RATE: 14 BRPM | WEIGHT: 163 LBS

## 2020-10-02 PROCEDURE — 11056 PARNG/CUTG B9 HYPRKR LES 2-4: CPT | Performed by: PODIATRIST

## 2020-10-02 PROCEDURE — 11721 DEBRIDE NAIL 6 OR MORE: CPT | Performed by: PODIATRIST

## 2020-10-02 PROCEDURE — 3052F HG A1C>EQUAL 8.0%<EQUAL 9.0%: CPT | Performed by: PODIATRIST

## 2020-10-02 PROCEDURE — 99203 OFFICE O/P NEW LOW 30 MIN: CPT | Performed by: PODIATRIST

## 2020-10-02 NOTE — PROGRESS NOTES
Christian Hale is here today for a diabetic foot exam and nail care. his PCP is Steffany Davey DO last OV was 20.        10/2/20  Christian Hale : 1932 Sex: male  Age: 80 y.o. Patient was referred by Katharina Hubbard DO    CC:   Diabetic foot exam and painful elongated toenails 1-5 right and left    HPI:   This pleasant 66-year-old male patient referred to me today for diabetic foot exam and painful elongated toenails 1-5 right and left. Previously did see another podiatrist but he did retire. Does have elongated toenails today. No history of open wounds or any diabetic ulcerations. Does have diabetic shoes with diabetic inserts. No new injuries. No additional pedal complaints at this time. ROS:  Const: Denies constitutional symptoms  Musculo: Denies symptoms other than stated above  Skin: Denies symptoms other than stated above      Current Outpatient Medications:     insulin aspart (NOVOLOG) 100 UNIT/ML injection vial, Inject 10 Units into the skin 3 times daily (before meals) Replaces   70/30    Discontinue   70/30, Disp: 12 vial, Rfl: 5    hydrOXYzine (VISTARIL) 25 MG capsule, Take 1 capsule by mouth nightly as needed (sleep), Disp: 90 capsule, Rfl: 3    insulin detemir (LEVEMIR) 100 UNIT/ML injection vial, Inject 60 Units into the skin nightly, Disp: 18 vial, Rfl: 5    linaclotide (LINZESS) 145 MCG capsule, Take 1 capsule by mouth daily as needed (constipation), Disp: 90 capsule, Rfl: 5    carvedilol (COREG) 6.25 MG tablet, Take 1 tablet by mouth 2 times daily, Disp: 180 tablet, Rfl: 3    pravastatin (PRAVACHOL) 20 MG tablet, Take 1 tablet by mouth daily, Disp: 90 tablet, Rfl: 3    insulin detemir (LEVEMIR FLEXTOUCH) 100 UNIT/ML injection pen, Inject 66 Units into the skin nightly, Disp: 12 pen, Rfl: 12    blood glucose monitor strips, Test 1 times a day & as needed for symptoms of irregular blood glucose.  Contour next    Test strips, Disp: 100 strip, Rfl: 12    Insulin Syringes, Disposable, U-100 0.3 ML MISC, 1 each by Does not apply route 3 times daily, Disp: 300 each, Rfl: 12    latanoprost (XALATAN) 0.005 % ophthalmic solution, Place 1 drop into the left eye nightly , Disp: , Rfl:     blood glucose test strips (ASCENSIA AUTODISC VI;ONE TOUCH ULTRA TEST VI) strip, 1 each by In Vitro route daily As needed. , Disp: , Rfl:     aspirin 81 MG tablet, Take 81 mg by mouth daily Patient stopped taking 1 year ago, Disp: , Rfl:     Insulin Pen Needle (B-D UF III MINI PEN NEEDLES) 31G X 5 MM MISC, 1 each by Does not apply route daily, Disp: , Rfl:     losartan (COZAAR) 100 MG tablet, Take 1 tablet by mouth daily, Disp: 90 tablet, Rfl: 5    mirtazapine (REMERON) 7.5 MG tablet, Take 1 tablet by mouth nightly, Disp: 90 tablet, Rfl: 1  No Known Allergies    Past Medical History:   Diagnosis Date    CAD (coronary artery disease) 09/30/2019    Chronic pain syndrome     Constipation     Degenerative joint disease involving multiple joints     Diabetes ([address]     Diabetic neuropathy (HCC)     Glaucoma     Left eye only    Hyperlipidemia     Hypertension     Insomnia     Multiple vessel coronary artery disease     Osteoarthritis     wrist    Paresthesia of right leg     Peptic reflux disease     Raised prostate specific antigen     Type 2 diabetes mellitus without complication ([address]     polyneuropathy       Vitals:    10/02/20 1551   Resp: 14          Work History/Social History:     Focused Lower Extremity Physical Exam:      Neurovascular examination:    Dorsalis Pedis palpable bilateral.  Posterior tibialis non-palpable bilateral.    Capillary Refill Time:  Immediate return  Hair growth:  Symmetrical and bilateral   Skin:  Not atrophic  Edema: Mild edema bilateral feet. Mild edema bilateral ankles.   Neurologic:  Light touch diminished bilateral.  Warm to coolness bilateral distal toes  Decreased epicritic sensation     Musculoskeletal/ Orthopedic examination:    Equinis: present bilateral  Dorsiflexion, plantarflexion, inversion, eversion bilateral 5 out of 5 muscle strength  Wiggling toes  Negative Homans    Dermatology examination:    Toenails 1 through 5 bilateral thickened, elongated, dystrophic, mycotic with subungual debris. Web spaces 1 through 4 bilateral clean dry and intact. Hyperkeratotic tissue noted plantar fifth metatarsal head bilateral.              Assessment and Plan:  Cookie Lares was seen today for nail problem and diabetes. Diagnoses and all orders for this visit:    Tinea unguium    Corns and callosities    Hereditary sensory neuropathy    Uncontrolled type 1 diabetes mellitus with hyperglycemia ([facility] [address]      Patient seen new referral today diabetic foot and ankle exam.    Diabetic foot and ankle examination performed today  Formal debridement toenails 1 through 5 right and left with manual debridement for thickness and overall length. Paring hyperkeratotic tissue with a #15 blade plantar fifth metatarsal head bilateral.    Avoid barefoot. Follow-up 2 months. Return in about 2 months (around 12/2/2020). Seen By:  Christine Wayne DPM      Document was created using voice recognition software. Note was reviewed however may contain grammatical errors.

## 2020-10-02 NOTE — LETTER
Tahira Lai DO   [address] [place] [address]     Patient: Zhanna Can  YOB: 1932  Date of Visit: 10/7/2020     Dear Tahira Lai DO    Thank you for referring Zhanna Can to me for evaluation. Formal diabetic foot ankle exam performed today. Did perform debridement all toenails and calluses. Patient tolerated procedure well. We will follow-up 2 months monitor overall progression. Once again, thank you very much for this kind referral and allowing me to participate in the care of this patient. If you have questions, please do not hesitate to call me.     Sincerely,        KLAUDIA Card [place] [address] [place], [facility] [address] [id number]  Phone: [phone]  Fax: [phone]

## 2020-10-09 ENCOUNTER — HOSPITAL ENCOUNTER (OUTPATIENT)
Age: 85
Discharge: HOME | End: 2020-10-11
Payer: MEDICARE

## 2020-10-09 LAB
ALBUMIN SERPL-MCNC: 4.4 G/DL (ref 3.5–5.2)
ALP BLD-CCNC: 69 U/L (ref 40–129)
ALT SERPL-CCNC: 22 U/L (ref 0–40)
ANION GAP SERPL CALCULATED.3IONS-SCNC: 15 MMOL/L (ref 7–16)
AST SERPL-CCNC: 19 U/L (ref 0–39)
BACTERIA: ABNORMAL /HPF
BASOPHILS ABSOLUTE: 0.06 E9/L (ref 0–0.2)
BASOPHILS RELATIVE PERCENT: 0.6 % (ref 0–2)
BILIRUB SERPL-MCNC: 0.6 MG/DL (ref 0–1.2)
BILIRUBIN URINE: NEGATIVE
BLOOD, URINE: NEGATIVE
BUN BLDV-MCNC: 15 MG/DL (ref 8–23)
CALCIUM SERPL-MCNC: 10.1 MG/DL (ref 8.6–10.2)
CHLORIDE BLD-SCNC: 101 MMOL/L (ref 98–107)
CHOLESTEROL, TOTAL: 170 MG/DL (ref 0–199)
CLARITY: CLEAR
CO2: 26 MMOL/L (ref 22–29)
COLOR: YELLOW
CREAT SERPL-MCNC: 1 MG/DL (ref 0.7–1.2)
EOSINOPHILS ABSOLUTE: 2.53 E9/L (ref 0.05–0.5)
EOSINOPHILS RELATIVE PERCENT: 25.7 % (ref 0–6)
GFR AFRICAN AMERICAN: >60
GFR NON-AFRICAN AMERICAN: >60 ML/MIN/1.73
GLUCOSE BLD-MCNC: 117 MG/DL (ref 74–99)
GLUCOSE URINE: NEGATIVE MG/DL
HBA1C MFR BLD: 9.7 % (ref 4–5.6)
HCT VFR BLD CALC: 47.3 % (ref 37–54)
HDLC SERPL-MCNC: 45 MG/DL
HEMOGLOBIN: 14.5 G/DL (ref 12.5–16.5)
IMMATURE GRANULOCYTES #: 0.01 E9/L
IMMATURE GRANULOCYTES %: 0.1 % (ref 0–5)
KETONES, URINE: NEGATIVE MG/DL
LDL CHOLESTEROL CALCULATED: 103 MG/DL (ref 0–99)
LEUKOCYTE ESTERASE, URINE: ABNORMAL
LYMPHOCYTES ABSOLUTE: 2.07 E9/L (ref 1.5–4)
LYMPHOCYTES RELATIVE PERCENT: 21 % (ref 20–42)
MCH RBC QN AUTO: 27.2 PG (ref 26–35)
MCHC RBC AUTO-ENTMCNC: 30.7 % (ref 32–34.5)
MCV RBC AUTO: 88.6 FL (ref 80–99.9)
MICROALBUMIN UR-MCNC: 168.2 MG/L
MONOCYTES ABSOLUTE: 1.12 E9/L (ref 0.1–0.95)
MONOCYTES RELATIVE PERCENT: 11.4 % (ref 2–12)
NEUTROPHILS ABSOLUTE: 4.05 E9/L (ref 1.8–7.3)
NEUTROPHILS RELATIVE PERCENT: 41.2 % (ref 43–80)
NITRITE, URINE: POSITIVE
PDW BLD-RTO: 14.6 FL (ref 11.5–15)
PH UA: 6.5 (ref 5–9)
PLATELET # BLD: 152 E9/L (ref 130–450)
PMV BLD AUTO: ABNORMAL FL (ref 7–12)
POTASSIUM SERPL-SCNC: 5 MMOL/L (ref 3.5–5)
PROSTATE SPECIFIC ANTIGEN: 1.17 NG/ML (ref 0–4)
PROTEIN UA: ABNORMAL MG/DL
RBC # BLD: 5.34 E12/L (ref 3.8–5.8)
RBC UA: ABNORMAL /HPF (ref 0–2)
SODIUM BLD-SCNC: 142 MMOL/L (ref 132–146)
SPECIFIC GRAVITY UA: 1.02 (ref 1–1.03)
TOTAL PROTEIN: 8 G/DL (ref 6.4–8.3)
TRIGL SERPL-MCNC: 112 MG/DL (ref 0–149)
UROBILINOGEN, URINE: 0.2 E.U./DL
VLDLC SERPL CALC-MCNC: 22 MG/DL
WBC # BLD: 9.8 E9/L (ref 4.5–11.5)
WBC UA: ABNORMAL /HPF (ref 0–5)

## 2020-10-09 PROCEDURE — 80053 COMPREHEN METABOLIC PANEL: CPT

## 2020-10-09 PROCEDURE — G0103 PSA SCREENING: HCPCS

## 2020-10-09 PROCEDURE — 81001 URINALYSIS AUTO W/SCOPE: CPT

## 2020-10-09 PROCEDURE — 82044 UR ALBUMIN SEMIQUANTITATIVE: CPT

## 2020-10-09 PROCEDURE — 80061 LIPID PANEL: CPT

## 2020-10-09 PROCEDURE — 83036 HEMOGLOBIN GLYCOSYLATED A1C: CPT

## 2020-10-09 PROCEDURE — 85025 COMPLETE CBC W/AUTO DIFF WBC: CPT

## 2020-10-09 PROCEDURE — 36415 COLL VENOUS BLD VENIPUNCTURE: CPT

## 2020-10-16 ENCOUNTER — OFFICE VISIT (OUTPATIENT)
Dept: PRIMARY CARE CLINIC | Age: 85
End: 2020-10-16
Payer: MEDICARE

## 2020-10-16 VITALS
BODY MASS INDEX: 28 KG/M2 | HEIGHT: 64 IN | TEMPERATURE: 97.8 F | SYSTOLIC BLOOD PRESSURE: 132 MMHG | WEIGHT: 164 LBS | DIASTOLIC BLOOD PRESSURE: 82 MMHG

## 2020-10-16 LAB
CHP ED QC CHECK: NORMAL
GLUCOSE BLD-MCNC: 281 MG/DL

## 2020-10-16 PROCEDURE — 90694 VACC AIIV4 NO PRSRV 0.5ML IM: CPT | Performed by: FAMILY MEDICINE

## 2020-10-16 PROCEDURE — 82962 GLUCOSE BLOOD TEST: CPT | Performed by: FAMILY MEDICINE

## 2020-10-16 PROCEDURE — G0008 ADMIN INFLUENZA VIRUS VAC: HCPCS | Performed by: FAMILY MEDICINE

## 2020-10-16 PROCEDURE — 99214 OFFICE O/P EST MOD 30 MIN: CPT | Performed by: FAMILY MEDICINE

## 2020-10-16 RX ORDER — LOSARTAN POTASSIUM 100 MG/1
100 TABLET ORAL DAILY
Qty: 90 TABLET | Refills: 5 | Status: SHIPPED
Start: 2020-10-16 | End: 2022-01-03 | Stop reason: SDUPTHER

## 2020-10-16 ASSESSMENT — ENCOUNTER SYMPTOMS
EYES NEGATIVE: 1
GASTROINTESTINAL NEGATIVE: 1
ALLERGIC/IMMUNOLOGIC NEGATIVE: 1
RESPIRATORY NEGATIVE: 1

## 2020-10-16 NOTE — PROGRESS NOTES
10/16/20  Name: Nisha Arnie    : 1932    Sex: male    Age: 80 y.o. Subjective:  Chief Complaint: Patient is here for   4 mock  Re    diabets   Ca d  Chol bp      Feels great no cp or sob    Active        Lab ntoed   ghb  Poor       Sees  Cardio Monday      he  Ck bs  At home   And   Good in am an d   Sl hgiher in  Afternoon        Review of Systems   Constitutional: Negative. HENT: Negative. Eyes: Negative. Respiratory: Negative. Cardiovascular: Negative. Gastrointestinal: Negative. Endocrine: Negative. Genitourinary: Negative. Musculoskeletal: Negative. Skin: Negative. Allergic/Immunologic: Negative. Neurological: Negative. Hematological: Negative. Psychiatric/Behavioral: Negative. Current Outpatient Medications:     losartan (COZAAR) 100 MG tablet, Take 1 tablet by mouth daily, Disp: 90 tablet, Rfl: 5    insulin aspart (NOVOLOG) 100 UNIT/ML injection vial, Inject 10 Units into the skin 3 times daily (before meals) Replaces   70/30    Discontinue   70/30, Disp: 12 vial, Rfl: 5    hydrOXYzine (VISTARIL) 25 MG capsule, Take 1 capsule by mouth nightly as needed (sleep), Disp: 90 capsule, Rfl: 3    insulin detemir (LEVEMIR) 100 UNIT/ML injection vial, Inject 60 Units into the skin nightly, Disp: 18 vial, Rfl: 5    linaclotide (LINZESS) 145 MCG capsule, Take 1 capsule by mouth daily as needed (constipation), Disp: 90 capsule, Rfl: 5    carvedilol (COREG) 6.25 MG tablet, Take 1 tablet by mouth 2 times daily, Disp: 180 tablet, Rfl: 3    pravastatin (PRAVACHOL) 20 MG tablet, Take 1 tablet by mouth daily, Disp: 90 tablet, Rfl: 3    insulin detemir (LEVEMIR FLEXTOUCH) 100 UNIT/ML injection pen, Inject 66 Units into the skin nightly, Disp: 12 pen, Rfl: 12    blood glucose monitor strips, Test 1 times a day & as needed for symptoms of irregular blood glucose.  Contour next    Test strips, Disp: 100 strip, Rfl: 12    Insulin Syringes, Disposable, U-100 0.3 ML MISC, 1 each by Does not apply route 3 times daily, Disp: 300 each, Rfl: 12    latanoprost (XALATAN) 0.005 % ophthalmic solution, Place 1 drop into the left eye nightly , Disp: , Rfl:     blood glucose test strips (ASCENSIA AUTODISC VI;ONE TOUCH ULTRA TEST VI) strip, 1 each by In Vitro route daily As needed. , Disp: , Rfl:     aspirin 81 MG tablet, Take 81 mg by mouth daily Patient stopped taking 1 year ago, Disp: , Rfl:     Insulin Pen Needle (B-D UF III MINI PEN NEEDLES) 31G X 5 MM MISC, 1 each by Does not apply route daily, Disp: , Rfl:   No Known Allergies  Social History     Socioeconomic History    Marital status:      Spouse name: Not on file    Number of children: Not on file    Years of education: Not on file    Highest education level: Not on file   Occupational History    Not on file   Social Needs    Financial resource strain: Not on file    Food insecurity     Worry: Not on file     Inability: Not on file    Transportation needs     Medical: Not on file     Non-medical: Not on file   Tobacco Use    Smoking status: Never Smoker    Smokeless tobacco: Never Used   Substance and Sexual Activity    Alcohol use: No    Drug use: No    Sexual activity: Not on file   Lifestyle    Physical activity     Days per week: Not on file     Minutes per session: Not on file    Stress: Not on file   Relationships    Social connections     Talks on phone: Not on file     Gets together: Not on file     Attends Mosque service: Not on file     Active member of club or organization: Not on file     Attends meetings of clubs or organizations: Not on file     Relationship status: Not on file    Intimate partner violence     Fear of current or ex partner: Not on file     Emotionally abused: Not on file     Physically abused: Not on file     Forced sexual activity: Not on file   Other Topics Concern    Not on file   Social History Narrative     for 10 years.     3 children with his first

## 2020-10-19 ENCOUNTER — OFFICE VISIT (OUTPATIENT)
Dept: CARDIOLOGY CLINIC | Age: 85
End: 2020-10-19
Payer: MEDICARE

## 2020-10-19 VITALS
RESPIRATION RATE: 16 BRPM | HEIGHT: 64 IN | WEIGHT: 162.8 LBS | HEART RATE: 56 BPM | BODY MASS INDEX: 27.79 KG/M2 | DIASTOLIC BLOOD PRESSURE: 72 MMHG | SYSTOLIC BLOOD PRESSURE: 110 MMHG

## 2020-10-19 PROCEDURE — 93000 ELECTROCARDIOGRAM COMPLETE: CPT | Performed by: INTERNAL MEDICINE

## 2020-10-19 PROCEDURE — 99214 OFFICE O/P EST MOD 30 MIN: CPT | Performed by: INTERNAL MEDICINE

## 2020-10-19 NOTE — PROGRESS NOTES
 Cancer Mother     Heart Attack Mother     Other Father         Pneumonia    Kidney Disease Brother     Cancer Sister        Social History:  Social History     Tobacco Use    Smoking status: Never Smoker    Smokeless tobacco: Never Used   Substance Use Topics    Alcohol use: No    Drug use: No        Allergies:  No Known Allergies    Current Medications:    Current Outpatient Medications:     losartan (COZAAR) 100 MG tablet, Take 1 tablet by mouth daily, Disp: 90 tablet, Rfl: 5    insulin aspart (NOVOLOG) 100 UNIT/ML injection vial, Inject 10 Units into the skin 3 times daily (before meals) Replaces   70/30    Discontinue   70/30, Disp: 12 vial, Rfl: 5    hydrOXYzine (VISTARIL) 25 MG capsule, Take 1 capsule by mouth nightly as needed (sleep), Disp: 90 capsule, Rfl: 3    insulin detemir (LEVEMIR) 100 UNIT/ML injection vial, Inject 60 Units into the skin nightly, Disp: 18 vial, Rfl: 5    carvedilol (COREG) 6.25 MG tablet, Take 1 tablet by mouth 2 times daily, Disp: 180 tablet, Rfl: 3    pravastatin (PRAVACHOL) 20 MG tablet, Take 1 tablet by mouth daily, Disp: 90 tablet, Rfl: 3    insulin detemir (LEVEMIR FLEXTOUCH) 100 UNIT/ML injection pen, Inject 66 Units into the skin nightly, Disp: 12 pen, Rfl: 12    blood glucose monitor strips, Test 1 times a day & as needed for symptoms of irregular blood glucose. Contour next    Test strips, Disp: 100 strip, Rfl: 12    Insulin Syringes, Disposable, U-100 0.3 ML MISC, 1 each by Does not apply route 3 times daily, Disp: 300 each, Rfl: 12    latanoprost (XALATAN) 0.005 % ophthalmic solution, Place 1 drop into the left eye nightly , Disp: , Rfl:     blood glucose test strips (ASCENSIA AUTODISC VI;ONE TOUCH ULTRA TEST VI) strip, 1 each by In Vitro route daily As needed. , Disp: , Rfl:     aspirin 81 MG tablet, Take 81 mg by mouth daily Patient stopped taking 1 year ago, Disp: , Rfl:     Insulin Pen Needle (B-D UF III MINI PEN NEEDLES) 31G X 5 MM MISC, 1 each by Does not apply route daily, Disp: , Rfl:     linaclotide (LINZESS) 145 MCG capsule, Take 1 capsule by mouth daily as needed (constipation) (Patient not taking: Reported on 10/19/2020), Disp: 90 capsule, Rfl: 5    Physical Exam:  /72   Pulse 56   Resp 16   Ht 5' 4\" (1.626 m)   Wt 162 lb 12.8 oz (73.8 kg)   BMI 27.94 kg/m²   Wt Readings from Last 3 Encounters:   10/19/20 162 lb 12.8 oz (73.8 kg)   10/16/20 164 lb (74.4 kg)   10/02/20 163 lb (73.9 kg)     Appearance: Awake, alert and oriented x 3, no acute respiratory distress  Skin: Intact, no rash  Head: Normocephalic, atraumatic  Eyes: EOMI, no conjunctival erythema  ENMT: No pharyngeal erythema, MMM, no rhinorrhea  Neck: Supple, no elevated JVP, no carotid bruits  Lungs: Clear to auscultation bilaterally. No wheezes, rales, or rhonchi.   Cardiac: Regular rate and rhythm, +S1S2, no murmurs apparent  Abdomen: Soft, nontender, +bowel sounds  Extremities: Moves all extremities x 4, trace lower extremity edema  Neurologic: No focal motor deficits apparent, normal mood and affect, alert and oriented x 3  Peripheral Pulses: Intact posterior tibial pulses bilaterally    Laboratory Tests:  Lab Results   Component Value Date    CREATININE 1.0 10/09/2020    BUN 15 10/09/2020     10/09/2020    K 5.0 10/09/2020     10/09/2020    CO2 26 10/09/2020     No results found for: MG  Lab Results   Component Value Date    WBC 9.8 10/09/2020    HGB 14.5 10/09/2020    HCT 47.3 10/09/2020    MCV 88.6 10/09/2020     10/09/2020     Lab Results   Component Value Date    ALT 22 10/09/2020    AST 19 10/09/2020    ALKPHOS 69 10/09/2020    BILITOT 0.6 10/09/2020     Lab Results   Component Value Date    TROPONINI <0.01 04/20/2020    TROPONINI <0.01 10/04/2019     No results found for: INR, PROTIME  Lab Results   Component Value Date    TSH 3.500 08/22/2019     Lab Results   Component Value Date    LABA1C 9.7 (H) 10/09/2020     No results found for: EAG  Lab

## 2020-10-30 ENCOUNTER — TELEPHONE (OUTPATIENT)
Dept: CARDIOLOGY | Age: 85
End: 2020-10-30

## 2020-10-30 NOTE — TELEPHONE ENCOUNTER
Spoke with patient to confirm Lexiscan stress test. Patient requested earlier appointment time same day due to transportation. Appointment changed to [address] [place] [address] on Nov. 3, 2020. Instructions for test and COVID-19 preprocedure checklist reviewed with patient.

## 2020-11-03 ENCOUNTER — HOSPITAL ENCOUNTER (OUTPATIENT)
Dept: CARDIOLOGY | Age: 85
Discharge: HOME | End: 2020-11-03
Payer: MEDICARE

## 2020-11-03 VITALS
WEIGHT: 162 LBS | TEMPERATURE: 97.5 F | BODY MASS INDEX: 27.66 KG/M2 | DIASTOLIC BLOOD PRESSURE: 74 MMHG | HEART RATE: 56 BPM | SYSTOLIC BLOOD PRESSURE: 130 MMHG | RESPIRATION RATE: 12 BRPM | HEIGHT: 64 IN

## 2020-11-03 PROCEDURE — 2580000003 HC RX 258: Performed by: INTERNAL MEDICINE

## 2020-11-03 PROCEDURE — 93017 CV STRESS TEST TRACING ONLY: CPT

## 2020-11-03 PROCEDURE — 6360000002 HC RX W HCPCS: Performed by: INTERNAL MEDICINE

## 2020-11-03 PROCEDURE — 3430000000 HC RX DIAGNOSTIC RADIOPHARMACEUTICAL: Performed by: INTERNAL MEDICINE

## 2020-11-03 PROCEDURE — 78452 HT MUSCLE IMAGE SPECT MULT: CPT

## 2020-11-03 PROCEDURE — A9500 TC99M SESTAMIBI: HCPCS | Performed by: INTERNAL MEDICINE

## 2020-11-03 RX ORDER — SODIUM CHLORIDE 0.9 % (FLUSH) 0.9 %
10 SYRINGE (ML) INJECTION PRN
Status: DISCONTINUED | OUTPATIENT
Start: 2020-11-03 | End: 2020-11-04 | Stop reason: HOSPADM

## 2020-11-03 RX ADMIN — Medication 9.8 MILLICURIE: at 07:20

## 2020-11-03 RX ADMIN — SODIUM CHLORIDE, PRESERVATIVE FREE 10 ML: 5 INJECTION INTRAVENOUS at 07:20

## 2020-11-03 RX ADMIN — SODIUM CHLORIDE, PRESERVATIVE FREE 10 ML: 5 INJECTION INTRAVENOUS at 08:54

## 2020-11-03 RX ADMIN — REGADENOSON 0.4 MG: 0.08 INJECTION, SOLUTION INTRAVENOUS at 08:53

## 2020-11-03 RX ADMIN — Medication 33.7 MILLICURIE: at 08:53

## 2020-11-03 RX ADMIN — SODIUM CHLORIDE, PRESERVATIVE FREE 10 ML: 5 INJECTION INTRAVENOUS at 08:53

## 2020-11-03 NOTE — PROCEDURES
[address] [facility] [address] [phone]                Pharmacologic Stress Nuclear Gated SPECT Study    Name: [facility] Account Number: [de-identified]    :  1932          Sex: male         Date of Study:  11/3/2020    Height: 5' 4\" (162.6 cm)         Weight: 162 lb (73.5 kg)     Ordering Provider: [id number]          PCP: Gordy Sinha DO      Cardiologist: Elliott Andrade MD             Interpreting Physician: Lynn Glez MD  _________________________________________________________________________________    Indication:   Evaluation of extent and severity of coronary artery disease    Clinical History:   Patient has prior history of coronary artery disease. Resting ECG:    AL int *** sec, QRS int *** sec, QT int *** sec; HR *** bpm  {Resting ECG Findings:30960}    Procedure:   Pharmacologic stress testing was performed with regadenoson 0.4 mg for 15 seconds. Additionally, low-level exercise was performed along with the infusion. The heart rate was *** at baseline and edmond to *** beats during the infusion. This corresponds to ***% of maximum predicted heart rate. The blood pressure at baseline was *** and blood pressure at the end of infusion was ***. Blood pressure response was {Blank  Single:27270::\"hypertensive\",\"hypotensive\",\"normal\"} during the stress procedure. The patient {Blank single:79310::\"experienced *** during the infusion\",\"tolerated the infusion well\"}. ECG during the infusion {Blank single:49324::\"developed ST segment changes consistent with ischemia\",\"did not change\"}. IMAGING: Myocardial perfusion imaging was performed at rest 30-35 minutes following the intravenous injection of *** mCi of (Tc-Sestamibi) followed by 10 ml of Normal Saline.   As per infusion protocol, the patient was injected intravenously with *** mCi of (Tc-Sestamibi) followed by 10 ml of Normal Saline. Gated post-stress tomographic imaging was performed 20-25 minutes after stress. FINDINGS: The overall quality of the study was {RATIN}. Left ventricular cavity size was noted to be {Blank single:46132::\"enlarged on rest study only\",\"enlarged on stress study only\",\"enlarged on both rest and stress studies\",\"normal\"}. Rotational analog analysis demonstrated {Nuclear Stress Test Findings:81515::\"no patient motion or abnormal extracardiac radioactivity\"}. The gated SPECT stress imaging in the short, vertical long, and horizontal long axis demonstrated normal homogeneous tracer distribution throughout the myocardium. IF TEST NORMAL-HIGHLIGHT AND DELETE FOLLOWING SECTIONS:    A {MILD, MOD, SEV:50460} defect was present in the {Nuclear Stress Test Defect Segments:69187} wall(s) that was  {Blank single:65674::\"small\",\"moderate\",\"large\"} sized by quantification. There also was a {MILD, MOD, SEV:11727} defect present in the {Nuclear Stress Test Defect Segments:23522} wall(s) that was  {Blank single:97414::\"small\",\"moderate\",\"large\"} sized by quantification:     The resting images {Blank single:59919::\"are normal\",\"reveal partial reversibility\",\"reveal complete reversibility\",\"show no change\"}. Gated SPECT left ventricular ejection fraction was calculated to be ***%, with {Myocardial Wall Motion:97270::\"normal myocardial thickening and wall motion\"}. Impression:    1. ECG during the infusion {Blank single:09237::\"developed ST segment changes consistent with ischemia\",\"did not change\"}. 2. The myocardial perfusion imaging was {Myocardial perfusion imagin}. IF TEST NORMAL-HIGHLIGHT AND DELETE FOLLOWING SECTIONS:  The abnormality was a {Myocardial imaging defects:77451}    3.  Overall left ventricular systolic function was {Blank single:08359::\"abnormal with regional wall motion abnormalities\",\"abnormal without regional wall motion abnormalities\",\"normal without regional wall motion abnormalities\"}. 4. {Blank single:47186::\"High\",\"Intermediate\",\"Low\"} risk {Blank single:19470::\"pre-operative\",\"post MI\",\"general\"} pharmacologic stress test.    Thank you for sending your patient to this [facility].      {Esignature:[id number]}

## 2020-11-03 NOTE — PROCEDURES
[address] [facility] [address] [phone]                Pharmacologic Stress Nuclear Gated SPECT Study    Name: [facility] Account Number: [de-identified]    :  1932          Sex: male         Date of Study:  11/3/2020    Height: 5' 4\" (162.6 cm)         Weight: 162 lb (73.5 kg)     Ordering Provider: Giovanny Jiang MD          PCP: Araseli Artis MD             Interpreting Physician: Aibgail Lange MD  _________________________________________________________________________________    Indication:   Evaluation of extent and severity of coronary artery disease    Clinical History:   Patient has prior history of coronary artery disease. Resting ECG:    Normal sinus rhythm, anteroseptal MI age undetermined, nonspecific interventricular conduction delay, abnormal EKG    Procedure:   Pharmacologic stress testing was performed with regadenoson 0.4 mg for 15 seconds. Additionally, low-level exercise was performed along with the infusion. The heart rate was 60 at baseline and edmond to 132 beats during the infusion. This corresponds to 83% of maximum predicted heart rate. The blood pressure at baseline was 130/74 and blood pressure at the end of infusion was 140/74. Blood pressure response was normal during the stress procedure. The patient tolerated the infusion well. ECG during the infusion did not change. IMAGING: Myocardial perfusion imaging was performed at rest 30-35 minutes following the intravenous injection of 9.8 mCi of (Tc-Sestamibi) followed by 10 ml of Normal Saline. As per infusion protocol, the patient was injected intravenously with 33.7 mCi of (Tc-Sestamibi) followed by 10 ml of Normal Saline. Gated post-stress tomographic imaging was performed 20-25 minutes after stress. FINDINGS: The overall quality of the study was excellent.      Left ventricular cavity size was noted to be normal.    Rotational analog analysis demonstrated no patient motion or abnormal extracardiac radioactivity. The gated SPECT stress imaging in the short, vertical long, and horizontal long axis demonstrated abnormal delayed tracer distribution in the myocardium. A moderate defect was present in the distal anterior and apical wall(s) that was medium sized by quantification. The resting images show no change. Gated SPECT left ventricular ejection fraction was calculated to be 46%, with normal myocardial thickening and wall motion and hypokinesis of the distal anterior and apical  wall. Impression:    1. ECG during the infusion did not change. 2. The myocardial perfusion imaging was abnormal.    The abnormality was a moderate sized fixed defect without reversibilty in the distal anterior and apical walls suggestive of a prior MI.   3. Overall left ventricular systolic function was abnormal with regional wall motion abnormalities. 4. Intermediate risk general pharmacologic stress test.    Thank you for sending your patient to this [facility].      Electronically signed by Glo Herrera MD on 11/3/20 at 3:47 PM EST

## 2020-11-13 ENCOUNTER — TELEPHONE (OUTPATIENT)
Dept: CARDIOLOGY CLINIC | Age: 85
End: 2020-11-13

## 2020-12-11 ENCOUNTER — PROCEDURE VISIT (OUTPATIENT)
Dept: PODIATRY | Age: 85
End: 2020-12-11
Payer: MEDICARE

## 2020-12-11 PROCEDURE — 11056 PARNG/CUTG B9 HYPRKR LES 2-4: CPT | Performed by: PODIATRIST

## 2020-12-11 PROCEDURE — 11721 DEBRIDE NAIL 6 OR MORE: CPT | Performed by: PODIATRIST

## 2020-12-11 NOTE — PROGRESS NOTES
Zhanna Can is here today for a diabetic foot exam and nail care. his PCP is DO Arabella last OV was  10/16/20. Zhanna Can : 1932 Sex: male  Age: 80 y.o. Patient was referred by DO Arabella    CC:   Diabetic foot exam and painful elongated toenails 1-5 right and left    HPI:   Follow-up diabetic foot exam and painful elongated toenails 1-5 right and left. Denies any foot or ankle pain. States since last visit he did notice a difference with no significant pain any toenails. Does have some callus tissue on bottom both feet. States his nails did not grow as much would like to try to see finger 3 months for next follow-up appointment. No calf pain no additional pedal complaints.     ROS:  Const: Denies constitutional symptoms  Musculo: Denies symptoms other than stated above  Skin: Denies symptoms other than stated above      Current Outpatient Medications:     dextrose 5 % SOLN, Infuse intravenously SHOT B a pill taken daily, Disp: , Rfl:     losartan (COZAAR) 100 MG tablet, Take 1 tablet by mouth daily, Disp: 90 tablet, Rfl: 5    insulin aspart (NOVOLOG) 100 UNIT/ML injection vial, Inject 10 Units into the skin 3 times daily (before meals) Replaces   70/30    Discontinue   70/30, Disp: 12 vial, Rfl: 5    hydrOXYzine (VISTARIL) 25 MG capsule, Take 1 capsule by mouth nightly as needed (sleep) (Patient taking differently: Take 25 mg by mouth nightly as needed (sleep) No longer taking), Disp: 90 capsule, Rfl: 3    insulin detemir (LEVEMIR) 100 UNIT/ML injection vial, Inject 60 Units into the skin nightly, Disp: 18 vial, Rfl: 5    linaclotide (LINZESS) 145 MCG capsule, Take 1 capsule by mouth daily as needed (constipation) (Patient not taking: Reported on 10/19/2020), Disp: 90 capsule, Rfl: 5    carvedilol (COREG) 6.25 MG tablet, Take 1 tablet by mouth 2 times daily, Disp: 180 tablet, Rfl: 3    pravastatin (PRAVACHOL) 20 MG tablet, Take 1 tablet by mouth daily, Disp: 90 tablet, Rfl: 3    insulin detemir (LEVEMIR FLEXTOUCH) 100 UNIT/ML injection pen, Inject 66 Units into the skin nightly, Disp: 12 pen, Rfl: 12    blood glucose monitor strips, Test 1 times a day & as needed for symptoms of irregular blood glucose. Contour next    Test strips, Disp: 100 strip, Rfl: 12    Insulin Syringes, Disposable, U-100 0.3 ML MISC, 1 each by Does not apply route 3 times daily, Disp: 300 each, Rfl: 12    latanoprost (XALATAN) 0.005 % ophthalmic solution, Place 1 drop into the left eye nightly , Disp: , Rfl:     blood glucose test strips (ASCENSIA AUTODISC VI;ONE TOUCH ULTRA TEST VI) strip, 1 each by In Vitro route daily As needed. , Disp: , Rfl:     aspirin 81 MG tablet, Take 81 mg by mouth daily Patient stopped taking 1 year ago, Disp: , Rfl:     Insulin Pen Needle (B-D UF III MINI PEN NEEDLES) 31G X 5 MM MISC, 1 each by Does not apply route daily, Disp: , Rfl:   No Known Allergies    Past Medical History:   Diagnosis Date    CAD (coronary artery disease) 09/30/2019    Chronic pain syndrome     Constipation     Degenerative joint disease involving multiple joints     Diabetes ([facility] [address]     Diabetic neuropathy (HCC)     Glaucoma     Left eye only    Hyperlipidemia     Hypertension     Insomnia     Multiple vessel coronary artery disease     Osteoarthritis     wrist    Paresthesia of right leg     Peptic reflux disease     Raised prostate specific antigen     Type 2 diabetes mellitus without complication (HCC)     polyneuropathy       There were no vitals filed for this visit. Work History/Social History:     Focused Lower Extremity Physical Exam:      Neurovascular examination:    Dorsalis Pedis palpable bilateral.  Posterior tibialis non-palpable bilateral.    Capillary Refill Time:  Immediate return  Hair growth:  Symmetrical and bilateral   Skin:  Not atrophic  Edema: Mild edema bilateral feet. Mild edema bilateral ankles.   Neurologic:  Light touch diminished bilateral. Warm to coolness bilateral distal toes  Decreased epicritic sensation     Musculoskeletal/ Orthopedic examination:    Equinis: present bilateral  Dorsiflexion, plantarflexion, inversion, eversion bilateral 5 out of 5 muscle strength  Wiggling toes  [facility]    Dermatology examination:    Toenails 1 through 5 bilateral thickened, elongated, dystrophic, mycotic with subungual debris. Web spaces 1 through 4 bilateral clean dry and intact. Hyperkeratotic tissue noted plantar fifth metatarsal head bilateral.  No open skin lesions or abrasions noted. Assessment and Plan:  There are no diagnoses linked to this encounter. Follow-up diabetic foot and ankle exam    Formal debridement toenails 1 through 5 right and left with manual debridement for thickness and overall length. Paring hyperkeratotic tissue with a #15 blade plantar fifth metatarsal head bilateral.    He would like to be seen in 3 months. I did tell him if any new foot or ankle issues come in sooner I be happy to see him anytime. Return in about 3 months (around 3/11/2021). Seen By:  Mynor Garcia DPM      Document was created using voice recognition software. Note was reviewed however may contain grammatical errors.

## 2021-02-04 DIAGNOSIS — I10 ESSENTIAL HYPERTENSION: ICD-10-CM

## 2021-02-04 DIAGNOSIS — E10.65 UNCONTROLLED TYPE 1 DIABETES MELLITUS WITH HYPERGLYCEMIA (HCC): ICD-10-CM

## 2021-02-04 DIAGNOSIS — E78.2 MIXED HYPERLIPIDEMIA: Chronic | ICD-10-CM

## 2021-02-04 DIAGNOSIS — I25.10 CORONARY ARTERY DISEASE INVOLVING NATIVE CORONARY ARTERY OF NATIVE HEART WITHOUT ANGINA PECTORIS: Chronic | ICD-10-CM

## 2021-02-04 LAB
ALBUMIN SERPL-MCNC: 4.1 G/DL (ref 3.5–5.2)
ALP BLD-CCNC: 72 U/L (ref 40–129)
ALT SERPL-CCNC: 18 U/L (ref 0–40)
AMORPHOUS: ABNORMAL
ANION GAP SERPL CALCULATED.3IONS-SCNC: 7 MMOL/L (ref 7–16)
AST SERPL-CCNC: 19 U/L (ref 0–39)
BACTERIA: ABNORMAL /HPF
BASOPHILS ABSOLUTE: 0.09 E9/L (ref 0–0.2)
BASOPHILS RELATIVE PERCENT: 0.9 % (ref 0–2)
BILIRUB SERPL-MCNC: 0.5 MG/DL (ref 0–1.2)
BILIRUBIN URINE: NEGATIVE
BLOOD, URINE: NEGATIVE
BUN BLDV-MCNC: 14 MG/DL (ref 8–23)
BURR CELLS: ABNORMAL
CALCIUM SERPL-MCNC: 9.3 MG/DL (ref 8.6–10.2)
CHLORIDE BLD-SCNC: 100 MMOL/L (ref 98–107)
CHOLESTEROL, TOTAL: 168 MG/DL (ref 0–199)
CLARITY: CLEAR
CO2: 32 MMOL/L (ref 22–29)
COLOR: YELLOW
CREAT SERPL-MCNC: 1 MG/DL (ref 0.7–1.2)
EOSINOPHILS ABSOLUTE: 2.22 E9/L (ref 0.05–0.5)
EOSINOPHILS RELATIVE PERCENT: 22.8 % (ref 0–6)
GFR AFRICAN AMERICAN: >60
GFR NON-AFRICAN AMERICAN: >60 ML/MIN/1.73
GLUCOSE BLD-MCNC: 181 MG/DL (ref 74–99)
GLUCOSE URINE: NEGATIVE MG/DL
HBA1C MFR BLD: 9.5 % (ref 4–5.6)
HCT VFR BLD CALC: 45.9 % (ref 37–54)
HDLC SERPL-MCNC: 43 MG/DL
HEMOGLOBIN: 14.5 G/DL (ref 12.5–16.5)
IMMATURE GRANULOCYTES #: 0.02 E9/L
IMMATURE GRANULOCYTES %: 0.2 % (ref 0–5)
KETONES, URINE: NEGATIVE MG/DL
LDL CHOLESTEROL CALCULATED: 106 MG/DL (ref 0–99)
LEUKOCYTE ESTERASE, URINE: NEGATIVE
LYMPHOCYTES ABSOLUTE: 1.89 E9/L (ref 1.5–4)
LYMPHOCYTES RELATIVE PERCENT: 19.4 % (ref 20–42)
MCH RBC QN AUTO: 27.8 PG (ref 26–35)
MCHC RBC AUTO-ENTMCNC: 31.6 % (ref 32–34.5)
MCV RBC AUTO: 88.1 FL (ref 80–99.9)
MICROALBUMIN UR-MCNC: 151 MG/L
MONOCYTES ABSOLUTE: 0.94 E9/L (ref 0.1–0.95)
MONOCYTES RELATIVE PERCENT: 9.6 % (ref 2–12)
NEUTROPHILS ABSOLUTE: 4.59 E9/L (ref 1.8–7.3)
NEUTROPHILS RELATIVE PERCENT: 47.1 % (ref 43–80)
NITRITE, URINE: POSITIVE
OVALOCYTES: ABNORMAL
PDW BLD-RTO: 14.6 FL (ref 11.5–15)
PH UA: 7 (ref 5–9)
PLATELET # BLD: 200 E9/L (ref 130–450)
PMV BLD AUTO: ABNORMAL FL (ref 7–12)
POIKILOCYTES: ABNORMAL
POTASSIUM SERPL-SCNC: 4.3 MMOL/L (ref 3.5–5)
PROTEIN UA: ABNORMAL MG/DL
RBC # BLD: 5.21 E12/L (ref 3.8–5.8)
RBC UA: ABNORMAL /HPF (ref 0–2)
SODIUM BLD-SCNC: 139 MMOL/L (ref 132–146)
SPECIFIC GRAVITY UA: 1.02 (ref 1–1.03)
TOTAL PROTEIN: 7.7 G/DL (ref 6.4–8.3)
TRIGL SERPL-MCNC: 95 MG/DL (ref 0–149)
UROBILINOGEN, URINE: 0.2 E.U./DL
VLDLC SERPL CALC-MCNC: 19 MG/DL
WBC # BLD: 9.8 E9/L (ref 4.5–11.5)
WBC UA: ABNORMAL /HPF (ref 0–5)

## 2021-02-10 ENCOUNTER — TELEPHONE (OUTPATIENT)
Dept: PRIMARY CARE CLINIC | Age: 86
End: 2021-02-10

## 2021-02-10 ENCOUNTER — OFFICE VISIT (OUTPATIENT)
Dept: PRIMARY CARE CLINIC | Age: 86
End: 2021-02-10
Payer: MEDICARE

## 2021-02-10 VITALS
SYSTOLIC BLOOD PRESSURE: 132 MMHG | DIASTOLIC BLOOD PRESSURE: 78 MMHG | HEIGHT: 64 IN | BODY MASS INDEX: 28 KG/M2 | TEMPERATURE: 97.9 F | WEIGHT: 164 LBS

## 2021-02-10 DIAGNOSIS — I10 ESSENTIAL HYPERTENSION: Primary | Chronic | ICD-10-CM

## 2021-02-10 DIAGNOSIS — E10.65 UNCONTROLLED TYPE 1 DIABETES MELLITUS WITH HYPERGLYCEMIA (HCC): ICD-10-CM

## 2021-02-10 DIAGNOSIS — I25.10 CORONARY ARTERY DISEASE INVOLVING NATIVE CORONARY ARTERY OF NATIVE HEART WITHOUT ANGINA PECTORIS: Chronic | ICD-10-CM

## 2021-02-10 DIAGNOSIS — E78.2 MIXED HYPERLIPIDEMIA: ICD-10-CM

## 2021-02-10 PROCEDURE — 99214 OFFICE O/P EST MOD 30 MIN: CPT | Performed by: FAMILY MEDICINE

## 2021-02-10 RX ORDER — INSULIN DETEMIR 100 [IU]/ML
60 INJECTION, SOLUTION SUBCUTANEOUS NIGHTLY
Qty: 18 VIAL | Refills: 5 | Status: SHIPPED
Start: 2021-02-10 | End: 2022-04-26

## 2021-02-10 RX ORDER — INSULIN DETEMIR 100 [IU]/ML
66 INJECTION, SOLUTION SUBCUTANEOUS NIGHTLY
Qty: 12 PEN | Refills: 12 | Status: SHIPPED
Start: 2021-02-10 | End: 2021-09-01

## 2021-02-10 RX ORDER — PRAVASTATIN SODIUM 20 MG
20 TABLET ORAL DAILY
Qty: 90 TABLET | Refills: 3 | Status: SHIPPED
Start: 2021-02-10 | End: 2022-01-05 | Stop reason: SDUPTHER

## 2021-02-10 RX ORDER — CARVEDILOL 6.25 MG/1
6.25 TABLET ORAL 2 TIMES DAILY
Qty: 180 TABLET | Refills: 3 | Status: SHIPPED
Start: 2021-02-10 | End: 2022-01-05 | Stop reason: SDUPTHER

## 2021-02-10 ASSESSMENT — ENCOUNTER SYMPTOMS
EYES NEGATIVE: 1
GASTROINTESTINAL NEGATIVE: 1
RESPIRATORY NEGATIVE: 1
ALLERGIC/IMMUNOLOGIC NEGATIVE: 1
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
## 2021-02-10 NOTE — TELEPHONE ENCOUNTER
Patient calling two different scripts were sent for levemir vials and pens and different doses. Asking which is correct.

## 2021-03-03 RX ORDER — GLUCOSAMINE HCL/CHONDROITIN SU 500-400 MG
CAPSULE ORAL
Qty: 300 STRIP | Refills: 3 | Status: SHIPPED
Start: 2021-03-03 | End: 2021-06-03 | Stop reason: SDUPTHER

## 2021-03-03 NOTE — TELEPHONE ENCOUNTER
Name of Medication(s) Requested:  Glucose test stripts, tests 3-4 times a day    Pharmacy Requested:   [facility]    Medication(s) pended? [x] Yes  [] No    Last Appointment:  2/10/2021    Future appts:  Future Appointments   Date Time Provider Jane Garcia   3/12/2021  1:15 PM HAYLIE Jaime [facility]   6/9/2021  9:45 AM DO YULIANA Lewis [facility]   9/1/2021  1:00 PM [facility]        Does patient need call back?   [] Yes  [x] No

## 2021-05-24 DIAGNOSIS — I10 ESSENTIAL HYPERTENSION: Chronic | ICD-10-CM

## 2021-05-24 DIAGNOSIS — I25.10 CORONARY ARTERY DISEASE INVOLVING NATIVE CORONARY ARTERY OF NATIVE HEART WITHOUT ANGINA PECTORIS: Chronic | ICD-10-CM

## 2021-05-24 DIAGNOSIS — E10.65 UNCONTROLLED TYPE 1 DIABETES MELLITUS WITH HYPERGLYCEMIA (HCC): ICD-10-CM

## 2021-05-24 DIAGNOSIS — E78.2 MIXED HYPERLIPIDEMIA: ICD-10-CM

## 2021-05-24 LAB
ALBUMIN SERPL-MCNC: 4.2 G/DL (ref 3.5–5.2)
ALP BLD-CCNC: 68 U/L (ref 40–129)
ALT SERPL-CCNC: 18 U/L (ref 0–40)
ANION GAP SERPL CALCULATED.3IONS-SCNC: 11 MMOL/L (ref 7–16)
AST SERPL-CCNC: 26 U/L (ref 0–39)
BASOPHILS ABSOLUTE: 0.05 E9/L (ref 0–0.2)
BASOPHILS RELATIVE PERCENT: 0.5 % (ref 0–2)
BILIRUB SERPL-MCNC: 0.4 MG/DL (ref 0–1.2)
BUN BLDV-MCNC: 17 MG/DL (ref 6–23)
CALCIUM SERPL-MCNC: 9.5 MG/DL (ref 8.6–10.2)
CHLORIDE BLD-SCNC: 104 MMOL/L (ref 98–107)
CHOLESTEROL, TOTAL: 161 MG/DL (ref 0–199)
CO2: 27 MMOL/L (ref 22–29)
CREAT SERPL-MCNC: 0.9 MG/DL (ref 0.7–1.2)
EOSINOPHILS ABSOLUTE: 2.27 E9/L (ref 0.05–0.5)
EOSINOPHILS RELATIVE PERCENT: 21.6 % (ref 0–6)
GFR AFRICAN AMERICAN: >60
GFR NON-AFRICAN AMERICAN: >60 ML/MIN/1.73
GLUCOSE BLD-MCNC: 118 MG/DL (ref 74–99)
HBA1C MFR BLD: 8.5 % (ref 4–5.6)
HCT VFR BLD CALC: 46.9 % (ref 37–54)
HDLC SERPL-MCNC: 40 MG/DL
HEMOGLOBIN: 14.3 G/DL (ref 12.5–16.5)
IMMATURE GRANULOCYTES #: 0.02 E9/L
IMMATURE GRANULOCYTES %: 0.2 % (ref 0–5)
LDL CHOLESTEROL CALCULATED: 99 MG/DL (ref 0–99)
LYMPHOCYTES ABSOLUTE: 2.2 E9/L (ref 1.5–4)
LYMPHOCYTES RELATIVE PERCENT: 21 % (ref 20–42)
MCH RBC QN AUTO: 27.2 PG (ref 26–35)
MCHC RBC AUTO-ENTMCNC: 30.5 % (ref 32–34.5)
MCV RBC AUTO: 89.2 FL (ref 80–99.9)
MICROALBUMIN UR-MCNC: 181.7 MG/L
MONOCYTES ABSOLUTE: 1.13 E9/L (ref 0.1–0.95)
MONOCYTES RELATIVE PERCENT: 10.8 % (ref 2–12)
NEUTROPHILS ABSOLUTE: 4.83 E9/L (ref 1.8–7.3)
NEUTROPHILS RELATIVE PERCENT: 45.9 % (ref 43–80)
PDW BLD-RTO: 14.5 FL (ref 11.5–15)
PLATELET # BLD: 185 E9/L (ref 130–450)
PMV BLD AUTO: 13.8 FL (ref 7–12)
POLYCHROMASIA: ABNORMAL
POTASSIUM SERPL-SCNC: 5.2 MMOL/L (ref 3.5–5)
RBC # BLD: 5.26 E12/L (ref 3.8–5.8)
SODIUM BLD-SCNC: 142 MMOL/L (ref 132–146)
TOTAL PROTEIN: 7.8 G/DL (ref 6.4–8.3)
TRIGL SERPL-MCNC: 109 MG/DL (ref 0–149)
VLDLC SERPL CALC-MCNC: 22 MG/DL
WBC # BLD: 10.5 E9/L (ref 4.5–11.5)

## 2021-06-03 ENCOUNTER — OFFICE VISIT (OUTPATIENT)
Dept: PRIMARY CARE CLINIC | Age: 86
End: 2021-06-03
Payer: MEDICARE

## 2021-06-03 VITALS
SYSTOLIC BLOOD PRESSURE: 128 MMHG | TEMPERATURE: 98.2 F | WEIGHT: 164 LBS | HEIGHT: 64 IN | DIASTOLIC BLOOD PRESSURE: 82 MMHG | BODY MASS INDEX: 28 KG/M2

## 2021-06-03 DIAGNOSIS — E03.9 ACQUIRED HYPOTHYROIDISM: ICD-10-CM

## 2021-06-03 DIAGNOSIS — M81.0 AGE-RELATED OSTEOPOROSIS WITHOUT CURRENT PATHOLOGICAL FRACTURE: ICD-10-CM

## 2021-06-03 DIAGNOSIS — E10.65 UNCONTROLLED TYPE 1 DIABETES MELLITUS WITH HYPERGLYCEMIA (HCC): ICD-10-CM

## 2021-06-03 DIAGNOSIS — K59.09 OTHER CONSTIPATION: Primary | ICD-10-CM

## 2021-06-03 DIAGNOSIS — I10 ESSENTIAL HYPERTENSION: Chronic | ICD-10-CM

## 2021-06-03 DIAGNOSIS — I25.10 CORONARY ARTERY DISEASE INVOLVING NATIVE CORONARY ARTERY OF NATIVE HEART WITHOUT ANGINA PECTORIS: Chronic | ICD-10-CM

## 2021-06-03 DIAGNOSIS — E78.2 MIXED HYPERLIPIDEMIA: Chronic | ICD-10-CM

## 2021-06-03 PROCEDURE — 99214 OFFICE O/P EST MOD 30 MIN: CPT | Performed by: FAMILY MEDICINE

## 2021-06-03 PROCEDURE — 3052F HG A1C>EQUAL 8.0%<EQUAL 9.0%: CPT | Performed by: FAMILY MEDICINE

## 2021-06-03 RX ORDER — GLUCOSAMINE HCL/CHONDROITIN SU 500-400 MG
CAPSULE ORAL
Qty: 300 STRIP | Refills: 12 | Status: SHIPPED
Start: 2021-06-03 | End: 2022-06-15 | Stop reason: SDUPTHER

## 2021-06-03 ASSESSMENT — PATIENT HEALTH QUESTIONNAIRE - PHQ9
SUM OF ALL RESPONSES TO PHQ QUESTIONS 1-9: 0
SUM OF ALL RESPONSES TO PHQ9 QUESTIONS 1 & 2: 0
2. FEELING DOWN, DEPRESSED OR HOPELESS: 0
SUM OF ALL RESPONSES TO PHQ QUESTIONS 1-9: 0
SUM OF ALL RESPONSES TO PHQ QUESTIONS 1-9: 0
1. LITTLE INTEREST OR PLEASURE IN DOING THINGS: 0

## 2021-06-03 NOTE — PROGRESS NOTES
6/3/21  Name: Xiomara Webster    : 1932    Sex: male    Age: 80 y.o. Subjective:  Chief Complaint: Patient is here for   4 m ock  Re  Diabetes  constiation      Chol   Lab a rhtritis     Feel ok   No cp ros ob  A rht  s x      Stiff in am       linxeaa  Only ting that helps   constaition      mayakeegan reyna ck at home  Lab with   Dec  8-5  To see  Vincent Sutton   Was due  april      Review of Systems   Constitutional: Negative. HENT: Negative. Eyes: Negative. Respiratory: Negative. Cardiovascular: Negative. Gastrointestinal: Negative. Endocrine: Negative. Genitourinary: Negative. Musculoskeletal: Positive for arthralgias. Skin: Negative. Allergic/Immunologic: Negative. Neurological: Negative. Hematological: Negative. Psychiatric/Behavioral: Negative. Current Outpatient Medications:     blood glucose monitor strips, Test 3 times a day & as needed for symptoms of irregular blood glucose. Contour next test strips, Disp: 300 strip, Rfl: 12    Insulin Syringes, Disposable, U-100 0.3 ML MISC, 1 each by Does not apply route 3 times daily, Disp: 300 each, Rfl: 12    linaclotide (LINZESS) 145 MCG capsule, Take 1 capsule by mouth every morning (before breakfast), Disp: 30 capsule, Rfl: 12    blood glucose test strips (ASCENSIA AUTODISC VI;ONE TOUCH ULTRA TEST VI) strip, 1 each by In Vitro route daily As needed. , Disp: 100 each, Rfl: 3    insulin aspart (NOVOLOG) 100 UNIT/ML injection vial, Inject 20 Units into the skin 3 times daily (before meals), Disp: 12 vial, Rfl: 5    pravastatin (PRAVACHOL) 20 MG tablet, Take 1 tablet by mouth daily, Disp: 90 tablet, Rfl: 3    carvedilol (COREG) 6.25 MG tablet, Take 1 tablet by mouth 2 times daily, Disp: 180 tablet, Rfl: 3    insulin detemir (LEVEMIR) 100 UNIT/ML injection vial, Inject 60 Units into the skin nightly, Disp: 18 vial, Rfl: 5    insulin detemir (LEVEMIR FLEXTOUCH) 100 UNIT/ML injection pen, Inject 66 Units into the skin nightly, Disp: 12 pen, Rfl: 12    dextrose 5 % SOLN, Infuse intravenously SHOT B a pill taken daily, Disp: , Rfl:     losartan (COZAAR) 100 MG tablet, Take 1 tablet by mouth daily, Disp: 90 tablet, Rfl: 5    latanoprost (XALATAN) 0.005 % ophthalmic solution, Place 1 drop into the left eye nightly , Disp: , Rfl:     aspirin 81 MG tablet, Take 81 mg by mouth daily Patient stopped taking 1 year ago, Disp: , Rfl:     Insulin Pen Needle (B-D UF III MINI PEN NEEDLES) 31G X 5 MM MISC, 1 each by Does not apply route daily, Disp: , Rfl:   No Known Allergies  Social History     Socioeconomic History    Marital status:      Spouse name: Not on file    Number of children: Not on file    Years of education: Not on file    Highest education level: Not on file   Occupational History    Occupation: [employer] 35 years [place] [employer]    Tobacco Use    Smoking status: Former Smoker     Types: Cigarettes    Smokeless tobacco: Never Used    Tobacco comment: 1988   Vaping Use    Vaping Use: Never used   Substance and Sexual Activity    Alcohol use: No    Drug use: No    Sexual activity: Not on file   Other Topics Concern    Not on file   Social History Narrative     for 10 years. 3 children with his first wife. Diabetes since 1984. Hypertension    Insomnia    Hyperlipidemia with last cholesterol 260 7 January 2019    Diabetic neuropathy    Osteoarthritis    Negative carotid ultrasound August 2018    Constipation taking Linzess every 3 days    Retired     Colonoscopy done few years ago. Osteoarthritis and chronic pain off Vicodin at age 54.   Was on for 17 years    Glaucoma    RBBB  Sent to cardio  Dr Valentine----tmt abnormal  And   Setting up echo and started coreg     Social Determinants of Health     Financial Resource Strain:     Difficulty of Paying Living Expenses:    Food Insecurity:     Worried About Running Out of Food in the Last Year:     [address] in the Rate and Rhythm: Normal rate and regular rhythm. Pulmonary:      Effort: Pulmonary effort is normal.      Breath sounds: Normal breath sounds. Abdominal:      Palpations: Abdomen is soft. Musculoskeletal:         General: Normal range of motion. Cervical back: Normal range of motion. Skin:     General: Skin is warm. Neurological:      Mental Status: He is alert and oriented to person, place, and time. Psychiatric:         Behavior: Behavior normal.         Diagnoses and all orders for this visit:    Other constipation  -     linaclotide (LINZESS) 145 MCG capsule; Take 1 capsule by mouth every morning (before breakfast)    Uncontrolled type 1 diabetes mellitus with hyperglycemia (HCC)  -     blood glucose monitor strips; Test 3 times a day & as needed for symptoms of irregular blood glucose. Contour next test strips  -     Insulin Syringes, Disposable, U-100 0.3 ML MISC; 1 each by Does not apply route 3 times daily    Essential hypertension    Mixed hyperlipidemia    Coronary artery disease involving native coronary artery of native heart without angina pectoris        Comments: low    Fat and sugar d iet   Fu with brinda griffithoe w t  Ck bs  qti      Ck bp home          Check blood pressure at home twice a day. Low-salt low caffeine diet. Call if systolic blood pressure is above 050 and diastolic blood pressures above 85. Only use a upper arm digital cuff. Aggressive low-fat diet. Avoid red meats, greasy fried foods, dairy products. Avoid processed foods. Take cholesterol medications without food. Check blood sugars 4 times a day. Aggressive low, sugar low carbohydrate diet. Call if blood sugar less than 70 or over 200. Avoid eating in between meals. Lose weight. Exercise. A great deal of time spent reviewing medications, diet, exercise, social issues. Also reviewing the chart before entering the room with patient and finishing charting after leaving patient's room.  More than half of that time was spent face to face with the patient in counseling and coordinating care. Follow Up: Return in about 4 months (around 10/3/2021) for Lab Before--cancel 6-24 appt wioth me.      Seen by:  Ingrid Garcia, DO

## 2021-06-29 ENCOUNTER — OFFICE VISIT (OUTPATIENT)
Dept: CARDIOLOGY CLINIC | Age: 86
End: 2021-06-29
Payer: MEDICARE

## 2021-06-29 VITALS
DIASTOLIC BLOOD PRESSURE: 70 MMHG | SYSTOLIC BLOOD PRESSURE: 142 MMHG | HEIGHT: 65 IN | BODY MASS INDEX: 27.11 KG/M2 | WEIGHT: 162.7 LBS | HEART RATE: 68 BPM | RESPIRATION RATE: 16 BRPM

## 2021-06-29 DIAGNOSIS — I25.10 CORONARY ARTERY DISEASE INVOLVING NATIVE CORONARY ARTERY OF NATIVE HEART WITHOUT ANGINA PECTORIS: Primary | ICD-10-CM

## 2021-06-29 PROCEDURE — 93000 ELECTROCARDIOGRAM COMPLETE: CPT | Performed by: INTERNAL MEDICINE

## 2021-06-29 PROCEDURE — 99214 OFFICE O/P EST MOD 30 MIN: CPT | Performed by: INTERNAL MEDICINE
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
## 2021-09-01 ENCOUNTER — OFFICE VISIT (OUTPATIENT)
Dept: ENDOCRINOLOGY | Age: 86
End: 2021-09-01
Payer: MEDICARE

## 2021-09-01 VITALS
OXYGEN SATURATION: 97 % | SYSTOLIC BLOOD PRESSURE: 173 MMHG | BODY MASS INDEX: 28.17 KG/M2 | WEIGHT: 165 LBS | DIASTOLIC BLOOD PRESSURE: 68 MMHG | HEART RATE: 61 BPM | HEIGHT: 64 IN

## 2021-09-01 DIAGNOSIS — E55.9 VITAMIN D DEFICIENCY: ICD-10-CM

## 2021-09-01 DIAGNOSIS — E11.65 TYPE 2 DIABETES MELLITUS WITH HYPERGLYCEMIA, WITH LONG-TERM CURRENT USE OF INSULIN (HCC): Primary | ICD-10-CM

## 2021-09-01 DIAGNOSIS — E10.65 UNCONTROLLED TYPE 1 DIABETES MELLITUS WITH HYPERGLYCEMIA (HCC): ICD-10-CM

## 2021-09-01 DIAGNOSIS — Z79.4 TYPE 2 DIABETES MELLITUS WITH HYPERGLYCEMIA, WITH LONG-TERM CURRENT USE OF INSULIN (HCC): Primary | ICD-10-CM

## 2021-09-01 LAB — HBA1C MFR BLD: 8.4 %

## 2021-09-01 PROCEDURE — 83036 HEMOGLOBIN GLYCOSYLATED A1C: CPT | Performed by: INTERNAL MEDICINE

## 2021-09-01 PROCEDURE — 3052F HG A1C>EQUAL 8.0%<EQUAL 9.0%: CPT | Performed by: INTERNAL MEDICINE

## 2021-09-01 PROCEDURE — 99204 OFFICE O/P NEW MOD 45 MIN: CPT | Performed by: INTERNAL MEDICINE

## 2021-09-01 RX ORDER — PEN NEEDLE, DIABETIC 32 GX 1/4"
NEEDLE, DISPOSABLE MISCELLANEOUS
Qty: 250 EACH | Refills: 5 | Status: SHIPPED
Start: 2021-09-01 | End: 2022-04-26

## 2021-09-01 RX ORDER — INSULIN ASPART 100 [IU]/ML
INJECTION, SOLUTION INTRAVENOUS; SUBCUTANEOUS
Qty: 25 PEN | Refills: 3 | Status: SHIPPED
Start: 2021-09-01 | End: 2022-04-26 | Stop reason: SDUPTHER

## 2021-09-01 RX ORDER — FLASH GLUCOSE SCANNING READER
1 EACH MISCELLANEOUS ONCE
Qty: 1 EACH | Refills: 0 | Status: SHIPPED | OUTPATIENT
Start: 2021-09-01 | End: 2021-09-01

## 2021-09-01 RX ORDER — INSULIN DETEMIR 100 [IU]/ML
40 INJECTION, SOLUTION SUBCUTANEOUS NIGHTLY
Qty: 25 PEN | Refills: 3 | Status: SHIPPED
Start: 2021-09-01 | End: 2022-04-26 | Stop reason: SDUPTHER

## 2021-09-01 RX ORDER — FLASH GLUCOSE SENSOR
KIT MISCELLANEOUS
Qty: 6 EACH | Refills: 3 | Status: SHIPPED | OUTPATIENT
Start: 2021-09-01 | End: 2022-08-31 | Stop reason: SDUPTHER

## 2021-09-01 NOTE — PATIENT INSTRUCTIONS
Recommendations for today's visit  · Change Levemir 40 units daily at night   · Take Novolog 10 units with meals + sliding scale   If blood sugars are 150-200 -add 1 units of Novolog    If blood sugars are 201-250 - add 2 units of Novolog   If blood sugars are 251-300 - add 3 units of Novolog   If blood sugars are 301-350 - add 4 units of Novolog   If blood sugars are above 350 - add 5 units of Novolog    · Check Blood sugar 4 times/day before meals and at bedtime and send us sugar log in a week      These are your blood sugar, blood pressure, cholesterol and A1c goals:  · Blood sugar fastin mg/dl to 130 mg/dl  · Blood sugar before meals: <150 mg/dl  · Peak blood sugar lower than 180 mg/dl  · A1c: between 6.5 - 7.5%    I you have any questions please call Dr. Nemesio Tabor office     Franki Tiejrina MD  Endocrinologist, [facility])   [address] [place] [address], [address] [place] [address],[facility] [id number]   Phone: [phone]  Fax: [phone]  Email: [email or web address]
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
## 2021-09-15 ENCOUNTER — TELEPHONE (OUTPATIENT)
Dept: ENDOCRINOLOGY | Age: 86
End: 2021-09-15

## 2021-09-15 NOTE — TELEPHONE ENCOUNTER
Called pt and informed to decrease levemir to 36U at night according to recent [employer]. Pt verbalized understanding.

## 2021-10-08 ENCOUNTER — TELEPHONE (OUTPATIENT)
Dept: ENDOCRINOLOGY | Age: 86
End: 2021-10-08

## 2021-10-13 ENCOUNTER — OFFICE VISIT (OUTPATIENT)
Dept: PRIMARY CARE CLINIC | Age: 86
End: 2021-10-13
Payer: MEDICARE

## 2021-10-13 VITALS — HEIGHT: 64 IN | BODY MASS INDEX: 27.66 KG/M2 | WEIGHT: 162 LBS | TEMPERATURE: 98.1 F

## 2021-10-13 DIAGNOSIS — Z00.00 ROUTINE GENERAL MEDICAL EXAMINATION AT A HEALTH CARE FACILITY: ICD-10-CM

## 2021-10-13 DIAGNOSIS — E10.65 UNCONTROLLED TYPE 1 DIABETES MELLITUS WITH HYPERGLYCEMIA (HCC): Chronic | ICD-10-CM

## 2021-10-13 DIAGNOSIS — E55.9 VITAMIN D DEFICIENCY: ICD-10-CM

## 2021-10-13 DIAGNOSIS — K59.00 CONSTIPATION, UNSPECIFIED CONSTIPATION TYPE: Chronic | ICD-10-CM

## 2021-10-13 DIAGNOSIS — E78.2 MIXED HYPERLIPIDEMIA: Chronic | ICD-10-CM

## 2021-10-13 DIAGNOSIS — I10 ESSENTIAL HYPERTENSION: Chronic | ICD-10-CM

## 2021-10-13 DIAGNOSIS — Z23 NEED FOR INFLUENZA VACCINATION: Primary | ICD-10-CM

## 2021-10-13 DIAGNOSIS — I25.10 CORONARY ARTERY DISEASE INVOLVING NATIVE CORONARY ARTERY OF NATIVE HEART WITHOUT ANGINA PECTORIS: Chronic | ICD-10-CM

## 2021-10-13 DIAGNOSIS — Z12.5 PROSTATE CANCER SCREENING: ICD-10-CM

## 2021-10-13 PROCEDURE — G0439 PPPS, SUBSEQ VISIT: HCPCS | Performed by: FAMILY MEDICINE

## 2021-10-13 PROCEDURE — 90694 VACC AIIV4 NO PRSRV 0.5ML IM: CPT | Performed by: FAMILY MEDICINE

## 2021-10-13 PROCEDURE — 3052F HG A1C>EQUAL 8.0%<EQUAL 9.0%: CPT | Performed by: FAMILY MEDICINE

## 2021-10-13 PROCEDURE — G0008 ADMIN INFLUENZA VIRUS VAC: HCPCS | Performed by: FAMILY MEDICINE

## 2021-10-13 ASSESSMENT — LIFESTYLE VARIABLES
AUDIT TOTAL SCORE: INCOMPLETE
AUDIT-C TOTAL SCORE: INCOMPLETE
HOW OFTEN DO YOU HAVE A DRINK CONTAINING ALCOHOL: 0
HOW OFTEN DO YOU HAVE A DRINK CONTAINING ALCOHOL: NEVER

## 2021-10-13 ASSESSMENT — PATIENT HEALTH QUESTIONNAIRE - PHQ9
SUM OF ALL RESPONSES TO PHQ9 QUESTIONS 1 & 2: 0
SUM OF ALL RESPONSES TO PHQ QUESTIONS 1-9: 0
1. LITTLE INTEREST OR PLEASURE IN DOING THINGS: 0
SUM OF ALL RESPONSES TO PHQ QUESTIONS 1-9: 0
2. FEELING DOWN, DEPRESSED OR HOPELESS: 0
SUM OF ALL RESPONSES TO PHQ QUESTIONS 1-9: 0

## 2021-10-13 NOTE — PATIENT INSTRUCTIONS
Personalized Preventive Plan for Sam Fisher - 10/13/2021  Medicare offers a range of preventive health benefits. Some of the tests and screenings are paid in full while other may be subject to a deductible, co-insurance, and/or copay. Some of these benefits include a comprehensive review of your medical history including lifestyle, illnesses that may run in your family, and various assessments and screenings as appropriate. After reviewing your medical record and screening and assessments performed today your provider may have ordered immunizations, labs, imaging, and/or referrals for you. A list of these orders (if applicable) as well as your Preventive Care list are included within your After Visit Summary for your review. Other Preventive Recommendations:    · A preventive eye exam performed by an eye specialist is recommended every 1-2 years to screen for glaucoma; cataracts, macular degeneration, and other eye disorders. · A preventive dental visit is recommended every 6 months. · Try to get at least 150 minutes of exercise per week or 10,000 steps per day on a pedometer . · Order or download the FREE \"Exercise & Physical Activity: Your Everyday Guide\" from [employer]. Call 7-851.271.2962 or search [employer] online. · You need 1265-5576 mg of calcium and 3134-5363 IU of vitamin D per day. It is possible to meet your calcium requirement with diet alone, but a vitamin D supplement is usually necessary to meet this goal.  · When exposed to the sun, use a sunscreen that protects against both UVA and UVB radiation with an SPF of 30 or greater. Reapply every 2 to 3 hours or after sweating, drying off with a towel, or swimming. · Always wear a seat belt when traveling in a car. Always wear a helmet when riding a bicycle or motorcycle.
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
## 2021-11-17 ENCOUNTER — TELEPHONE (OUTPATIENT)
Dept: ENDOCRINOLOGY | Age: 86
End: 2021-11-17

## 2021-12-09 ENCOUNTER — HOSPITAL ENCOUNTER (EMERGENCY)
Age: 86
Discharge: HOME | End: 2021-12-09
Attending: EMERGENCY MEDICINE
Payer: MEDICARE

## 2021-12-09 ENCOUNTER — APPOINTMENT (OUTPATIENT)
Dept: CT IMAGING | Age: 86
End: 2021-12-09
Payer: MEDICARE

## 2021-12-09 VITALS
HEART RATE: 81 BPM | DIASTOLIC BLOOD PRESSURE: 65 MMHG | SYSTOLIC BLOOD PRESSURE: 145 MMHG | WEIGHT: 162 LBS | OXYGEN SATURATION: 99 % | BODY MASS INDEX: 26.99 KG/M2 | HEIGHT: 65 IN | RESPIRATION RATE: 16 BRPM

## 2021-12-09 DIAGNOSIS — E27.8 ADRENAL MASS 1 CM TO 4 CM IN DIAMETER (HCC): ICD-10-CM

## 2021-12-09 DIAGNOSIS — R10.84 GENERALIZED ABDOMINAL PAIN: ICD-10-CM

## 2021-12-09 DIAGNOSIS — K57.90 DIVERTICULOSIS: Primary | ICD-10-CM

## 2021-12-09 LAB
ALBUMIN SERPL-MCNC: 4.6 G/DL (ref 3.5–5.2)
ALP BLD-CCNC: 89 U/L (ref 40–129)
ALT SERPL-CCNC: 20 U/L (ref 0–40)
AMORPHOUS: ABNORMAL
ANION GAP SERPL CALCULATED.3IONS-SCNC: 15 MMOL/L (ref 7–16)
AST SERPL-CCNC: 20 U/L (ref 0–39)
BACTERIA: ABNORMAL /HPF
BASOPHILS ABSOLUTE: 0.05 E9/L (ref 0–0.2)
BASOPHILS RELATIVE PERCENT: 0.4 % (ref 0–2)
BETA-HYDROXYBUTYRATE: 0.95 MMOL/L (ref 0.02–0.27)
BILIRUB SERPL-MCNC: 0.6 MG/DL (ref 0–1.2)
BILIRUBIN URINE: NEGATIVE
BLOOD, URINE: ABNORMAL
BUN BLDV-MCNC: 18 MG/DL (ref 6–23)
CALCIUM SERPL-MCNC: 10.1 MG/DL (ref 8.6–10.2)
CHLORIDE BLD-SCNC: 99 MMOL/L (ref 98–107)
CLARITY: ABNORMAL
CO2: 22 MMOL/L (ref 22–29)
COLOR: YELLOW
CREAT SERPL-MCNC: 0.9 MG/DL (ref 0.7–1.2)
EOSINOPHILS ABSOLUTE: 0.83 E9/L (ref 0.05–0.5)
EOSINOPHILS RELATIVE PERCENT: 7 % (ref 0–6)
GFR AFRICAN AMERICAN: >60
GFR NON-AFRICAN AMERICAN: >60 ML/MIN/1.73
GLUCOSE BLD-MCNC: 215 MG/DL (ref 74–99)
GLUCOSE URINE: 100 MG/DL
HCT VFR BLD CALC: 44 % (ref 37–54)
HEMOGLOBIN: 14.5 G/DL (ref 12.5–16.5)
IMMATURE GRANULOCYTES #: 0.04 E9/L
IMMATURE GRANULOCYTES %: 0.3 % (ref 0–5)
KETONES, URINE: 40 MG/DL
LACTIC ACID: 2.1 MMOL/L (ref 0.5–2.2)
LEUKOCYTE ESTERASE, URINE: ABNORMAL
LIPASE: 16 U/L (ref 13–60)
LYMPHOCYTES ABSOLUTE: 1.32 E9/L (ref 1.5–4)
LYMPHOCYTES RELATIVE PERCENT: 11.2 % (ref 20–42)
MCH RBC QN AUTO: 27.2 PG (ref 26–35)
MCHC RBC AUTO-ENTMCNC: 33 % (ref 32–34.5)
MCV RBC AUTO: 82.6 FL (ref 80–99.9)
MONOCYTES ABSOLUTE: 0.71 E9/L (ref 0.1–0.95)
MONOCYTES RELATIVE PERCENT: 6 % (ref 2–12)
NEUTROPHILS ABSOLUTE: 8.88 E9/L (ref 1.8–7.3)
NEUTROPHILS RELATIVE PERCENT: 75.1 % (ref 43–80)
NITRITE, URINE: NEGATIVE
PDW BLD-RTO: 14.1 FL (ref 11.5–15)
PH UA: 8.5 (ref 5–9)
PLATELET # BLD: 196 E9/L (ref 130–450)
PMV BLD AUTO: 14.1 FL (ref 7–12)
POTASSIUM SERPL-SCNC: 4.1 MMOL/L (ref 3.5–5)
PROTEIN UA: 100 MG/DL
RBC # BLD: 5.33 E12/L (ref 3.8–5.8)
RBC UA: ABNORMAL /HPF (ref 0–2)
SARS-COV-2, NAAT: NOT DETECTED
SODIUM BLD-SCNC: 136 MMOL/L (ref 132–146)
SPECIFIC GRAVITY UA: 1.02 (ref 1–1.03)
TOTAL PROTEIN: 8.5 G/DL (ref 6.4–8.3)
UROBILINOGEN, URINE: 0.2 E.U./DL
WBC # BLD: 11.8 E9/L (ref 4.5–11.5)
WBC UA: ABNORMAL /HPF (ref 0–5)

## 2021-12-09 PROCEDURE — 80053 COMPREHEN METABOLIC PANEL: CPT

## 2021-12-09 PROCEDURE — 82010 KETONE BODYS QUAN: CPT

## 2021-12-09 PROCEDURE — 87088 URINE BACTERIA CULTURE: CPT

## 2021-12-09 PROCEDURE — 96375 TX/PRO/DX INJ NEW DRUG ADDON: CPT

## 2021-12-09 PROCEDURE — 6360000002 HC RX W HCPCS: Performed by: EMERGENCY MEDICINE

## 2021-12-09 PROCEDURE — 87077 CULTURE AEROBIC IDENTIFY: CPT

## 2021-12-09 PROCEDURE — 83605 ASSAY OF LACTIC ACID: CPT

## 2021-12-09 PROCEDURE — 99285 EMERGENCY DEPT VISIT HI MDM: CPT

## 2021-12-09 PROCEDURE — 2580000003 HC RX 258: Performed by: RADIOLOGY

## 2021-12-09 PROCEDURE — 74177 CT ABD & PELVIS W/CONTRAST: CPT

## 2021-12-09 PROCEDURE — 96366 THER/PROPH/DIAG IV INF ADDON: CPT

## 2021-12-09 PROCEDURE — 85025 COMPLETE CBC W/AUTO DIFF WBC: CPT

## 2021-12-09 PROCEDURE — 96361 HYDRATE IV INFUSION ADD-ON: CPT

## 2021-12-09 PROCEDURE — 2500000003 HC RX 250 WO HCPCS: Performed by: EMERGENCY MEDICINE

## 2021-12-09 PROCEDURE — 6360000004 HC RX CONTRAST MEDICATION: Performed by: RADIOLOGY

## 2021-12-09 PROCEDURE — 2580000003 HC RX 258: Performed by: EMERGENCY MEDICINE

## 2021-12-09 PROCEDURE — 83690 ASSAY OF LIPASE: CPT

## 2021-12-09 PROCEDURE — C9113 INJ PANTOPRAZOLE SODIUM, VIA: HCPCS | Performed by: EMERGENCY MEDICINE

## 2021-12-09 PROCEDURE — 87635 SARS-COV-2 COVID-19 AMP PRB: CPT

## 2021-12-09 PROCEDURE — 96365 THER/PROPH/DIAG IV INF INIT: CPT

## 2021-12-09 PROCEDURE — 87186 SC STD MICRODIL/AGAR DIL: CPT

## 2021-12-09 PROCEDURE — 81001 URINALYSIS AUTO W/SCOPE: CPT

## 2021-12-09 RX ORDER — CEFDINIR 300 MG/1
300 CAPSULE ORAL 2 TIMES DAILY
Qty: 20 CAPSULE | Refills: 0 | Status: SHIPPED | OUTPATIENT
Start: 2021-12-09 | End: 2021-12-19

## 2021-12-09 RX ORDER — FENTANYL CITRATE 50 UG/ML
75 INJECTION, SOLUTION INTRAMUSCULAR; INTRAVENOUS ONCE
Status: COMPLETED | OUTPATIENT
Start: 2021-12-09 | End: 2021-12-09

## 2021-12-09 RX ORDER — HYDROCODONE BITARTRATE AND ACETAMINOPHEN 5; 325 MG/1; MG/1
1 TABLET ORAL EVERY 6 HOURS PRN
Qty: 12 TABLET | Refills: 0 | Status: SHIPPED | OUTPATIENT
Start: 2021-12-09 | End: 2021-12-12

## 2021-12-09 RX ORDER — 0.9 % SODIUM CHLORIDE 0.9 %
1000 INTRAVENOUS SOLUTION INTRAVENOUS ONCE
Status: COMPLETED | OUTPATIENT
Start: 2021-12-09 | End: 2021-12-09

## 2021-12-09 RX ORDER — SODIUM CHLORIDE 0.9 % (FLUSH) 0.9 %
10 SYRINGE (ML) INJECTION ONCE
Status: COMPLETED | OUTPATIENT
Start: 2021-12-09 | End: 2021-12-09

## 2021-12-09 RX ORDER — ONDANSETRON 2 MG/ML
4 INJECTION INTRAMUSCULAR; INTRAVENOUS ONCE
Status: COMPLETED | OUTPATIENT
Start: 2021-12-09 | End: 2021-12-09

## 2021-12-09 RX ORDER — METRONIDAZOLE 500 MG/1
500 TABLET ORAL 4 TIMES DAILY
Qty: 40 TABLET | Refills: 0 | Status: SHIPPED | OUTPATIENT
Start: 2021-12-09 | End: 2021-12-19

## 2021-12-09 RX ORDER — PANTOPRAZOLE SODIUM 40 MG/10ML
40 INJECTION, POWDER, LYOPHILIZED, FOR SOLUTION INTRAVENOUS ONCE
Status: COMPLETED | OUTPATIENT
Start: 2021-12-09 | End: 2021-12-09

## 2021-12-09 RX ADMIN — Medication 10 ML: at 18:03

## 2021-12-09 RX ADMIN — ONDANSETRON 4 MG: 2 INJECTION INTRAMUSCULAR; INTRAVENOUS at 15:58

## 2021-12-09 RX ADMIN — FENTANYL CITRATE 75 MCG: 50 INJECTION INTRAMUSCULAR; INTRAVENOUS at 15:58

## 2021-12-09 RX ADMIN — METRONIDAZOLE 500 MG: 500 INJECTION, SOLUTION INTRAVENOUS at 19:47

## 2021-12-09 RX ADMIN — CEFTRIAXONE 1000 MG: 1 INJECTION, POWDER, FOR SOLUTION INTRAMUSCULAR; INTRAVENOUS at 19:46

## 2021-12-09 RX ADMIN — IOPAMIDOL 90 ML: 755 INJECTION, SOLUTION INTRAVENOUS at 18:03

## 2021-12-09 RX ADMIN — PANTOPRAZOLE SODIUM 40 MG: 40 INJECTION, POWDER, FOR SOLUTION INTRAVENOUS at 15:45

## 2021-12-09 RX ADMIN — SODIUM CHLORIDE 1000 ML: 9 INJECTION, SOLUTION INTRAVENOUS at 15:49

## 2021-12-09 ASSESSMENT — PAIN DESCRIPTION - FREQUENCY
FREQUENCY: CONTINUOUS
FREQUENCY: CONTINUOUS

## 2021-12-09 ASSESSMENT — PAIN DESCRIPTION - DESCRIPTORS
DESCRIPTORS: CRAMPING
DESCRIPTORS: PRESSURE

## 2021-12-09 ASSESSMENT — PAIN DESCRIPTION - PAIN TYPE
TYPE: ACUTE PAIN
TYPE: ACUTE PAIN

## 2021-12-09 ASSESSMENT — PAIN - FUNCTIONAL ASSESSMENT: PAIN_FUNCTIONAL_ASSESSMENT: PREVENTS OR INTERFERES SOME ACTIVE ACTIVITIES AND ADLS

## 2021-12-09 ASSESSMENT — PAIN DESCRIPTION - ORIENTATION
ORIENTATION: OTHER (COMMENT)
ORIENTATION: UPPER

## 2021-12-09 ASSESSMENT — PAIN DESCRIPTION - PROGRESSION
CLINICAL_PROGRESSION: GRADUALLY IMPROVING
CLINICAL_PROGRESSION: RAPIDLY IMPROVING

## 2021-12-09 ASSESSMENT — PAIN DESCRIPTION - LOCATION
LOCATION: ABDOMEN
LOCATION: ABDOMEN

## 2021-12-09 ASSESSMENT — PAIN SCALES - GENERAL
PAINLEVEL_OUTOF10: 2
PAINLEVEL_OUTOF10: 10
PAINLEVEL_OUTOF10: 5
PAINLEVEL_OUTOF10: 4

## 2021-12-09 ASSESSMENT — PAIN SCALES - WONG BAKER: WONGBAKER_NUMERICALRESPONSE: 2

## 2021-12-09 ASSESSMENT — PAIN DESCRIPTION - ONSET: ONSET: SUDDEN

## 2021-12-09 NOTE — ED PROVIDER NOTES
HPI:  12/9/21,   Time: 2:37 PM JOSÉ MIGUEL Hill is a 80 y.o. male presenting to the ED for lower abdominal pain vomiting gastric contents he describes as dark in color. , beginning 8 hours ago. The complaint has been persistent, moderate in severity, and worsened by nothing. Patient was seen at urgent care center was given Zofran for nausea. States has not helped his nausea. He also suffers from peptic reflux disease. He denies melena hematochezia. He is passing gas. He has no chest pain. States has had no abdominal surgeries. No fevers or chills reported. Patient is overdue for COVID-19 booster. ROS:   Pertinent positives and negatives are stated within HPI, all other systems reviewed and are negative.  --------------------------------------------- PAST HISTORY ---------------------------------------------  Past Medical History:  has a past medical history of CAD (coronary artery disease), Chronic pain syndrome, Constipation, Degenerative joint disease involving multiple joints, Diabetes ([facility] [address] Diabetic neuropathy ([facility] [address] Glaucoma, Hyperlipidemia, Hypertension, Insomnia, Multiple vessel coronary artery disease, Osteoarthritis, Paresthesia of right leg, Peptic reflux disease, Raised prostate specific antigen, and Type 2 diabetes mellitus without complication ([facility] [address] Past Surgical History:  has a past surgical history that includes Facial cosmetic surgery and Cataract removal.    Social History:  reports that he has quit smoking. His smoking use included cigarettes. He has never used smokeless tobacco. He reports that he does not drink alcohol and does not use drugs. Family History: family history includes Cancer in his mother and sister; Heart Attack in his mother; Heart Disease in his mother; Kidney Disease in his brother; Other in his father. The patients home medications have been reviewed.     Allergies: No known allergies    -------------------------------------------------- RESULTS CREATININE 0.9 0.7 - 1.2 mg/dL    GFR Non-African American >60 >=60 mL/min/1.73    GFR African American >60     Calcium 10.1 8.6 - 10.2 mg/dL    Total Protein 8.5 (H) 6.4 - 8.3 g/dL    Albumin 4.6 3.5 - 5.2 g/dL    Total Bilirubin 0.6 0.0 - 1.2 mg/dL    Alkaline Phosphatase 89 40 - 129 U/L    ALT 20 0 - 40 U/L    AST 20 0 - 39 U/L   Lipase   Result Value Ref Range    Lipase 16 13 - 60 U/L   Lactic Acid, Plasma   Result Value Ref Range    Lactic Acid 2.1 0.5 - 2.2 mmol/L   Urinalysis   Result Value Ref Range    Color, UA Yellow Straw/Yellow    Clarity, UA CLOUDY (A) Clear    Glucose, Ur 100 (A) Negative mg/dL    Bilirubin Urine Negative Negative    Ketones, Urine 40 (A) Negative mg/dL    Specific Gravity, UA 1.020 1.005 - 1.030    Blood, Urine TRACE-INTACT Negative    pH, UA 8.5 5.0 - 9.0    Protein,  (A) Negative mg/dL    Urobilinogen, Urine 0.2 <2.0 E.U./dL    Nitrite, Urine Negative Negative    Leukocyte Esterase, Urine SMALL (A) Negative   Beta-Hydroxybutyrate   Result Value Ref Range    Beta-Hydroxybutyrate 0.95 (H) 0.02 - 0.27 mmol/L   Microscopic Urinalysis   Result Value Ref Range    WBC, UA 5-10 (A) 0 - 5 /HPF    RBC, UA 1-3 0 - 2 /HPF    Bacteria, UA FEW (A) None Seen /HPF    Amorphous, UA MODERATE        RADIOLOGY:  Interpreted by Radiologist.  CT ABDOMEN PELVIS W IV CONTRAST Additional Contrast? None   Final Result   1. No acute abnormality is seen in the abdomen or the pelvis. 2. 1.5 cm right adrenal mass is grossly stable as of the CTA of the chest   from 10/04/2019, favoring a benign adenoma. 3. Mild sigmoid diverticulosis without diverticulitis. 4. Enlarged prostate.             ------------------------- NURSING NOTES AND VITALS REVIEWED ---------------------------   The nursing notes within the ED encounter and vital signs as below have been reviewed.    BP (!) 145/65   Pulse 81   Resp 16   Ht 5' 5\" (1.651 m)   Wt 162 lb (73.5 kg)   SpO2 99%   BMI 26.96 kg/m²   Oxygen Saturation Interpretation: Normal      ---------------------------------------------------PHYSICAL EXAM--------------------------------------      Constitutional/General: Alert and oriented x3, well appearing, non toxic in NAD  Head: NC/AT  Eyes: PERRL, EOMI  Mouth: Oropharynx clear, handling secretions, no trismus  Neck: Supple, full ROM, no meningeal signs  Pulmonary: Lungs clear to auscultation bilaterally, no wheezes, rales, or rhonchi. Not in respiratory distress  Cardiovascular:  Regular rate and rhythm, no murmurs, gallops, or rubs. 2+ distal pulses  Abdomen: Soft, non tender, non distended, no RT or PM   Extremities: Moves all extremities x 4. Warm and well perfused  Skin: warm and dry without rash  Neurologic: GCS 15, no Focal deficits. Psych: Normal Affect      ------------------------------ ED COURSE/MEDICAL DECISION MAKING----------------------  Medications   0.9 % sodium chloride bolus (0 mLs IntraVENous Stopped 12/9/21 2144)   pantoprazole (PROTONIX) injection 40 mg (40 mg IntraVENous Given 12/9/21 1545)   fentaNYL (SUBLIMAZE) injection 75 mcg (75 mcg IntraVENous Given 12/9/21 1558)   ondansetron (ZOFRAN) injection 4 mg (4 mg IntraVENous Given 12/9/21 1558)   sodium chloride flush 0.9 % injection 10 mL (10 mLs IntraVENous Given 12/9/21 1803)   iopamidol (ISOVUE-370) 76 % injection 90 mL (90 mLs IntraVENous Given 12/9/21 1803)   cefTRIAXone (ROCEPHIN) 1,000 mg in sterile water 10 mL IV syringe (0 mg IntraVENous Stopped 12/9/21 1951)   metronidazole (FLAGYL) 500 mg in NaCl 100 mL IVPB premix (0 mg IntraVENous Stopped 12/9/21 2144)         Medical Decision Making:    Labs reviewed CT shows diverticulitis  And 1.5 CM adrenal mass seen in previous CTA in 10/2019. Will treat with antibiotics for presumed diverticulitis. I did review CT findings with patient and family. He will be discharged home on Omnicef and Flagyl. 1800   On reevaluation patient said he feels a lot better after above treatment. Counseling: The emergency provider has spoken with the patient and discussed todays results, in addition to providing specific details for the plan of care and counseling regarding the diagnosis and prognosis. Questions are answered at this time and they are agreeable with the plan.      --------------------------------- IMPRESSION AND DISPOSITION ---------------------------------    IMPRESSION  1. Diverticulosis    2. Generalized abdominal pain    3.  Adrenal mass 1 cm to 4 cm in diameter [facility])        DISPOSITION  Disposition: Discharge to home  Patient condition is stable                Malia Mckinney DO  12/11/21 4969

## 2021-12-11 LAB
ORGANISM: ABNORMAL
URINE CULTURE, ROUTINE: ABNORMAL

## 2021-12-12 RX ORDER — AMOXICILLIN AND CLAVULANATE POTASSIUM 875; 125 MG/1; MG/1
1 TABLET, FILM COATED ORAL 3 TIMES DAILY
Qty: 21 TABLET | Refills: 0 | Status: SHIPPED | OUTPATIENT
Start: 2021-12-12 | End: 2021-12-19

## 2021-12-13 NOTE — ED NOTES
Reviewed patients after hours culture results. Urine culture growing Enterococcus faecalis >100k, patient discharged on cefdinir + metronidazole for diverticulitis. Discussed with Dr. Ioana Zacarias, will change to Augmentin 875mg TID x 7 days to treat both diverticulitis and UTI. I called the patient to explain results and need for change in therapy, all questions answered. Rx sent to [facility] on [address]. No need for further intervention at this time.     Otis Boles, PharmD, BCCCP 12/12/2021 10:05 PM  Clinical Pharmacy Specialist, Emergency Medicine  [phone]

## 2021-12-15 ENCOUNTER — OFFICE VISIT (OUTPATIENT)
Dept: PRIMARY CARE CLINIC | Age: 86
End: 2021-12-15
Payer: MEDICARE

## 2021-12-15 VITALS
BODY MASS INDEX: 26.46 KG/M2 | DIASTOLIC BLOOD PRESSURE: 78 MMHG | TEMPERATURE: 96.8 F | WEIGHT: 159 LBS | SYSTOLIC BLOOD PRESSURE: 128 MMHG

## 2021-12-15 DIAGNOSIS — K57.92 DIVERTICULITIS: Primary | ICD-10-CM

## 2021-12-15 DIAGNOSIS — I10 ESSENTIAL HYPERTENSION: Chronic | ICD-10-CM

## 2021-12-15 DIAGNOSIS — E27.8 ADRENAL MASS (HCC): ICD-10-CM

## 2021-12-15 DIAGNOSIS — E10.65 UNCONTROLLED TYPE 1 DIABETES MELLITUS WITH HYPERGLYCEMIA (HCC): Chronic | ICD-10-CM

## 2021-12-15 PROCEDURE — 3052F HG A1C>EQUAL 8.0%<EQUAL 9.0%: CPT | Performed by: FAMILY MEDICINE

## 2021-12-15 PROCEDURE — 99214 OFFICE O/P EST MOD 30 MIN: CPT | Performed by: FAMILY MEDICINE

## 2021-12-15 ASSESSMENT — PATIENT HEALTH QUESTIONNAIRE - PHQ9
SUM OF ALL RESPONSES TO PHQ QUESTIONS 1-9: 0
SUM OF ALL RESPONSES TO PHQ QUESTIONS 1-9: 0
1. LITTLE INTEREST OR PLEASURE IN DOING THINGS: 0
SUM OF ALL RESPONSES TO PHQ9 QUESTIONS 1 & 2: 0
2. FEELING DOWN, DEPRESSED OR HOPELESS: 0
SUM OF ALL RESPONSES TO PHQ QUESTIONS 1-9: 0

## 2021-12-15 ASSESSMENT — ENCOUNTER SYMPTOMS
GASTROINTESTINAL NEGATIVE: 1
ALLERGIC/IMMUNOLOGIC NEGATIVE: 1
EYES NEGATIVE: 1
RESPIRATORY NEGATIVE: 1
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
## 2021-12-17 ENCOUNTER — OFFICE VISIT (OUTPATIENT)
Dept: SURGERY | Age: 86
End: 2021-12-17
Payer: MEDICARE

## 2021-12-17 VITALS
RESPIRATION RATE: 18 BRPM | WEIGHT: 159 LBS | OXYGEN SATURATION: 97 % | TEMPERATURE: 97.5 F | DIASTOLIC BLOOD PRESSURE: 66 MMHG | HEIGHT: 65 IN | HEART RATE: 67 BPM | SYSTOLIC BLOOD PRESSURE: 170 MMHG | BODY MASS INDEX: 26.49 KG/M2

## 2021-12-17 DIAGNOSIS — K52.9 COLITIS: Primary | ICD-10-CM

## 2021-12-17 PROCEDURE — 99203 OFFICE O/P NEW LOW 30 MIN: CPT | Performed by: SURGERY

## 2021-12-17 NOTE — PROGRESS NOTES
Prior to Admission medications    Medication Sig Start Date End Date Taking? Authorizing Provider   amoxicillin-clavulanate (AUGMENTIN) 875-125 MG per tablet Take 1 tablet by mouth 3 times daily for 7 days 12/12/21 12/19/21 Yes Korey Pinzon,    cefdinir (OMNICEF) 300 MG capsule Take 1 capsule by mouth 2 times daily for 10 days 12/9/21 12/19/21 Yes Nataliya Martinez DO   metroNIDAZOLE (FLAGYL) 500 MG tablet Take 1 tablet by mouth 4 times daily for 10 days 12/9/21 12/19/21 Yes Nataliya Martinez,    Continuous Blood Gluc Sensor (FREESTYLE WARREN 2 SENSOR) MISC Change sensor every 14 days 9/1/21  Yes Lissa Estrada MD   insulin detemir (LEVEMIR FLEXTOUCH) 100 UNIT/ML injection pen Inject 40 Units into the skin nightly 9/1/21  Yes Lissa Estrada MD   insulin aspart (NOVOLOG FLEXPEN) 100 UNIT/ML injection pen Inject 10 units with meals + sliding scale. MAX 75U/day 9/1/21  Yes Lissa Estrada MD   Insulin Pen Needle (BD PEN NEEDLE MICRO U/F) 32G X 6 MM MISC Uses with insulin 4 times a day 9/1/21  Yes Lissa Estrada MD   blood glucose monitor strips Test 3 times a day & as needed for symptoms of irregular blood glucose. Contour next test strips 6/3/21  Yes Kris Davey DO   Insulin Syringes, Disposable, U-100 0.3 ML MISC 1 each by Does not apply route 3 times daily 6/3/21  Yes Kris Davey DO   linaclotide (LINZESS) 145 MCG capsule Take 1 capsule by mouth every morning (before breakfast) 6/3/21  Yes Kris Davey DO   blood glucose test strips (ASCENSIA AUTODISC VI;ONE TOUCH ULTRA TEST VI) strip 1 each by In Vitro route daily As needed.  3/3/21  Yes Kris Davey DO   pravastatin (PRAVACHOL) 20 MG tablet Take 1 tablet by mouth daily 2/10/21  Yes Kris Davey DO   carvedilol (COREG) 6.25 MG tablet Take 1 tablet by mouth 2 times daily 2/10/21  Yes Kris Davey DO   insulin detemir (LEVEMIR) 100 UNIT/ML injection vial Inject 60 Units into the skin nightly 2/10/21  Yes Kris GUY DO [place]   dextrose 5 % SOLN Infuse intravenously SHOT B a pill taken daily   Yes Historical Provider, MD   losartan (COZAAR) 100 MG tablet Take 1 tablet by mouth daily 10/16/20  Yes Kris Davey DO   latanoprost (XALATAN) 0.005 % ophthalmic solution Place 1 drop into the left eye nightly    Yes Historical Provider, MD   aspirin 81 MG tablet Take 81 mg by mouth daily Patient stopped taking 1 year ago   Yes Historical Provider, MD   Insulin Pen Needle (B-D UF III MINI PEN NEEDLES) 31G X 5 MM MISC 1 each by Does not apply route daily   Yes Historical Provider, MD       Allergies   Allergen Reactions    No Known Allergies        Social History     Socioeconomic History    Marital status:      Spouse name: None    Number of children: None    Years of education: None    Highest education level: None   Occupational History    Occupation: [employer] years [employer]    Tobacco Use    Smoking status: Former Smoker     Types: Cigarettes    Smokeless tobacco: Never Used    Tobacco comment: 1988   Vaping Use    Vaping Use: Never used   Substance and Sexual Activity    Alcohol use: No    Drug use: No    Sexual activity: None   Other Topics Concern    None   Social History Narrative     for 10 years. 3 children with his first wife. Diabetes since 1984. Hypertension    Insomnia    Hyperlipidemia with last cholesterol 260 7 January 2019    Diabetic neuropathy    Osteoarthritis    Negative carotid ultrasound August 2018    Constipation taking Linzess every 3 days    Retired     Colonoscopy done few years ago. Osteoarthritis and chronic pain off Vicodin at age 54.   Was on for 17 years    Glaucoma    RBBB  Sent to cardio  Dr Valentine----tmt abnormal  And   Setting up echo and started coreg    Er with diverticulitis     12-21     Social Determinants of Health     Financial Resource Strain:     Difficulty of Paying Living Expenses: Not on file   Food Insecurity:  Worried About Running Out of Food in the Last Year: Not on file    Hay of Food in the Last Year: Not on file   Transportation Needs:     Lack of Transportation (Medical): Not on file    Lack of Transportation (Non-Medical): Not on file   Physical Activity:     Days of Exercise per Week: Not on file    Minutes of Exercise per Session: Not on file   Stress:     Feeling of Stress : Not on file   Social Connections:     Frequency of Communication with Friends and Family: Not on file    Frequency of Social Gatherings with Friends and Family: Not on file    Attends [employer] Services: Not on file    Active Member of [address] [place] [address] [place] or Organizations: Not on file    Attends Club or Organization Meetings: Not on file    Marital Status: Not on file   Intimate Partner Violence:     Fear of Current or Ex-Partner: Not on file    Emotionally Abused: Not on file    Physically Abused: Not on file    Sexually Abused: Not on file   Housing Stability:     Unable to Pay for Housing in the Last Year: Not on file    Number of [place] in the Last Year: Not on file    Unstable Housing in the Last Year: Not on file       Family History   Problem Relation Age of Onset    Heart Disease Mother     Cancer Mother     Heart Attack Mother     Other Father         Pneumonia    Kidney Disease Brother     Cancer Sister        Review of Systems   All other systems reviewed and are negative. Objective:  Vitals:    12/17/21 1026   BP: (!) 170/66   Pulse: 67   Resp: 18   Temp: 97.5 °F (36.4 °C)   TempSrc: Temporal   SpO2: 97%   Weight: 159 lb (72.1 kg)   Height: 5' 5\" (1.651 m)          Physical Exam  Constitutional:       General: He is not in acute distress. Appearance: He is not diaphoretic. HENT:      Head: Normocephalic and atraumatic. Eyes:      General:         Right eye: No discharge. Left eye: No discharge. Neck:      Trachea: No tracheal deviation.    Cardiovascular:      Rate and Rhythm: Normal rate. Pulmonary:      Effort: Pulmonary effort is normal. No respiratory distress. Abdominal:      General: There is no distension. Palpations: Abdomen is soft. Tenderness: There is no abdominal tenderness. There is no guarding or rebound. Skin:     General: Skin is warm and dry. Neurological:      Mental Status: He is alert and oriented to person, place, and time. Manoj Gamboa MD      NOTE: This report, in part or full,may have been transcribed using voice recognition software. Every effort was made to ensure accuracy; however, inadvertent computerized transcription errors may be present. Please excuse any transcriptional grammatical or spelling errors that may have escaped my editorial review.     CC: Tiburcio Golden, DO

## 2021-12-23 ENCOUNTER — OFFICE VISIT (OUTPATIENT)
Dept: ENDOCRINOLOGY | Age: 86
End: 2021-12-23
Payer: MEDICARE

## 2021-12-23 VITALS
HEART RATE: 66 BPM | WEIGHT: 161 LBS | HEIGHT: 65 IN | DIASTOLIC BLOOD PRESSURE: 75 MMHG | BODY MASS INDEX: 26.82 KG/M2 | SYSTOLIC BLOOD PRESSURE: 179 MMHG

## 2021-12-23 DIAGNOSIS — E55.9 VITAMIN D DEFICIENCY: Primary | ICD-10-CM

## 2021-12-23 DIAGNOSIS — Z91.119 DIETARY NONCOMPLIANCE: ICD-10-CM

## 2021-12-23 DIAGNOSIS — Z79.4 TYPE 2 DIABETES MELLITUS WITH HYPERGLYCEMIA, WITH LONG-TERM CURRENT USE OF INSULIN (HCC): ICD-10-CM

## 2021-12-23 DIAGNOSIS — E11.65 TYPE 2 DIABETES MELLITUS WITH HYPERGLYCEMIA, WITH LONG-TERM CURRENT USE OF INSULIN (HCC): ICD-10-CM

## 2021-12-23 DIAGNOSIS — E78.5 HYPERLIPIDEMIA, UNSPECIFIED HYPERLIPIDEMIA TYPE: ICD-10-CM

## 2021-12-23 PROCEDURE — 3052F HG A1C>EQUAL 8.0%<EQUAL 9.0%: CPT | Performed by: INTERNAL MEDICINE

## 2021-12-23 PROCEDURE — 99214 OFFICE O/P EST MOD 30 MIN: CPT | Performed by: INTERNAL MEDICINE

## 2021-12-23 RX ORDER — SEMAGLUTIDE 1.34 MG/ML
0.5 INJECTION, SOLUTION SUBCUTANEOUS WEEKLY
Qty: 3 PEN | Refills: 3
Start: 2021-12-23 | End: 2022-07-28
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
## 2022-01-03 DIAGNOSIS — I10 ESSENTIAL HYPERTENSION: ICD-10-CM

## 2022-01-03 RX ORDER — LOSARTAN POTASSIUM 100 MG/1
100 TABLET ORAL DAILY
Qty: 90 TABLET | Refills: 5 | Status: SHIPPED | OUTPATIENT
Start: 2022-01-03

## 2022-01-04 DIAGNOSIS — E78.2 MIXED HYPERLIPIDEMIA: ICD-10-CM

## 2022-01-05 RX ORDER — CARVEDILOL 6.25 MG/1
6.25 TABLET ORAL 2 TIMES DAILY
Qty: 180 TABLET | Refills: 3 | Status: SHIPPED
Start: 2022-01-05 | End: 2022-01-31 | Stop reason: DRUGHIGH

## 2022-01-05 RX ORDER — PRAVASTATIN SODIUM 20 MG
20 TABLET ORAL DAILY
Qty: 90 TABLET | Refills: 3 | Status: SHIPPED | OUTPATIENT
Start: 2022-01-05

## 2022-01-14 DIAGNOSIS — Z79.4 TYPE 2 DIABETES MELLITUS WITH HYPERGLYCEMIA, WITH LONG-TERM CURRENT USE OF INSULIN (HCC): Primary | ICD-10-CM

## 2022-01-14 DIAGNOSIS — E11.65 TYPE 2 DIABETES MELLITUS WITH HYPERGLYCEMIA, WITH LONG-TERM CURRENT USE OF INSULIN (HCC): Primary | ICD-10-CM

## 2022-01-14 RX ORDER — INSULIN LISPRO 100 [IU]/ML
INJECTION, SOLUTION INTRAVENOUS; SUBCUTANEOUS
Qty: 45 ML | Refills: 3 | Status: SHIPPED
Start: 2022-01-14 | End: 2022-04-26

## 2022-01-26 ENCOUNTER — TELEPHONE (OUTPATIENT)
Dept: ENDOCRINOLOGY | Age: 87
End: 2022-01-26

## 2022-01-26 NOTE — TELEPHONE ENCOUNTER
Spoke with patient increase diabetic regimen Humalog 10/10/10 to 14/14/14 patient understood
Hiwot Jones

## 2022-01-31 ENCOUNTER — OFFICE VISIT (OUTPATIENT)
Dept: CARDIOLOGY CLINIC | Age: 87
End: 2022-01-31
Payer: MEDICARE

## 2022-01-31 VITALS
SYSTOLIC BLOOD PRESSURE: 144 MMHG | WEIGHT: 160 LBS | DIASTOLIC BLOOD PRESSURE: 80 MMHG | RESPIRATION RATE: 18 BRPM | HEART RATE: 69 BPM | BODY MASS INDEX: 26.66 KG/M2 | HEIGHT: 65 IN

## 2022-01-31 DIAGNOSIS — I25.10 CORONARY ARTERY DISEASE INVOLVING NATIVE CORONARY ARTERY OF NATIVE HEART WITHOUT ANGINA PECTORIS: Primary | ICD-10-CM

## 2022-01-31 PROCEDURE — 93000 ELECTROCARDIOGRAM COMPLETE: CPT | Performed by: INTERNAL MEDICINE

## 2022-01-31 PROCEDURE — 99214 OFFICE O/P EST MOD 30 MIN: CPT | Performed by: INTERNAL MEDICINE

## 2022-01-31 RX ORDER — CARVEDILOL 12.5 MG/1
12.5 TABLET ORAL 2 TIMES DAILY
Qty: 60 TABLET | Refills: 3 | Status: SHIPPED
Start: 2022-01-31 | End: 2022-07-07 | Stop reason: SDUPTHER
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
## 2022-02-10 DIAGNOSIS — E10.65 UNCONTROLLED TYPE 1 DIABETES MELLITUS WITH HYPERGLYCEMIA (HCC): Chronic | ICD-10-CM

## 2022-02-10 DIAGNOSIS — E55.9 VITAMIN D DEFICIENCY: ICD-10-CM

## 2022-02-10 DIAGNOSIS — E78.2 MIXED HYPERLIPIDEMIA: Chronic | ICD-10-CM

## 2022-02-10 DIAGNOSIS — Z12.5 PROSTATE CANCER SCREENING: ICD-10-CM

## 2022-02-10 DIAGNOSIS — I25.10 CORONARY ARTERY DISEASE INVOLVING NATIVE CORONARY ARTERY OF NATIVE HEART WITHOUT ANGINA PECTORIS: Chronic | ICD-10-CM

## 2022-02-10 LAB
ALBUMIN SERPL-MCNC: 4.1 G/DL (ref 3.5–5.2)
ALP BLD-CCNC: 69 U/L (ref 40–129)
ALT SERPL-CCNC: 15 U/L (ref 0–40)
ANION GAP SERPL CALCULATED.3IONS-SCNC: 13 MMOL/L (ref 7–16)
AST SERPL-CCNC: 23 U/L (ref 0–39)
BACTERIA: NORMAL /HPF
BASOPHILS ABSOLUTE: 0.05 E9/L (ref 0–0.2)
BASOPHILS RELATIVE PERCENT: 0.5 % (ref 0–2)
BILIRUB SERPL-MCNC: 0.4 MG/DL (ref 0–1.2)
BILIRUBIN URINE: NEGATIVE
BLOOD, URINE: ABNORMAL
BUN BLDV-MCNC: 15 MG/DL (ref 6–23)
CALCIUM SERPL-MCNC: 9.5 MG/DL (ref 8.6–10.2)
CHLORIDE BLD-SCNC: 101 MMOL/L (ref 98–107)
CHOLESTEROL, TOTAL: 162 MG/DL (ref 0–199)
CLARITY: CLEAR
CO2: 25 MMOL/L (ref 22–29)
COLOR: YELLOW
CREAT SERPL-MCNC: 0.9 MG/DL (ref 0.7–1.2)
EOSINOPHILS ABSOLUTE: 1.25 E9/L (ref 0.05–0.5)
EOSINOPHILS RELATIVE PERCENT: 12.6 % (ref 0–6)
GFR AFRICAN AMERICAN: >60
GFR NON-AFRICAN AMERICAN: >60 ML/MIN/1.73
GLUCOSE BLD-MCNC: 126 MG/DL (ref 74–99)
GLUCOSE URINE: NEGATIVE MG/DL
HBA1C MFR BLD: 8.2 % (ref 4–5.6)
HCT VFR BLD CALC: 45.1 % (ref 37–54)
HDLC SERPL-MCNC: 39 MG/DL
HEMOGLOBIN: 14 G/DL (ref 12.5–16.5)
IMMATURE GRANULOCYTES #: 0.02 E9/L
IMMATURE GRANULOCYTES %: 0.2 % (ref 0–5)
KETONES, URINE: NEGATIVE MG/DL
LDL CHOLESTEROL CALCULATED: 98 MG/DL (ref 0–99)
LEUKOCYTE ESTERASE, URINE: NEGATIVE
LYMPHOCYTES ABSOLUTE: 2.08 E9/L (ref 1.5–4)
LYMPHOCYTES RELATIVE PERCENT: 20.9 % (ref 20–42)
MCH RBC QN AUTO: 27.8 PG (ref 26–35)
MCHC RBC AUTO-ENTMCNC: 31 % (ref 32–34.5)
MCV RBC AUTO: 89.7 FL (ref 80–99.9)
MICROALBUMIN UR-MCNC: 280.5 MG/L
MONOCYTES ABSOLUTE: 1.3 E9/L (ref 0.1–0.95)
MONOCYTES RELATIVE PERCENT: 13.1 % (ref 2–12)
NEUTROPHILS ABSOLUTE: 5.25 E9/L (ref 1.8–7.3)
NEUTROPHILS RELATIVE PERCENT: 52.7 % (ref 43–80)
NITRITE, URINE: NEGATIVE
PDW BLD-RTO: 14.5 FL (ref 11.5–15)
PH UA: 6 (ref 5–9)
PLATELET # BLD: 55 E9/L (ref 130–450)
PLATELET CONFIRMATION: NORMAL
PMV BLD AUTO: 12.5 FL (ref 7–12)
POTASSIUM SERPL-SCNC: 5.3 MMOL/L (ref 3.5–5)
PROSTATE SPECIFIC ANTIGEN: 0.94 NG/ML (ref 0–4)
PROTEIN UA: 30 MG/DL
RBC # BLD: 5.03 E12/L (ref 3.8–5.8)
RBC UA: NORMAL /HPF (ref 0–2)
SODIUM BLD-SCNC: 139 MMOL/L (ref 132–146)
SPECIFIC GRAVITY UA: 1.02 (ref 1–1.03)
TOTAL PROTEIN: 7.7 G/DL (ref 6.4–8.3)
TRIGL SERPL-MCNC: 126 MG/DL (ref 0–149)
UROBILINOGEN, URINE: 0.2 E.U./DL
VITAMIN D 25-HYDROXY: 37 NG/ML (ref 30–100)
VLDLC SERPL CALC-MCNC: 25 MG/DL
WBC # BLD: 10 E9/L (ref 4.5–11.5)
WBC UA: NORMAL /HPF (ref 0–5)

## 2022-02-16 ENCOUNTER — OFFICE VISIT (OUTPATIENT)
Dept: PRIMARY CARE CLINIC | Age: 87
End: 2022-02-16
Payer: MEDICARE

## 2022-02-16 VITALS
TEMPERATURE: 97.2 F | BODY MASS INDEX: 26.33 KG/M2 | SYSTOLIC BLOOD PRESSURE: 138 MMHG | DIASTOLIC BLOOD PRESSURE: 82 MMHG | HEIGHT: 65 IN | WEIGHT: 158 LBS

## 2022-02-16 DIAGNOSIS — I10 ESSENTIAL HYPERTENSION: Chronic | ICD-10-CM

## 2022-02-16 DIAGNOSIS — D69.6 THROMBOCYTOPENIA (HCC): Primary | ICD-10-CM

## 2022-02-16 DIAGNOSIS — E11.65 TYPE 2 DIABETES MELLITUS WITH HYPERGLYCEMIA, WITH LONG-TERM CURRENT USE OF INSULIN (HCC): ICD-10-CM

## 2022-02-16 DIAGNOSIS — E55.9 VITAMIN D DEFICIENCY: ICD-10-CM

## 2022-02-16 DIAGNOSIS — Z79.4 TYPE 2 DIABETES MELLITUS WITH HYPERGLYCEMIA, WITH LONG-TERM CURRENT USE OF INSULIN (HCC): ICD-10-CM

## 2022-02-16 DIAGNOSIS — D69.6 THROMBOCYTOPENIA (HCC): ICD-10-CM

## 2022-02-16 DIAGNOSIS — E27.8 ADRENAL MASS (HCC): ICD-10-CM

## 2022-02-16 DIAGNOSIS — E78.2 MIXED HYPERLIPIDEMIA: Chronic | ICD-10-CM

## 2022-02-16 LAB
ANION GAP SERPL CALCULATED.3IONS-SCNC: 10 MMOL/L (ref 7–16)
BASOPHILS ABSOLUTE: 0.04 E9/L (ref 0–0.2)
BASOPHILS RELATIVE PERCENT: 0.4 % (ref 0–2)
BUN BLDV-MCNC: 19 MG/DL (ref 6–23)
CALCIUM SERPL-MCNC: 10 MG/DL (ref 8.6–10.2)
CHLORIDE BLD-SCNC: 101 MMOL/L (ref 98–107)
CO2: 28 MMOL/L (ref 22–29)
CREAT SERPL-MCNC: 1 MG/DL (ref 0.7–1.2)
EOSINOPHILS ABSOLUTE: 1.66 E9/L (ref 0.05–0.5)
EOSINOPHILS RELATIVE PERCENT: 17.8 % (ref 0–6)
GFR AFRICAN AMERICAN: >60
GFR NON-AFRICAN AMERICAN: >60 ML/MIN/1.73
GLUCOSE BLD-MCNC: 111 MG/DL (ref 74–99)
HBA1C MFR BLD: 8.5 % (ref 4–5.6)
HCT VFR BLD CALC: 45 % (ref 37–54)
HEMOGLOBIN: 14 G/DL (ref 12.5–16.5)
IMMATURE GRANULOCYTES #: 0.02 E9/L
IMMATURE GRANULOCYTES %: 0.2 % (ref 0–5)
LYMPHOCYTES ABSOLUTE: 1.8 E9/L (ref 1.5–4)
LYMPHOCYTES RELATIVE PERCENT: 19.3 % (ref 20–42)
MCH RBC QN AUTO: 27.9 PG (ref 26–35)
MCHC RBC AUTO-ENTMCNC: 31.1 % (ref 32–34.5)
MCV RBC AUTO: 89.6 FL (ref 80–99.9)
MONOCYTES ABSOLUTE: 1.27 E9/L (ref 0.1–0.95)
MONOCYTES RELATIVE PERCENT: 13.6 % (ref 2–12)
NEUTROPHILS ABSOLUTE: 4.55 E9/L (ref 1.8–7.3)
NEUTROPHILS RELATIVE PERCENT: 48.7 % (ref 43–80)
PDW BLD-RTO: 14.3 FL (ref 11.5–15)
PLATELET # BLD: 177 E9/L (ref 130–450)
PMV BLD AUTO: ABNORMAL FL (ref 7–12)
POTASSIUM SERPL-SCNC: 4.4 MMOL/L (ref 3.5–5)
RBC # BLD: 5.02 E12/L (ref 3.8–5.8)
SODIUM BLD-SCNC: 139 MMOL/L (ref 132–146)
VITAMIN D 25-HYDROXY: 33 NG/ML (ref 30–100)
WBC # BLD: 9.3 E9/L (ref 4.5–11.5)

## 2022-02-16 PROCEDURE — 3052F HG A1C>EQUAL 8.0%<EQUAL 9.0%: CPT | Performed by: FAMILY MEDICINE

## 2022-02-16 PROCEDURE — 99214 OFFICE O/P EST MOD 30 MIN: CPT | Performed by: FAMILY MEDICINE

## 2022-02-16 ASSESSMENT — PATIENT HEALTH QUESTIONNAIRE - PHQ9
1. LITTLE INTEREST OR PLEASURE IN DOING THINGS: 0
SUM OF ALL RESPONSES TO PHQ QUESTIONS 1-9: 0
SUM OF ALL RESPONSES TO PHQ QUESTIONS 1-9: 0
2. FEELING DOWN, DEPRESSED OR HOPELESS: 0
SUM OF ALL RESPONSES TO PHQ9 QUESTIONS 1 & 2: 0
SUM OF ALL RESPONSES TO PHQ QUESTIONS 1-9: 0
SUM OF ALL RESPONSES TO PHQ QUESTIONS 1-9: 0

## 2022-02-16 ASSESSMENT — ENCOUNTER SYMPTOMS
EYES NEGATIVE: 1
ALLERGIC/IMMUNOLOGIC NEGATIVE: 1
GASTROINTESTINAL NEGATIVE: 1
RESPIRATORY NEGATIVE: 1
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
## 2022-02-17 ENCOUNTER — TELEPHONE (OUTPATIENT)
Dept: PRIMARY CARE CLINIC | Age: 87
End: 2022-02-17

## 2022-02-17 NOTE — TELEPHONE ENCOUNTER
Tell patient his platelet count is now perfectly normal.  Most likely it was a little viral thing or just a fluke at the lab. Nothing to worry about.   Regular appointment

## 2022-03-29 ENCOUNTER — TELEPHONE (OUTPATIENT)
Dept: ENDOCRINOLOGY | Age: 87
End: 2022-03-29

## 2022-04-26 ENCOUNTER — OFFICE VISIT (OUTPATIENT)
Dept: ENDOCRINOLOGY | Age: 87
End: 2022-04-26
Payer: MEDICARE

## 2022-04-26 VITALS
HEART RATE: 70 BPM | BODY MASS INDEX: 26.99 KG/M2 | RESPIRATION RATE: 18 BRPM | HEIGHT: 65 IN | WEIGHT: 162 LBS | DIASTOLIC BLOOD PRESSURE: 73 MMHG | SYSTOLIC BLOOD PRESSURE: 173 MMHG | OXYGEN SATURATION: 98 %

## 2022-04-26 DIAGNOSIS — Z79.4 TYPE 2 DIABETES MELLITUS WITH HYPERGLYCEMIA, WITH LONG-TERM CURRENT USE OF INSULIN (HCC): Primary | ICD-10-CM

## 2022-04-26 DIAGNOSIS — E11.65 TYPE 2 DIABETES MELLITUS WITH HYPERGLYCEMIA, WITH LONG-TERM CURRENT USE OF INSULIN (HCC): Primary | ICD-10-CM

## 2022-04-26 DIAGNOSIS — E78.5 HYPERLIPIDEMIA, UNSPECIFIED HYPERLIPIDEMIA TYPE: ICD-10-CM

## 2022-04-26 DIAGNOSIS — E55.9 VITAMIN D DEFICIENCY: ICD-10-CM

## 2022-04-26 PROCEDURE — 99214 OFFICE O/P EST MOD 30 MIN: CPT | Performed by: INTERNAL MEDICINE

## 2022-04-26 PROCEDURE — 3052F HG A1C>EQUAL 8.0%<EQUAL 9.0%: CPT | Performed by: INTERNAL MEDICINE

## 2022-04-26 RX ORDER — PEN NEEDLE, DIABETIC 32 GX 1/4"
NEEDLE, DISPOSABLE MISCELLANEOUS
Qty: 250 EACH | Refills: 5 | Status: SHIPPED | OUTPATIENT
Start: 2022-04-26

## 2022-04-26 RX ORDER — INSULIN ASPART 100 [IU]/ML
16 INJECTION, SOLUTION INTRAVENOUS; SUBCUTANEOUS
Qty: 20 PEN | Refills: 3 | Status: SHIPPED | OUTPATIENT
Start: 2022-04-26

## 2022-04-26 RX ORDER — INSULIN DETEMIR 100 [IU]/ML
30 INJECTION, SOLUTION SUBCUTANEOUS NIGHTLY
Qty: 15 PEN | Refills: 3 | Status: SHIPPED | OUTPATIENT
Start: 2022-04-26
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
## 2022-06-06 DIAGNOSIS — K59.09 OTHER CONSTIPATION: ICD-10-CM

## 2022-06-07 ENCOUNTER — TELEPHONE (OUTPATIENT)
Dept: PRIMARY CARE CLINIC | Age: 87
End: 2022-06-07

## 2022-06-07 DIAGNOSIS — U07.1 COVID-19: Primary | ICD-10-CM

## 2022-06-07 RX ORDER — AZITHROMYCIN 250 MG/1
250 TABLET, FILM COATED ORAL SEE ADMIN INSTRUCTIONS
Qty: 6 TABLET | Refills: 0 | Status: SHIPPED | OUTPATIENT
Start: 2022-06-07 | End: 2022-06-12

## 2022-06-07 NOTE — TELEPHONE ENCOUNTER
Notify them that I sent a Z-Adam to the drugstore. He should check his oxygen level with a finger oxygen meter several times a day on ambulation and make sure his oxygen level stays above 90%. If it goes below 90% stays very need to go to ER. Increase fluids. Take zinc and vitamin C over-the-counter.   Call if symptoms worsen or do not get better soon

## 2022-06-07 NOTE — TELEPHONE ENCOUNTER
The pt's daughter is calling because the pt was diagnosed with covid a couple of days ago and he is in a lot of pain, \"someone was telling her about a medication of 5 pills maybe that he can take\" ? ?? she wasn't really sure but was asking if something preventative could be sent to [facility] on [address]

## 2022-06-15 ENCOUNTER — OFFICE VISIT (OUTPATIENT)
Dept: PRIMARY CARE CLINIC | Age: 87
End: 2022-06-15
Payer: MEDICARE

## 2022-06-15 VITALS
HEIGHT: 65 IN | BODY MASS INDEX: 26.66 KG/M2 | OXYGEN SATURATION: 98 % | TEMPERATURE: 97.9 F | DIASTOLIC BLOOD PRESSURE: 83 MMHG | HEART RATE: 62 BPM | WEIGHT: 160 LBS | SYSTOLIC BLOOD PRESSURE: 138 MMHG

## 2022-06-15 DIAGNOSIS — E10.65 UNCONTROLLED TYPE 1 DIABETES MELLITUS WITH HYPERGLYCEMIA (HCC): ICD-10-CM

## 2022-06-15 DIAGNOSIS — E78.2 MIXED HYPERLIPIDEMIA: Chronic | ICD-10-CM

## 2022-06-15 DIAGNOSIS — M81.0 AGE-RELATED OSTEOPOROSIS WITHOUT CURRENT PATHOLOGICAL FRACTURE: ICD-10-CM

## 2022-06-15 DIAGNOSIS — I10 ESSENTIAL HYPERTENSION: Chronic | ICD-10-CM

## 2022-06-15 DIAGNOSIS — I25.10 CORONARY ARTERY DISEASE INVOLVING NATIVE CORONARY ARTERY OF NATIVE HEART WITHOUT ANGINA PECTORIS: Primary | Chronic | ICD-10-CM

## 2022-06-15 PROCEDURE — 1123F ACP DISCUSS/DSCN MKR DOCD: CPT | Performed by: FAMILY MEDICINE

## 2022-06-15 PROCEDURE — 3052F HG A1C>EQUAL 8.0%<EQUAL 9.0%: CPT | Performed by: FAMILY MEDICINE

## 2022-06-15 PROCEDURE — 99214 OFFICE O/P EST MOD 30 MIN: CPT | Performed by: FAMILY MEDICINE

## 2022-06-15 RX ORDER — GLUCOSAMINE HCL/CHONDROITIN SU 500-400 MG
CAPSULE ORAL
Qty: 100 STRIP | Refills: 12 | Status: SHIPPED | OUTPATIENT
Start: 2022-06-15

## 2022-06-15 ASSESSMENT — PATIENT HEALTH QUESTIONNAIRE - PHQ9
2. FEELING DOWN, DEPRESSED OR HOPELESS: 0
SUM OF ALL RESPONSES TO PHQ QUESTIONS 1-9: 0
1. LITTLE INTEREST OR PLEASURE IN DOING THINGS: 0
SUM OF ALL RESPONSES TO PHQ QUESTIONS 1-9: 0
SUM OF ALL RESPONSES TO PHQ9 QUESTIONS 1 & 2: 0
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
## 2022-07-01 ENCOUNTER — APPOINTMENT (OUTPATIENT)
Dept: GENERAL RADIOLOGY | Age: 87
End: 2022-07-01
Payer: MEDICARE

## 2022-07-01 ENCOUNTER — APPOINTMENT (OUTPATIENT)
Dept: ULTRASOUND IMAGING | Age: 87
End: 2022-07-01
Payer: MEDICARE

## 2022-07-01 ENCOUNTER — APPOINTMENT (OUTPATIENT)
Dept: CT IMAGING | Age: 87
End: 2022-07-01
Payer: MEDICARE

## 2022-07-01 ENCOUNTER — HOSPITAL ENCOUNTER (OUTPATIENT)
Age: 87
Setting detail: OBSERVATION
Discharge: HOME | End: 2022-07-03
Attending: EMERGENCY MEDICINE | Admitting: INTERNAL MEDICINE
Payer: MEDICARE

## 2022-07-01 ENCOUNTER — TELEPHONE (OUTPATIENT)
Dept: PRIMARY CARE CLINIC | Age: 87
End: 2022-07-01

## 2022-07-01 DIAGNOSIS — R07.9 CHEST PAIN, UNSPECIFIED TYPE: Primary | ICD-10-CM

## 2022-07-01 LAB
ALBUMIN SERPL-MCNC: 4.3 G/DL (ref 3.5–5.2)
ALP BLD-CCNC: 72 U/L (ref 40–129)
ALT SERPL-CCNC: 21 U/L (ref 0–40)
ANION GAP SERPL CALCULATED.3IONS-SCNC: 17 MMOL/L (ref 7–16)
AST SERPL-CCNC: 22 U/L (ref 0–39)
BASOPHILS ABSOLUTE: 0.03 E9/L (ref 0–0.2)
BASOPHILS RELATIVE PERCENT: 0.4 % (ref 0–2)
BILIRUB SERPL-MCNC: 0.6 MG/DL (ref 0–1.2)
BUN BLDV-MCNC: 20 MG/DL (ref 6–23)
CALCIUM SERPL-MCNC: 9.6 MG/DL (ref 8.6–10.2)
CHLORIDE BLD-SCNC: 100 MMOL/L (ref 98–107)
CO2: 19 MMOL/L (ref 22–29)
CREAT SERPL-MCNC: 0.9 MG/DL (ref 0.7–1.2)
EKG ATRIAL RATE: 71 BPM
EKG P AXIS: 71 DEGREES
EKG P-R INTERVAL: 148 MS
EKG Q-T INTERVAL: 454 MS
EKG QRS DURATION: 126 MS
EKG QTC CALCULATION (BAZETT): 493 MS
EKG R AXIS: 38 DEGREES
EKG T AXIS: 79 DEGREES
EKG VENTRICULAR RATE: 71 BPM
EOSINOPHILS ABSOLUTE: 0.77 E9/L (ref 0.05–0.5)
EOSINOPHILS RELATIVE PERCENT: 9.7 % (ref 0–6)
GFR AFRICAN AMERICAN: >60
GFR NON-AFRICAN AMERICAN: >60 ML/MIN/1.73
GLUCOSE BLD-MCNC: 190 MG/DL (ref 74–99)
HCT VFR BLD CALC: 41.7 % (ref 37–54)
HEMOGLOBIN: 13.7 G/DL (ref 12.5–16.5)
IMMATURE GRANULOCYTES #: 0.02 E9/L
IMMATURE GRANULOCYTES %: 0.3 % (ref 0–5)
LACTIC ACID, SEPSIS: 1.9 MMOL/L (ref 0.5–1.9)
LYMPHOCYTES ABSOLUTE: 1.47 E9/L (ref 1.5–4)
LYMPHOCYTES RELATIVE PERCENT: 18.4 % (ref 20–42)
MCH RBC QN AUTO: 28 PG (ref 26–35)
MCHC RBC AUTO-ENTMCNC: 32.9 % (ref 32–34.5)
MCV RBC AUTO: 85.3 FL (ref 80–99.9)
METER GLUCOSE: 147 MG/DL (ref 74–99)
METER GLUCOSE: 169 MG/DL (ref 74–99)
METER GLUCOSE: 176 MG/DL (ref 74–99)
MONOCYTES ABSOLUTE: 0.77 E9/L (ref 0.1–0.95)
MONOCYTES RELATIVE PERCENT: 9.7 % (ref 2–12)
NEUTROPHILS ABSOLUTE: 4.91 E9/L (ref 1.8–7.3)
NEUTROPHILS RELATIVE PERCENT: 61.5 % (ref 43–80)
PDW BLD-RTO: 14.2 FL (ref 11.5–15)
PLATELET # BLD: 196 E9/L (ref 130–450)
PMV BLD AUTO: 13.6 FL (ref 7–12)
POTASSIUM REFLEX MAGNESIUM: 4.2 MMOL/L (ref 3.5–5)
RBC # BLD: 4.89 E12/L (ref 3.8–5.8)
SODIUM BLD-SCNC: 136 MMOL/L (ref 132–146)
TOTAL PROTEIN: 8.3 G/DL (ref 6.4–8.3)
TROPONIN, HIGH SENSITIVITY: 8 NG/L (ref 0–11)
TROPONIN, HIGH SENSITIVITY: 8 NG/L (ref 0–11)
WBC # BLD: 8 E9/L (ref 4.5–11.5)

## 2022-07-01 PROCEDURE — 96375 TX/PRO/DX INJ NEW DRUG ADDON: CPT

## 2022-07-01 PROCEDURE — 76770 US EXAM ABDO BACK WALL COMP: CPT

## 2022-07-01 PROCEDURE — 84484 ASSAY OF TROPONIN QUANT: CPT

## 2022-07-01 PROCEDURE — 71045 X-RAY EXAM CHEST 1 VIEW: CPT

## 2022-07-01 PROCEDURE — 93005 ELECTROCARDIOGRAM TRACING: CPT | Performed by: EMERGENCY MEDICINE

## 2022-07-01 PROCEDURE — 96374 THER/PROPH/DIAG INJ IV PUSH: CPT

## 2022-07-01 PROCEDURE — 74174 CTA ABD&PLVS W/CONTRAST: CPT

## 2022-07-01 PROCEDURE — G0378 HOSPITAL OBSERVATION PER HR: HCPCS

## 2022-07-01 PROCEDURE — 83605 ASSAY OF LACTIC ACID: CPT

## 2022-07-01 PROCEDURE — 6370000000 HC RX 637 (ALT 250 FOR IP): Performed by: INTERNAL MEDICINE

## 2022-07-01 PROCEDURE — 2580000003 HC RX 258: Performed by: EMERGENCY MEDICINE

## 2022-07-01 PROCEDURE — 6360000002 HC RX W HCPCS: Performed by: EMERGENCY MEDICINE

## 2022-07-01 PROCEDURE — 82962 GLUCOSE BLOOD TEST: CPT

## 2022-07-01 PROCEDURE — 85025 COMPLETE CBC W/AUTO DIFF WBC: CPT

## 2022-07-01 PROCEDURE — 96361 HYDRATE IV INFUSION ADD-ON: CPT

## 2022-07-01 PROCEDURE — 80053 COMPREHEN METABOLIC PANEL: CPT

## 2022-07-01 PROCEDURE — 2580000003 HC RX 258: Performed by: INTERNAL MEDICINE

## 2022-07-01 PROCEDURE — 71275 CT ANGIOGRAPHY CHEST: CPT

## 2022-07-01 PROCEDURE — 96372 THER/PROPH/DIAG INJ SC/IM: CPT

## 2022-07-01 PROCEDURE — 6370000000 HC RX 637 (ALT 250 FOR IP): Performed by: EMERGENCY MEDICINE

## 2022-07-01 PROCEDURE — 6360000004 HC RX CONTRAST MEDICATION: Performed by: RADIOLOGY

## 2022-07-01 PROCEDURE — S5553 INSULIN LONG ACTING 5 U: HCPCS | Performed by: INTERNAL MEDICINE

## 2022-07-01 PROCEDURE — 99285 EMERGENCY DEPT VISIT HI MDM: CPT

## 2022-07-01 RX ORDER — POLYETHYLENE GLYCOL 3350 17 G/17G
17 POWDER, FOR SOLUTION ORAL DAILY PRN
Status: DISCONTINUED | OUTPATIENT
Start: 2022-07-01 | End: 2022-07-03 | Stop reason: HOSPADM

## 2022-07-01 RX ORDER — LABETALOL HYDROCHLORIDE 5 MG/ML
20 INJECTION, SOLUTION INTRAVENOUS ONCE
Status: DISCONTINUED | OUTPATIENT
Start: 2022-07-01 | End: 2022-07-03 | Stop reason: HOSPADM

## 2022-07-01 RX ORDER — SODIUM CHLORIDE 9 MG/ML
INJECTION, SOLUTION INTRAVENOUS PRN
Status: DISCONTINUED | OUTPATIENT
Start: 2022-07-01 | End: 2022-07-03 | Stop reason: HOSPADM

## 2022-07-01 RX ORDER — ACETAMINOPHEN 325 MG/1
650 TABLET ORAL EVERY 6 HOURS PRN
Status: DISCONTINUED | OUTPATIENT
Start: 2022-07-01 | End: 2022-07-03 | Stop reason: HOSPADM

## 2022-07-01 RX ORDER — ONDANSETRON 4 MG/1
4 TABLET, FILM COATED ORAL 3 TIMES DAILY PRN
Qty: 21 TABLET | Refills: 0 | Status: SHIPPED | OUTPATIENT
Start: 2022-07-01

## 2022-07-01 RX ORDER — CARVEDILOL 6.25 MG/1
6.25 TABLET ORAL 2 TIMES DAILY
Status: DISCONTINUED | OUTPATIENT
Start: 2022-07-01 | End: 2022-07-02

## 2022-07-01 RX ORDER — LATANOPROST 50 UG/ML
1 SOLUTION/ DROPS OPHTHALMIC NIGHTLY
Status: DISCONTINUED | OUTPATIENT
Start: 2022-07-01 | End: 2022-07-03 | Stop reason: HOSPADM

## 2022-07-01 RX ORDER — ENOXAPARIN SODIUM 100 MG/ML
40 INJECTION SUBCUTANEOUS DAILY
Status: DISCONTINUED | OUTPATIENT
Start: 2022-07-01 | End: 2022-07-03 | Stop reason: HOSPADM

## 2022-07-01 RX ORDER — ACETAMINOPHEN 650 MG/1
650 SUPPOSITORY RECTAL EVERY 6 HOURS PRN
Status: DISCONTINUED | OUTPATIENT
Start: 2022-07-01 | End: 2022-07-03 | Stop reason: HOSPADM

## 2022-07-01 RX ORDER — ASPIRIN 325 MG
325 TABLET ORAL ONCE
Status: COMPLETED | OUTPATIENT
Start: 2022-07-01 | End: 2022-07-01

## 2022-07-01 RX ORDER — INSULIN LISPRO 100 [IU]/ML
0-12 INJECTION, SOLUTION INTRAVENOUS; SUBCUTANEOUS
Status: DISCONTINUED | OUTPATIENT
Start: 2022-07-01 | End: 2022-07-03 | Stop reason: HOSPADM

## 2022-07-01 RX ORDER — 0.9 % SODIUM CHLORIDE 0.9 %
1000 INTRAVENOUS SOLUTION INTRAVENOUS ONCE
Status: COMPLETED | OUTPATIENT
Start: 2022-07-01 | End: 2022-07-01

## 2022-07-01 RX ORDER — ONDANSETRON 4 MG/1
4 TABLET, ORALLY DISINTEGRATING ORAL EVERY 8 HOURS PRN
Status: DISCONTINUED | OUTPATIENT
Start: 2022-07-01 | End: 2022-07-01

## 2022-07-01 RX ORDER — SODIUM CHLORIDE 0.9 % (FLUSH) 0.9 %
10 SYRINGE (ML) INJECTION
Status: ACTIVE | OUTPATIENT
Start: 2022-07-01 | End: 2022-07-01

## 2022-07-01 RX ORDER — INSULIN LISPRO 100 [IU]/ML
0-6 INJECTION, SOLUTION INTRAVENOUS; SUBCUTANEOUS NIGHTLY
Status: DISCONTINUED | OUTPATIENT
Start: 2022-07-01 | End: 2022-07-03 | Stop reason: HOSPADM

## 2022-07-01 RX ORDER — PRAVASTATIN SODIUM 20 MG
20 TABLET ORAL DAILY
Status: DISCONTINUED | OUTPATIENT
Start: 2022-07-01 | End: 2022-07-03 | Stop reason: HOSPADM

## 2022-07-01 RX ORDER — MORPHINE SULFATE 4 MG/ML
4 INJECTION, SOLUTION INTRAMUSCULAR; INTRAVENOUS ONCE
Status: COMPLETED | OUTPATIENT
Start: 2022-07-01 | End: 2022-07-01

## 2022-07-01 RX ORDER — LOSARTAN POTASSIUM 50 MG/1
100 TABLET ORAL DAILY
Status: DISCONTINUED | OUTPATIENT
Start: 2022-07-01 | End: 2022-07-03 | Stop reason: HOSPADM

## 2022-07-01 RX ORDER — ONDANSETRON 2 MG/ML
4 INJECTION INTRAMUSCULAR; INTRAVENOUS EVERY 6 HOURS PRN
Status: DISCONTINUED | OUTPATIENT
Start: 2022-07-01 | End: 2022-07-01

## 2022-07-01 RX ORDER — METOCLOPRAMIDE HYDROCHLORIDE 5 MG/ML
10 INJECTION INTRAMUSCULAR; INTRAVENOUS ONCE
Status: COMPLETED | OUTPATIENT
Start: 2022-07-01 | End: 2022-07-01

## 2022-07-01 RX ORDER — INSULIN LISPRO 100 [IU]/ML
16 INJECTION, SOLUTION INTRAVENOUS; SUBCUTANEOUS
Status: DISCONTINUED | OUTPATIENT
Start: 2022-07-01 | End: 2022-07-03 | Stop reason: HOSPADM

## 2022-07-01 RX ORDER — DIPHENHYDRAMINE HYDROCHLORIDE 50 MG/ML
12.5 INJECTION INTRAMUSCULAR; INTRAVENOUS ONCE
Status: COMPLETED | OUTPATIENT
Start: 2022-07-01 | End: 2022-07-01

## 2022-07-01 RX ORDER — SODIUM CHLORIDE 0.9 % (FLUSH) 0.9 %
5-40 SYRINGE (ML) INJECTION PRN
Status: DISCONTINUED | OUTPATIENT
Start: 2022-07-01 | End: 2022-07-03 | Stop reason: HOSPADM

## 2022-07-01 RX ORDER — SODIUM CHLORIDE 0.9 % (FLUSH) 0.9 %
5-40 SYRINGE (ML) INJECTION EVERY 12 HOURS SCHEDULED
Status: DISCONTINUED | OUTPATIENT
Start: 2022-07-01 | End: 2022-07-03 | Stop reason: HOSPADM

## 2022-07-01 RX ORDER — DEXTROSE MONOHYDRATE 50 MG/ML
100 INJECTION, SOLUTION INTRAVENOUS PRN
Status: DISCONTINUED | OUTPATIENT
Start: 2022-07-01 | End: 2022-07-03 | Stop reason: HOSPADM

## 2022-07-01 RX ORDER — INSULIN GLARGINE-YFGN 100 [IU]/ML
30 INJECTION, SOLUTION SUBCUTANEOUS NIGHTLY
Status: DISCONTINUED | OUTPATIENT
Start: 2022-07-01 | End: 2022-07-03 | Stop reason: HOSPADM

## 2022-07-01 RX ADMIN — IOPAMIDOL 75 ML: 755 INJECTION, SOLUTION INTRAVENOUS at 12:42

## 2022-07-01 RX ADMIN — ASPIRIN 325 MG: 325 TABLET ORAL at 16:24

## 2022-07-01 RX ADMIN — INSULIN LISPRO 2 UNITS: 100 INJECTION, SOLUTION INTRAVENOUS; SUBCUTANEOUS at 18:25

## 2022-07-01 RX ADMIN — SODIUM CHLORIDE, PRESERVATIVE FREE 10 ML: 5 INJECTION INTRAVENOUS at 21:20

## 2022-07-01 RX ADMIN — PRAVASTATIN SODIUM 20 MG: 20 TABLET ORAL at 18:25

## 2022-07-01 RX ADMIN — LATANOPROST 1 DROP: 50 SOLUTION OPHTHALMIC at 21:20

## 2022-07-01 RX ADMIN — LOSARTAN POTASSIUM 100 MG: 50 TABLET, FILM COATED ORAL at 18:25

## 2022-07-01 RX ADMIN — MORPHINE SULFATE 4 MG: 4 INJECTION, SOLUTION INTRAMUSCULAR; INTRAVENOUS at 12:09

## 2022-07-01 RX ADMIN — INSULIN GLARGINE-YFGN 30 UNITS: 100 INJECTION, SOLUTION SUBCUTANEOUS at 21:18

## 2022-07-01 RX ADMIN — CARVEDILOL 6.25 MG: 6.25 TABLET, FILM COATED ORAL at 21:19

## 2022-07-01 RX ADMIN — DIPHENHYDRAMINE HYDROCHLORIDE 12.5 MG: 50 INJECTION, SOLUTION INTRAMUSCULAR; INTRAVENOUS at 14:16

## 2022-07-01 RX ADMIN — METOCLOPRAMIDE HYDROCHLORIDE 10 MG: 5 INJECTION INTRAMUSCULAR; INTRAVENOUS at 14:16

## 2022-07-01 RX ADMIN — TRIMETHOBENZAMIDE HYDROCHLORIDE 200 MG: 100 INJECTION INTRAMUSCULAR at 12:09

## 2022-07-01 RX ADMIN — SODIUM CHLORIDE 1000 ML: 9 INJECTION, SOLUTION INTRAVENOUS at 12:10

## 2022-07-01 ASSESSMENT — PAIN SCALES - GENERAL
PAINLEVEL_OUTOF10: 9
PAINLEVEL_OUTOF10: 4

## 2022-07-01 ASSESSMENT — PAIN - FUNCTIONAL ASSESSMENT: PAIN_FUNCTIONAL_ASSESSMENT: 0-10

## 2022-07-01 ASSESSMENT — PAIN DESCRIPTION - LOCATION
LOCATION: ABDOMEN
LOCATION: CHEST
LOCATION: ABDOMEN

## 2022-07-01 ASSESSMENT — PAIN DESCRIPTION - ORIENTATION: ORIENTATION: RIGHT;LOWER;LEFT

## 2022-07-01 NOTE — TELEPHONE ENCOUNTER
Spoke with pt daughter. They are currently in ER with pt due to severe abdominal pain.  His bp was 177/70 and temp 80.2

## 2022-07-01 NOTE — TELEPHONE ENCOUNTER
Daughter calling state he is nauseous and vomiting since 9 am today. Wanting to know if he can have Zofran.

## 2022-07-01 NOTE — ED PROVIDER NOTES
Department of Emergency Medicine   ED  Provider Note  Admit Date/RoomTime: 7/1/2022 11:50 AM  ED Room: 08/08          History of Present Illness:  7/1/22, Time: 12:05 PM EDT  Chief Complaint   Patient presents with    Chest Pain     at 9am, then RUQ pain at 10am. Then left leg tingling starting at 1130am.                 Willa Villafuerte is a 80 y.o. male presenting to the ED for chest pain, beginning 9am today. The complaint has been constant, moderate in severity, and worsened by nothing. Patient reports that around 9 AM he had sudden onset of lower chest pain. He reports the pain is in his chest that radiates towards his abdomen. He says he felt sick to his stomach and had tingling in his leg as well. He is sweaty and diaphoretic. He reports history of heart attack in the past as well. He reports the pain is more of a burning heaviness/pressure as well. He denies fever or chills. He denies back pain or syncope. He denies any trauma. No diarrhea. No vomiting blood. He had some nonbloody, nonbilious emesis as well. Review of Systems:   A complete review of systems was performed and pertinent positives and negatives are stated within HPI, all other systems reviewed and are negative.        --------------------------------------------- PAST HISTORY ---------------------------------------------  Past Medical History:  has a past medical history of CAD (coronary artery disease), Chronic pain syndrome, Constipation, Degenerative joint disease involving multiple joints, Diabetes ([address] Diabetic neuropathy ([facility] [address] Glaucoma, Hyperlipidemia, Hypertension, Insomnia, Multiple vessel coronary artery disease, Osteoarthritis, Paresthesia of right leg, Peptic reflux disease, Raised prostate specific antigen, and Type 2 diabetes mellitus without complication ([facility] [address]     Past Surgical History:  has a past surgical history that includes Facial cosmetic surgery and Cataract removal.    Social History:  reports that he has quit smoking. His smoking use included cigarettes. He has never used smokeless tobacco. He reports that he does not drink alcohol and does not use drugs. Family History: family history includes Cancer in his mother and sister; Heart Attack in his mother; Heart Disease in his mother; Kidney Disease in his brother; Other in his father. . Unless otherwise noted, family history is non contributory    The patients home medications have been reviewed. Allergies: No known allergies    I have reviewed the past medical history, past surgical history, social history, and family history    ---------------------------------------------------PHYSICAL EXAM--------------------------------------    Constitutional/General: Alert and oriented x3  Head: Normocephalic and atraumatic  Eyes: PERRL, EOMI, sclera non icteric  ENT: Oropharynx clear, handling secretions  Neck: Supple, full ROM, no stridor, no meningeal signs  Respiratory: Lungs clear to auscultation bilaterally, no wheezes, rales, or rhonchi. Not in respiratory distress  Cardiovascular:  Regular rate. Regular rhythm. No murmurs, no gallops, no rubs. 2+ distal pulses. Equal extremity pulses. Gastrointestinal:  Abdomen Soft, Non tender, Non distended. No rebound, guarding, or rigidity. No pulsatile masses. Musculoskeletal: Moves all extremities x 4. Warm and well perfused, no clubbing, no cyanosis, no edema. Capillary refill <3 seconds  Skin: skin warm and dry. No rashes. Neurologic: GCS 15, no focal deficits, symmetric strength 5/5 in the upper and lower extremities bilaterally  Psychiatric: Normal Affect          -------------------------------------------------- RESULTS -------------------------------------------------  I have personally reviewed all laboratory and imaging results for this patient. Results are listed below.      LABS: (Lab results interpreted by me)  Results for orders placed or performed during the hospital encounter of 07/01/22   CBC with Auto Differential   Result Value Ref Range    WBC 8.0 4.5 - 11.5 E9/L    RBC 4.89 3.80 - 5.80 E12/L    Hemoglobin 13.7 12.5 - 16.5 g/dL    Hematocrit 41.7 37.0 - 54.0 %    MCV 85.3 80.0 - 99.9 fL    MCH 28.0 26.0 - 35.0 pg    MCHC 32.9 32.0 - 34.5 %    RDW 14.2 11.5 - 15.0 fL    Platelets 439 963 - 216 E9/L    MPV 13.6 (H) 7.0 - 12.0 fL    Neutrophils % 61.5 43.0 - 80.0 %    Immature Granulocytes % 0.3 0.0 - 5.0 %    Lymphocytes % 18.4 (L) 20.0 - 42.0 %    Monocytes % 9.7 2.0 - 12.0 %    Eosinophils % 9.7 (H) 0.0 - 6.0 %    Basophils % 0.4 0.0 - 2.0 %    Neutrophils Absolute 4.91 1.80 - 7.30 E9/L    Immature Granulocytes # 0.02 E9/L    Lymphocytes Absolute 1.47 (L) 1.50 - 4.00 E9/L    Monocytes Absolute 0.77 0.10 - 0.95 E9/L    Eosinophils Absolute 0.77 (H) 0.05 - 0.50 E9/L    Basophils Absolute 0.03 0.00 - 0.20 E9/L   Comprehensive Metabolic Panel w/ Reflex to MG   Result Value Ref Range    Sodium 136 132 - 146 mmol/L    Potassium reflex Magnesium 4.2 3.5 - 5.0 mmol/L    Chloride 100 98 - 107 mmol/L    CO2 19 (L) 22 - 29 mmol/L    Anion Gap 17 (H) 7 - 16 mmol/L    Glucose 190 (H) 74 - 99 mg/dL    BUN 20 6 - 23 mg/dL    CREATININE 0.9 0.7 - 1.2 mg/dL    GFR Non-African American >60 >=60 mL/min/1.73    GFR African American >60     Calcium 9.6 8.6 - 10.2 mg/dL    Total Protein 8.3 6.4 - 8.3 g/dL    Albumin 4.3 3.5 - 5.2 g/dL    Total Bilirubin 0.6 0.0 - 1.2 mg/dL    Alkaline Phosphatase 72 40 - 129 U/L    ALT 21 0 - 40 U/L    AST 22 0 - 39 U/L   Troponin   Result Value Ref Range    Troponin, High Sensitivity 8 0 - 11 ng/L   Lactate, Sepsis   Result Value Ref Range    Lactic Acid, Sepsis 1.9 0.5 - 1.9 mmol/L   Troponin   Result Value Ref Range    Troponin, High Sensitivity 8 0 - 11 ng/L   POCT Glucose   Result Value Ref Range    Meter Glucose 176 (H) 74 - 99 mg/dL   EKG 12 Lead   Result Value Ref Range    Ventricular Rate 71 BPM    Atrial Rate 71 BPM    P-R Interval 148 ms    QRS Duration 126 ms    Q-T Interval 454 ms QTc Calculation (Bazett) 493 ms    P Axis 71 degrees    R Axis 38 degrees    T Axis 79 degrees   EKG 12 Lead   Result Value Ref Range    Ventricular Rate 76 BPM    Atrial Rate 76 BPM    P-R Interval 152 ms    QRS Duration 144 ms    Q-T Interval 456 ms    QTc Calculation (Bazett) 513 ms    P Axis 50 degrees    R Axis 29 degrees    T Axis 68 degrees   ,       RADIOLOGY:  Interpreted by Radiologist unless otherwise specified  CTA CHEST W CONTRAST   Final Result   1. No evidence of thoracic aortic aneurysm or dissection. 2.  Enlarged mediastinal lymph nodes and mildly enlarged right hilar lymph   node, as described above. These could be reactive. Neoplastic lymph nodes   cannot be entirely excluded. If clinically warranted, PET/CT can be   considered. 3.  Multiple areas of tree-in-bud opacities involving the lungs bilaterally   most prominently in the upper lobes, right greater than left. There is seen   to a more limited degree in the right middle lobe, lingula, and lower lobes. Multiple, subcentimeter centrilobular nodules are also identified in the same   distribution. The findings are nonspecific and may represent   infective/inflammatory bronchiolitis. Given the lymph nodes in the   mediastinum and right hilum, malignancy cannot be excluded. Pulmonary   consultation is recommended. 4.  Pulmonary parenchymal scar formation in the left upper lobe. 5.  Larger pulmonary nodules seen bilaterally, as described above. Again,   this could represent inflammatory nodules. Neoplastic disease cannot be   excluded. CTA ABDOMEN PELVIS W CONTRAST   Final Result   1. No evidence of abdominal aortic aneurysm or dissection. 2.  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       3.  Again suggestion of gallbladder adenomyomatosis, when compared to the   previous CT study performed 12/09/2021.      4.  Mild interval enlargement of a probable complex left renal cortical cyst. Given its mild interval increase in size, renal ultrasound is recommended. 5.  2 focal areas of luminal narrowing of the colon, as described above. This probably represents phasicity/peristalsis. Colonic lesions cannot be   entirely excluded. Clinical correlation is recommended. 6.  Enlarged prostate gland again noted. 7.  Ovoid radiolucency in the left iliac bone, which may represent a bone   cyst.  Other lytic entities cannot be excluded. Radionuclide medicine bone   scan can be performed for further evaluation. 8.  Other findings, as described         XR CHEST PORTABLE   Final Result   Increased perihilar lung markings concerning for central vascular congestion   versus peribronchial inflammatory process similar to prior. No new   consolidation or pleural effusion.          US RETROPERITONEAL LIMITED    (Results Pending)         EKG Interpretation  Interpreted by emergency department physician, Dr. Jose Vera     Date of EK22  Time: 1156    Rhythm: normal sinus   Rate: normal  Axis: normal  Conduction: left bundle branch block (complete)  ST Segments: no acute change  T Waves: no acute change    Clinical Impression: Sinus rhythm, no acute ischemic changes    Comparison to prior EKG: stable as compared to patient's most recent EKG      EKG Interpretation  Interpreted by emergency department physician, Dr. Jose Vera     Date of EK22  Time: 1423    Rhythm: normal sinus   Rate: normal  Axis: normal  Conduction: left bundle branch block (complete)  ST Segments: no acute change  T Waves: no acute change    Clinical Impression: Sinus rhythm, no acute ischemic changes    Comparison to prior EKG: stable as compared to patient's most recent EKG    ------------------------- NURSING NOTES AND VITALS REVIEWED ---------------------------   The nursing notes within the ED encounter and vital signs as below have been reviewed by myself  BP (!) 194/75   Pulse 83   Temp 97.8 °F (36.6 °C)   Resp 22   Wt 160 lb (72.6 kg)   SpO2 99%   BMI 26.63 kg/m²     Oxygen Saturation Interpretation: Normal    The patients available past medical records and past encounters were reviewed. ------------------------------ ED COURSE/MEDICAL DECISION MAKING----------------------  Medications   sodium chloride flush 0.9 % injection 10 mL (has no administration in time range)   labetalol (NORMODYNE;TRANDATE) injection 20 mg (has no administration in time range)   aspirin tablet 325 mg (has no administration in time range)   0.9 % sodium chloride bolus (0 mLs IntraVENous Stopped 7/1/22 1310)   morphine sulfate (PF) injection 4 mg (4 mg IntraVENous Given 7/1/22 1209)   trimethobenzamide (TIGAN) injection 200 mg (200 mg IntraMUSCular Given 7/1/22 1209)   iopamidol (ISOVUE-370) 76 % injection 75 mL (75 mLs IntraVENous Given 7/1/22 1242)   metoclopramide (REGLAN) injection 10 mg (10 mg IntraVENous Given 7/1/22 1416)   diphenhydrAMINE (BENADRYL) injection 12.5 mg (12.5 mg IntraVENous Given 7/1/22 1416)           The cardiac monitor revealed sinus rhythm with a heart rate in the 80s as interpreted by me. The cardiac monitor was ordered secondary to the patient's chest pain and to monitor the patient for dysrhythmia. CPT [id number]       IDr. Britany, am the primary provider of record    Medical Decision Making:   Chest pain, nausea, diaphoresis, improving with meds. CTA negative for dissection  Trop with no change  EKG unchanged, no ST elevation  Medicine consulted for admission    Oxygen Saturation Interpretation: 99 % on RA.          Re-Evaluations:       improving      This patient's ED course included: a personal history and physicial examination, re-evaluation prior to disposition, multiple bedside re-evaluations, IV medications, cardiac monitoring, continuous pulse oximetry and complex medical decision making and emergency management    This patient has remained hemodynamically stable during their ED course. Consultations:  Dr. Dwayne Kirk    Counseling: The emergency provider has spoken with the patient and family and discussed todays results, in addition to providing specific details for the plan of care and counseling regarding the diagnosis and prognosis. Questions are answered at this time and they are agreeable with the plan.       --------------------------------- IMPRESSION AND DISPOSITION ---------------------------------    IMPRESSION  1. Chest pain, unspecified type        DISPOSITION  Disposition: Admit to telemetry  Patient condition is stable        NOTE: This report was transcribed using voice recognition software.  Every effort was made to ensure accuracy; however, inadvertent computerized transcription errors may be present        Rodrigue Fisher MD  07/01/22 [id number]

## 2022-07-02 LAB
EKG ATRIAL RATE: 76 BPM
EKG P AXIS: 50 DEGREES
EKG P-R INTERVAL: 152 MS
EKG Q-T INTERVAL: 456 MS
EKG QRS DURATION: 144 MS
EKG QTC CALCULATION (BAZETT): 513 MS
EKG R AXIS: 29 DEGREES
EKG T AXIS: 68 DEGREES
EKG VENTRICULAR RATE: 76 BPM
METER GLUCOSE: 107 MG/DL (ref 74–99)
METER GLUCOSE: 149 MG/DL (ref 74–99)
METER GLUCOSE: 162 MG/DL (ref 74–99)
METER GLUCOSE: 216 MG/DL (ref 74–99)
METER GLUCOSE: 42 MG/DL (ref 74–99)

## 2022-07-02 PROCEDURE — 6370000000 HC RX 637 (ALT 250 FOR IP): Performed by: INTERNAL MEDICINE

## 2022-07-02 PROCEDURE — 2580000003 HC RX 258: Performed by: INTERNAL MEDICINE

## 2022-07-02 PROCEDURE — 96375 TX/PRO/DX INJ NEW DRUG ADDON: CPT

## 2022-07-02 PROCEDURE — 99214 OFFICE O/P EST MOD 30 MIN: CPT | Performed by: INTERNAL MEDICINE

## 2022-07-02 PROCEDURE — 82962 GLUCOSE BLOOD TEST: CPT

## 2022-07-02 PROCEDURE — 6370000000 HC RX 637 (ALT 250 FOR IP): Performed by: SURGERY

## 2022-07-02 PROCEDURE — APPSS45 APP SPLIT SHARED TIME 31-45 MINUTES: Performed by: NURSE PRACTITIONER

## 2022-07-02 PROCEDURE — 6360000002 HC RX W HCPCS: Performed by: INTERNAL MEDICINE

## 2022-07-02 PROCEDURE — 99212 OFFICE O/P EST SF 10 MIN: CPT | Performed by: SURGERY

## 2022-07-02 PROCEDURE — G0378 HOSPITAL OBSERVATION PER HR: HCPCS

## 2022-07-02 PROCEDURE — 6370000000 HC RX 637 (ALT 250 FOR IP)

## 2022-07-02 RX ORDER — PROCHLORPERAZINE EDISYLATE 5 MG/ML
10 INJECTION INTRAMUSCULAR; INTRAVENOUS EVERY 6 HOURS PRN
Status: DISCONTINUED | OUTPATIENT
Start: 2022-07-02 | End: 2022-07-03 | Stop reason: HOSPADM

## 2022-07-02 RX ORDER — BISACODYL 10 MG
10 SUPPOSITORY, RECTAL RECTAL EVERY 6 HOURS
Status: DISCONTINUED | OUTPATIENT
Start: 2022-07-02 | End: 2022-07-03 | Stop reason: HOSPADM

## 2022-07-02 RX ORDER — CARVEDILOL 6.25 MG/1
12.5 TABLET ORAL 2 TIMES DAILY
Status: DISCONTINUED | OUTPATIENT
Start: 2022-07-02 | End: 2022-07-03 | Stop reason: HOSPADM

## 2022-07-02 RX ORDER — ONDANSETRON 2 MG/ML
4 INJECTION INTRAMUSCULAR; INTRAVENOUS EVERY 6 HOURS PRN
Status: DISCONTINUED | OUTPATIENT
Start: 2022-07-02 | End: 2022-07-03 | Stop reason: HOSPADM

## 2022-07-02 RX ORDER — PANTOPRAZOLE SODIUM 40 MG/1
40 TABLET, DELAYED RELEASE ORAL
Status: DISCONTINUED | OUTPATIENT
Start: 2022-07-03 | End: 2022-07-03 | Stop reason: HOSPADM

## 2022-07-02 RX ADMIN — LOSARTAN POTASSIUM 100 MG: 50 TABLET, FILM COATED ORAL at 08:04

## 2022-07-02 RX ADMIN — INSULIN LISPRO 2 UNITS: 100 INJECTION, SOLUTION INTRAVENOUS; SUBCUTANEOUS at 06:22

## 2022-07-02 RX ADMIN — SODIUM CHLORIDE, PRESERVATIVE FREE 10 ML: 5 INJECTION INTRAVENOUS at 08:04

## 2022-07-02 RX ADMIN — LATANOPROST 1 DROP: 50 SOLUTION OPHTHALMIC at 20:15

## 2022-07-02 RX ADMIN — BISACODYL 5 MG: 5 TABLET, COATED ORAL at 11:33

## 2022-07-02 RX ADMIN — BISACODYL 10 MG: 10 SUPPOSITORY RECTAL at 20:15

## 2022-07-02 RX ADMIN — Medication 1 ENEMA: at 13:34

## 2022-07-02 RX ADMIN — PRAVASTATIN SODIUM 20 MG: 20 TABLET ORAL at 08:04

## 2022-07-02 RX ADMIN — SODIUM CHLORIDE, PRESERVATIVE FREE 10 ML: 5 INJECTION INTRAVENOUS at 20:15

## 2022-07-02 RX ADMIN — CARVEDILOL 12.5 MG: 6.25 TABLET, FILM COATED ORAL at 20:15

## 2022-07-02 RX ADMIN — INSULIN LISPRO 16 UNITS: 100 INJECTION, SOLUTION INTRAVENOUS; SUBCUTANEOUS at 17:34

## 2022-07-02 RX ADMIN — INSULIN LISPRO 2 UNITS: 100 INJECTION, SOLUTION INTRAVENOUS; SUBCUTANEOUS at 11:39

## 2022-07-02 RX ADMIN — PROCHLORPERAZINE EDISYLATE 10 MG: 5 INJECTION INTRAMUSCULAR; INTRAVENOUS at 10:04

## 2022-07-02 RX ADMIN — CARVEDILOL 6.25 MG: 6.25 TABLET, FILM COATED ORAL at 08:04

## 2022-07-02 RX ADMIN — Medication 16 G: at 20:10

## 2022-07-02 RX ADMIN — BISACODYL 10 MG: 10 SUPPOSITORY RECTAL at 16:02

## 2022-07-02 ASSESSMENT — ENCOUNTER SYMPTOMS
RESPIRATORY NEGATIVE: 1
VOMITING: 1
COLOR CHANGE: 0
NAUSEA: 1
EYES NEGATIVE: 1
ABDOMINAL PAIN: 1
CONSTIPATION: 1
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
## 2022-07-02 NOTE — CONSULTS
Inpatient Cardiology Consultation      Reason for Consult: Chest pain    Consulting Physician: Dr. Scott Party    Requesting Physician:  Dr. Francisco Nelson    Date of Consultation: 7/2/2022    HISTORY OF PRESENT ILLNESS:     This 72-year-old male is known to [facility] cardiology and is followed by Dr. Mac Peterson. He has a history of a mild left ventricular systolic dysfunction and a left bundle branch block. He was last evaluated in our office on January 31 of this year. His last pharmacologic stress test in November 2020 showed a fixed defect suggestive of a prior myocardial infarction. There was no reversible ischemia. That was unchanged from a previous stress test done in 2019. His blood pressure was elevated and carvedilol was increased. He tells me he was in the emergency room with abdominal pain in December 2021. CT of the abdomen showed diverticulitis and he was given antibiotics. He has had negative colonoscopies in the past but was to follow-up with general surgery. He was evaluated by Dr. Hanh Vela in December and his symptoms had resolved and he declined a colonoscopy. He had no further abdominal pain until yesterday. He has been constipated but that is his \"normal \". The abdominal pain started around 9 AM and was sharp and diffuse. He vomited \"a little \"but has been very nauseated. It was the same pain as in December of last year but now it radiated to his chest.  The chest pain lasted several hours and his wife brought him to the emergency room. EKG on admission was sinus rhythm with a left bundle branch block and was unchanged from her previous EKG done in our office. Blood pressure on admission was 177/77 and he was afebrile with no hypoxia on room air. Chest x-ray was concerning for vascular congestion versus an inflammatory process. CTA of the chest showed large pulmonary nodules which could be inflammatory but could not rule out a neoplasm.   There was no evidence of thoracic aortic aneurysm or dissection. There was only mild coronary artery calcifications and the aorta was minimally atherosclerotic. [employer] There is no mention of the presence of a pulmonary embolism but it was not ruled out. Ultrasound of the kidneys unremarkable. Potassium was 4.2 and BUN 20 with a creatinine of 0.9, troponin 8 and then the repeat was 82 hours later, WBCs 8 and H&H 13.7 and 41.7. A BNP was not done or a D-dimer. He was treated with aspirin and Benadryl. He was also given a fluid bolus. He was to be given Normodyne 20 mg IV for hypertension but patient refused. He was also given Reglan and morphine and Tigan          Past medical history  1. Remote and quit smoking in 1988  2. Hypertension  3. Hyperlipidemia  4. 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 Constipation  6. Degenerative joint disease  7. Glaucoma  8. Insomnia  9. Elevated PSA  10. Colonoscopy reportedly normal  11. Diverticulitis by CAT scan of the abdomen in December 2021  12. COVID-19 in June 2022 did not require hospitalization  13. Left bundle branch block  14. CTA of the chest and July 2022 showed mild coronary artery calcifications and aorta minimally atherosclerotic  15. Chronic constipation  16. Cardiac testing     Echocardiogram: TTE 10/21/2019   Summary   Left ventricular size is grossly normal.   Mild left ventricular concentric hypertrophy noted.   No regional wall motion abnormalities seen.   Normal left ventricular diastolic filling pattern for age.   Ejection fraction is visually estimated at 50%.    Stress test:     Pharmacologic stress 11/2020  Gated SPECT left ventricular ejection fraction was calculated to be 46%, with normal myocardial thickening and wall motion and hypokinesis of the distal anterior and apical  wall.     Impression:    1. ECG during the infusion did not change.    2. The myocardial perfusion imaging was abnormal.    The abnormality was a moderate sized fixed defect without reversibilty in the distal anterior and apical walls suggestive of a prior MI.   3. Overall left ventricular systolic function was abnormal with regional wall motion abnormalities. 4. Intermediate risk general pharmacologic stress test.     Regadenoson stress test 9/25/2019  Gated SPECT left ventricular ejection fraction was calculated to   be 44%, with normal myocardial thickening and wall motion and   hypokinesis of the apex wall. Impression:    1. ECG during the infusion did not change. 2. The myocardial perfusion imaging was abnormal.    The abnormality was a a moderate sized fixed defect in the apical   wall suggestive of a prior MI\  3. Overall left ventricular systolic function was abnormal with   regional wall motion abnormalities. 4. Low risk general pharmacologic stress test.     Medications Prior to admit:  Prior to Admission medications    Medication Sig Start Date End Date Taking? Authorizing Provider   ondansetron (ZOFRAN) 4 MG tablet Take 1 tablet by mouth 3 times daily as needed for Nausea or Vomiting 7/1/22   Kris Davey DO   blood glucose monitor strips Test 3 times a day & as needed for symptoms of irregular blood glucose.  Contour next test strips 6/15/22   Kris Davey DO   linaclotide (LINZESS) 145 MCG capsule Take 1 capsule by mouth every morning (before breakfast)  Patient not taking: Reported on 7/1/2022 6/6/22   Kris Davey DO   insulin aspart (NOVOLOG FLEXPEN) 100 UNIT/ML injection pen Inject 16 Units into the skin 3 times daily (before meals) 16 units 3 times per day 4/26/22   Abi Day MD   insulin detemir (LEVEMIR FLEXTOUCH) 100 UNIT/ML injection pen Inject 30 Units into the skin nightly 4/26/22   Abi Day MD   Insulin Pen Needle (BD PEN NEEDLE MICRO U/F) 32G X 6 MM MISC Uses with insulin 4 times a day 4/26/22   Abi Day MD   carvedilol (COREG) 12.5 MG tablet Take 1 tablet by mouth 2 times daily  Patient taking differently: Take vial 0-12 Units, 0-12 Units, SubCUTAneous, TID WC  insulin lispro (HUMALOG) injection vial 0-6 Units, 0-6 Units, SubCUTAneous, Nightly  sodium chloride flush 0.9 % injection 5-40 mL, 5-40 mL, IntraVENous, 2 times per day  sodium chloride flush 0.9 % injection 5-40 mL, 5-40 mL, IntraVENous, PRN  0.9 % sodium chloride infusion, , IntraVENous, PRN  enoxaparin (LOVENOX) injection 40 mg, 40 mg, SubCUTAneous, Daily  polyethylene glycol (GLYCOLAX) packet 17 g, 17 g, Oral, Daily PRN  acetaminophen (TYLENOL) tablet 650 mg, 650 mg, Oral, Q6H PRN **OR** acetaminophen (TYLENOL) suppository 650 mg, 650 mg, Rectal, Q6H PRN  labetalol (NORMODYNE;TRANDATE) injection 20 mg, 20 mg, IntraVENous, Once  glucose chewable tablet 16 g, 4 tablet, Oral, PRN  dextrose bolus 10% 125 mL, 125 mL, IntraVENous, PRN **OR** dextrose bolus 10% 250 mL, 250 mL, IntraVENous, PRN  glucagon (rDNA) injection 1 mg, 1 mg, IntraMUSCular, PRN  dextrose 5 % solution, 100 mL/hr, IntraVENous, PRN    Allergies:  No known allergies    Social History: Former smoker and quit in 80 and denies alcohol and worked in the [employer] in his been retired and is       Family History:   Family History   Problem Relation Age of Onset    Heart Disease Mother     Cancer Mother     Heart Attack Mother     Other Father         Pneumonia    Kidney Disease Brother     Cancer Sister        REVIEW OF SYSTEMS:     · Constitutional: Denies fatigue, fevers, chills or night sweats  · Eyes: Denies visual changes or drainage  · ENT: Denies headaches or hearing loss. No mouth sores or sore throat. No epistaxis   · Cardiovascular: Denies chest pain, pressure or palpitations. No lower extremity swelling. · Respiratory: Denies CAPPS, cough, orthopnea or PND. No hemoptysis   · Gastrointestinal: Denies hematemesis or anorexia. No hematochezia or melena    · Genitourinary: Denies urgency, dysuria or hematuria.   · Musculoskeletal: Denies gait disturbance, weakness or joint complaints  · Integumentary: Denies rash, hives or pruritis   · Neurological: Denies dizziness, headaches or seizures. No numbness or tingling  · Psychiatric: Denies anxiety or depression. · Endocrine: Denies temperature intolerance. No recent weight change. .  · Hematologic/Lymphatic: Denies abnormal bruising or bleeding. No swollen lymph nodes    PHYSICAL EXAM:   BP (!) 140/63   Pulse 71   Temp 97.7 °F (36.5 °C) (Temporal)   Resp 18   Wt 160 lb (72.6 kg)   SpO2 97%   BMI 26.63 kg/m²   CONST:  Well developed, well nourished who appears of stated age. Awake, alert and cooperative. No apparent distress. HEENT:   Head- Normocephalic, atraumatic   Eyes- Conjunctivae pink, anicteric  Throat- Oral mucosa pink and moist  Neck-  No stridor, trachea midline, no jugular venous distention. No carotid bruit. CHEST: Chest symmetrical and non-tender to palpation. No accessory muscle use or intercostal retractions  RESPIRATORY: Lung sounds - clear throughout fields   CARDIOVASCULAR:     Heart Inspection- shows no noted pulsations  Heart Palpation- no heaves or thrills; PMI is non-displaced   Heart Ausculation- Regular rate and rhythm, no murmur. No s3, s4 or rub   PV: No lower extremity edema. No varicosities. Pedal pulses palpable, no clubbing or cyanosis   ABDOMEN: Soft, non-tender to light palpation. Bowel sounds present. No palpable masses no organomegaly; no abdominal bruit  MS: Good muscle strength and tone. No atrophy or abnormal movements. : Deferred  SKIN: Warm and dry no statis dermatitis or ulcers   NEURO / PSYCH: Oriented to person, place and time. Speech clear and appropriate. Follows all commands.  Pleasant affect     DATA:    ECG / Tele strips: please see HPI   Diagnostic:    No intake or output data in the 24 hours ending 07/02/22 0718    Labs:   CBC:   Recent Labs     07/01/22  1213   WBC 8.0   HGB 13.7   HCT 41.7        BMP:   Recent Labs     07/01/22  1213      K 4.2   CO2 19*   BUN 20   CREATININE 0.9   LABGLOM >60   CALCIUM 9.6     Mag: No results for input(s): MG in the last 72 hours. Phos: No results for input(s): PHOS in the last 72 hours. TFT:   Lab Results   Component Value Date    TSH 2.250 09/30/2021    [id number] 6.4 09/30/2021      HgA1c:   Lab Results   Component Value Date    LABA1C 8.5 (H) 02/16/2022     No results found for: EAG  proBNP: No results for input(s): PROBNP in the last 72 hours. PT/INR: No results for input(s): PROTIME, INR in the last 72 hours. APTT:No results for input(s): APTT in the last 72 hours. CARDIAC ENZYMES:  Recent Labs     07/01/22  1213 07/01/22  1420   TROPHS 8 8     FASTING LIPID PANEL:  Lab Results   Component Value Date/Time    CHOL 162 02/10/2022 07:25 AM    HDL 39 02/10/2022 07:25 AM    LDLCALC 98 02/10/2022 07:25 AM    TRIG 126 02/10/2022 07:25 AM     LIVER PROFILE:  Recent Labs     07/01/22  1213   AST 22   ALT 21   LABALBU 4.3       Electronically signed by ELHAM Victor CNP on 7/2/2022 at 7:18 AM     Impression and plan by Dr. Anitra Calvo     I have personally seen and evaluated the patient. I personally obtained the history and performed the physical exam.  I personally reviewed all of the above labs, history, review of systems, and data. All of the assessments and recommendations are from me. All of the above cardiac medical decisions are from me. Please see my additional contributions to the history, physical exam, assessment, and recommendations below. History of chief complaint:  He was in the hospital earlier this year with diverticulitis. He states that yesterday he felt \"the same abdominal pain. \"  He states that then he had nausea and vomiting. After this the discomfort in his abdomen radiated up through his chest.  That discomfort and lasted for couple of hours. He states today he has no chest discomfort. Review of systems:     Heart: as above   Lungs: as above   Eyes: denies changes in vision or discharge. Ears: denies changes in hearing or pain. Nose: denies epistaxis or masses   Throat: denies sore throat or trouble swallowing. Neuro: denies numbness, tingling, tremors. Skin: denies rashes or itching. : denies hematuria, dysuria   GI: denies vomiting, diarrhea   Psych: denies mood changed, anxiety, depression. Physical exam:  BP (!) 156/74   Pulse 65   Temp 97.5 °F (36.4 °C) (Temporal)   Resp 12   Wt 160 lb (72.6 kg)   SpO2 96%   BMI 26.63 kg/m²   Constitutional: A&O x3, communicates well, no acute distress. Eyes: extraocular muscles intact, PERRL. Normal lids & conjunctiva. No icterus. ENT: clear, no bleeding. No external masses. Lips normal formation. Neck: supple, full ROM, no JVD, no bruits, no lymphadenopathy. No masses. trachea midline. Heart: regular rate & rhythm, normal S1 & S2, no abnormal murmurs. No heave. Lungs: CTA. No accessory muscles. Abd: soft, non-tender. Normal bowel sounds. Neuro: Full ROM X 4, EOMI, no tremors. EXT: No bilateral lower extremity edema  Skin: warm, dry, intact. Good turgor. Psych: A&O x 3, normal behavior, not anxious. Patient seen and examined. Chart, labs & data reviewed. A:  1. Noncardiac chest discomfort. 2. Ischemic cardiomyopathy. EF 45 to 50%. Well compensated today. 3. Hypertension, not well controlled today. 4. Diabetes  5. Hypertension  6. Hypercholesterolemia  7. Diverticulitis      Rec:  1. Increase Coreg to 12.5 mg twice daily. 2. Stress test 10-19 and 11-20 both showing distal anteroapical and apical fixed defect from his prior myocardial infarction with no new ischemia. His symptoms this admission do not sound cardiac. No further cardiac testing is planned at this time. 3. Cardiology will sign off. Electronically signed by Roel Amezcua DO on 7/2/2022 at 12:58 PM    Note: This report was completed using computerized voice recognition software.  Every effort has been made to ensure accuracy, however; and invert and computerized transcription errors may be present.
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
## 2022-07-02 NOTE — PROGRESS NOTES
0800: Pt extremely nauseated and dry heaving this morning, no pain or tenderness but stomach is hard on the right side, bowel sounds hypoactive, concern for blockage. 1400: Pt had BM, feeling much better, no nausea at this time. 1715: Pt ate dinner, no nausea or vomiting, abdomen is soft, non-tender.

## 2022-07-02 NOTE — PLAN OF CARE
Problem: Discharge Planning  Goal: Discharge to home or other facility with appropriate resources  Outcome: Progressing     Problem: Discharge Planning  Goal: Discharge to home or other facility with appropriate resources  Outcome: Progressing     Problem: Pain  Goal: Verbalizes/displays adequate comfort level or baseline comfort level  Outcome: Progressing
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
## 2022-07-03 VITALS
DIASTOLIC BLOOD PRESSURE: 69 MMHG | RESPIRATION RATE: 14 BRPM | HEART RATE: 71 BPM | BODY MASS INDEX: 26.63 KG/M2 | OXYGEN SATURATION: 98 % | SYSTOLIC BLOOD PRESSURE: 149 MMHG | WEIGHT: 160 LBS | TEMPERATURE: 97 F

## 2022-07-03 LAB
METER GLUCOSE: 154 MG/DL (ref 74–99)
METER GLUCOSE: 221 MG/DL (ref 74–99)

## 2022-07-03 PROCEDURE — 82962 GLUCOSE BLOOD TEST: CPT

## 2022-07-03 PROCEDURE — 6370000000 HC RX 637 (ALT 250 FOR IP): Performed by: SURGERY

## 2022-07-03 PROCEDURE — 6370000000 HC RX 637 (ALT 250 FOR IP)

## 2022-07-03 PROCEDURE — 6370000000 HC RX 637 (ALT 250 FOR IP): Performed by: INTERNAL MEDICINE

## 2022-07-03 PROCEDURE — 2580000003 HC RX 258: Performed by: INTERNAL MEDICINE

## 2022-07-03 PROCEDURE — G0378 HOSPITAL OBSERVATION PER HR: HCPCS

## 2022-07-03 RX ORDER — PANTOPRAZOLE SODIUM 40 MG/1
40 TABLET, DELAYED RELEASE ORAL
Qty: 30 TABLET | Refills: 3 | Status: SHIPPED | OUTPATIENT
Start: 2022-07-04 | End: 2022-07-28

## 2022-07-03 RX ORDER — PROCHLORPERAZINE MALEATE 10 MG
10 TABLET ORAL EVERY 6 HOURS PRN
Qty: 30 TABLET | Refills: 0 | Status: SHIPPED | OUTPATIENT
Start: 2022-07-03

## 2022-07-03 RX ADMIN — INSULIN LISPRO 16 UNITS: 100 INJECTION, SOLUTION INTRAVENOUS; SUBCUTANEOUS at 12:27

## 2022-07-03 RX ADMIN — PRAVASTATIN SODIUM 20 MG: 20 TABLET ORAL at 08:16

## 2022-07-03 RX ADMIN — INSULIN LISPRO 2 UNITS: 100 INJECTION, SOLUTION INTRAVENOUS; SUBCUTANEOUS at 05:36

## 2022-07-03 RX ADMIN — SODIUM CHLORIDE, PRESERVATIVE FREE 10 ML: 5 INJECTION INTRAVENOUS at 08:17

## 2022-07-03 RX ADMIN — BISACODYL 10 MG: 10 SUPPOSITORY RECTAL at 05:35

## 2022-07-03 RX ADMIN — CARVEDILOL 12.5 MG: 6.25 TABLET, FILM COATED ORAL at 08:16

## 2022-07-03 RX ADMIN — LOSARTAN POTASSIUM 100 MG: 50 TABLET, FILM COATED ORAL at 08:16

## 2022-07-03 RX ADMIN — BISACODYL 10 MG: 10 SUPPOSITORY RECTAL at 08:17

## 2022-07-03 RX ADMIN — PANTOPRAZOLE SODIUM 40 MG: 40 TABLET, DELAYED RELEASE ORAL at 05:35

## 2022-07-03 NOTE — PROGRESS NOTES
Subjective:    Chief complaint:    Patient is feeling much better with Compazine  He wants to go home  Multiple family members by the bedside  Objective:    BP (!) 149/69   Pulse 71   Temp 97 °F (36.1 °C) (Temporal)   Resp 14   Wt 160 lb (72.6 kg)   SpO2 98%   BMI 26.63 kg/m²   General : Awake ,alert,no distress. Heart:  RRR, no murmurs, gallops, or rubs. Lungs:  CTA bilaterally, no wheeze, rales or rhonchi  Abd: bowel sounds present, nontender, nondistended, no masses  Extrem:  No clubbing, cyanosis, or edema    CBC:   Lab Results   Component Value Date/Time    WBC 8.0 07/01/2022 12:13 PM    RBC 4.89 07/01/2022 12:13 PM    HGB 13.7 07/01/2022 12:13 PM    HCT 41.7 07/01/2022 12:13 PM    MCV 85.3 07/01/2022 12:13 PM    MCH 28.0 07/01/2022 12:13 PM    MCHC 32.9 07/01/2022 12:13 PM    RDW 14.2 07/01/2022 12:13 PM     07/01/2022 12:13 PM    MPV 13.6 07/01/2022 12:13 PM     BMP:    Lab Results   Component Value Date/Time     07/01/2022 12:13 PM    K 4.2 07/01/2022 12:13 PM     07/01/2022 12:13 PM    CO2 19 07/01/2022 12:13 PM    BUN 20 07/01/2022 12:13 PM    LABALBU 4.3 07/01/2022 12:13 PM    CREATININE 0.9 07/01/2022 12:13 PM    CALCIUM 9.6 07/01/2022 12:13 PM    GFRAA >60 07/01/2022 12:13 PM    LABGLOM >60 07/01/2022 12:13 PM    GLUCOSE 190 07/01/2022 12:13 PM     PT/INR:  No results found for: PROTIME, INR  Troponin:    Lab Results   Component Value Date/Time    TROPONINI <0.01 04/20/2020 03:54 PM       No results for input(s): LABURIN in the last 72 hours. No results for input(s): BC in the last 72 hours. No results for input(s): Clarnce Lax in the last 72 hours.       Current Facility-Administered Medications:     ondansetron (ZOFRAN) injection 4 mg, 4 mg, IntraVENous, Q6H PRN, Divya Brooke MD    prochlorperazine (COMPAZINE) injection 10 mg, 10 mg, IntraVENous, Q6H PRN, Divya Brooke MD, 10 mg at 07/02/22 1004    pantoprazole (PROTONIX) tablet 40 mg, 40 mg, Oral, QAM Silvia Conti MD, 40 mg at 07/03/22 0535    carvedilol (COREG) tablet 12.5 mg, 12.5 mg, Oral, BID, Carey Urena, DO, 12.5 mg at 07/03/22 0816    bisacodyl (DULCOLAX) suppository 10 mg, 10 mg, Rectal, Q6H, Ayan Graves MD, 10 mg at 07/03/22 0817    insulin lispro (HUMALOG) injection vial 16 Units, 16 Units, SubCUTAneous, TID WC, Randi Barry, DO, 16 Units at 07/02/22 1734    insulin glargine-yfgn (SEMGLEE-YFGN) injection vial 30 Units, 30 Units, SubCUTAneous, Nightly, Randi Barry, DO, 30 Units at 07/01/22 2118    latanoprost (XALATAN) 0.005 % ophthalmic solution 1 drop, 1 drop, Left Eye, Nightly, Pantera Tilley DO, 1 drop at 07/02/22 2015    linaclotide (LINZESS) capsule 145 mcg, 145 mcg, Oral, QAM ACHosea Herb Serafinmer, DO, 145 mcg at 07/03/22 0535    losartan (COZAAR) tablet 100 mg, 100 mg, Oral, Daily, Randi Barry, DO, 100 mg at 07/03/22 1032    pravastatin (PRAVACHOL) tablet 20 mg, 20 mg, Oral, Daily, Randi Barry, DO, 20 mg at 07/03/22 0816    insulin lispro (HUMALOG) injection vial 0-12 Units, 0-12 Units, SubCUTAneous, TID WC, Randi Barry DO, 2 Units at 07/03/22 0536    insulin lispro (HUMALOG) injection vial 0-6 Units, 0-6 Units, SubCUTAneous, Nightly, Randi Barry, DO    sodium chloride flush 0.9 % injection 5-40 mL, 5-40 mL, IntraVENous, 2 times per day, Pantera Tilley DO, 10 mL at 07/03/22 9771    sodium chloride flush 0.9 % injection 5-40 mL, 5-40 mL, IntraVENous, PRN, Randi Barry DO    0.9 % sodium chloride infusion, , IntraVENous, PRN, Randi Barry,     enoxaparin (LOVENOX) injection 40 mg, 40 mg, SubCUTAneous, Daily, Randi Barry,     polyethylene glycol (GLYCOLAX) packet 17 g, 17 g, Oral, Daily PRN, Randi Barry,     acetaminophen (TYLENOL) tablet 650 mg, 650 mg, Oral, Q6H PRN **OR** acetaminophen (TYLENOL) suppository 650 mg, 650 mg, Rectal, Q6H PRN, Randi Barry,     labetalol (NORMODYNE;TRANDATE) injection 20 mg, 20 mg, IntraVENous, Once, Haider Mejia MD    glucose chewable tablet 16 g, 4 tablet, Oral, PRN, Hayes Schmidt Malmer, DO, 16 g at 07/02/22 2010    dextrose bolus 10% 125 mL, 125 mL, IntraVENous, PRN **OR** dextrose bolus 10% 250 mL, 250 mL, IntraVENous, PRN, Tenny [place] Malmer, DO    glucagon (rDNA) injection 1 mg, 1 mg, IntraMUSCular, PRN, Tenny [place] Malmer, DO    dextrose 5 % solution, 100 mL/hr, IntraVENous, PRN, Tenny [place] Malmer, DO    ADULT DIET; Regular; 4 carb choices (60 gm/meal); Low Fat/Low Chol/High Fiber/CARMINA    US RETROPERITONEAL COMPLETE   Final Result   1. Mild bilateral renal cortical thinning. There is no hydronephrosis. (The described left renal lesion on recent CT scan is not identified on this   imaging modality. Recommend multiphase renal CT or MRI for better   characterization.)      2. Normal appearance of the imaged bladder. CTA CHEST W CONTRAST   Final Result   1. No evidence of thoracic aortic aneurysm or dissection. 2.  Enlarged mediastinal lymph nodes and mildly enlarged right hilar lymph   node, as described above. These could be reactive. Neoplastic lymph nodes   cannot be entirely excluded. If clinically warranted, PET/CT can be   considered. 3.  Multiple areas of tree-in-bud opacities involving the lungs bilaterally   most prominently in the upper lobes, right greater than left. There is seen   to a more limited degree in the right middle lobe, lingula, and lower lobes. Multiple, subcentimeter centrilobular nodules are also identified in the same   distribution. The findings are nonspecific and may represent   infective/inflammatory bronchiolitis. Given the lymph nodes in the   mediastinum and right hilum, malignancy cannot be excluded. Pulmonary   consultation is recommended. 4.  Pulmonary parenchymal scar formation in the left upper lobe. 5.  Larger pulmonary nodules seen bilaterally, as described above.   Again,   this could represent inflammatory nodules. Neoplastic disease cannot be   excluded. CTA ABDOMEN PELVIS W CONTRAST   Final Result   1. No evidence of abdominal aortic aneurysm or dissection. 2.  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 3.  Again suggestion of gallbladder adenomyomatosis, when compared to the   previous CT study performed 12/09/2021.      4.  Mild interval enlargement of a probable complex left renal cortical cyst.   Given its mild interval increase in size, renal ultrasound is recommended. 5.  2 focal areas of luminal narrowing of the colon, as described above. This probably represents phasicity/peristalsis. Colonic lesions cannot be   entirely excluded. Clinical correlation is recommended. 6.  Enlarged prostate gland again noted. 7.  Ovoid radiolucency in the left iliac bone, which may represent a bone   cyst.  Other lytic entities cannot be excluded. Radionuclide medicine bone   scan can be performed for further evaluation. 8.  Other findings, as described         XR CHEST PORTABLE   Final Result   Increased perihilar lung markings concerning for central vascular congestion   versus peribronchial inflammatory process similar to prior. No new   consolidation or pleural effusion. Assessment:    Principal Problem:    Chest pain  Active Problems:    Intractable nausea and vomiting  Resolved Problems:    * No resolved hospital problems. *  Asbestosis underlying  Mediastinal adenopathy      Plan:    Surgery and pulmonary input noted  Outpatient follow-up as well as lung nodules and pulmonary abnormalities  Cardiology signed off  If okay with general surgery discharged with outpatient follow-up        Yrn Isabel MD  10:21 AM  7/3/2022    NOTE: This report was transcribed using voice recognition software.  Every effort was made to ensure accuracy; however, inadvertent transcription errors may be present

## 2022-07-05 ENCOUNTER — CARE COORDINATION (OUTPATIENT)
Dept: CASE MANAGEMENT | Age: 87
End: 2022-07-05

## 2022-07-05 NOTE — CARE COORDINATION
Marj [facility] Transitions Initial Follow Up Call    Call within 2 business days of discharge: Yes    Patient: Hua Cárdenas Patient : 1932   MRN: [id number]  Reason for Admission: chest pain  Discharge Date: 7/3/22 RARS: No data recorded    Last Discharge [facility]       Complaint Diagnosis Description Type Department Provider    22 Chest Pain Chest pain, unspecified type ED to Hosp-Admission (Discharged) (ADMITTED) SEY[facility] 7WE [address]; [address] [place] [address]. .. Transitions of Care Initial Call        Challenges to be reviewed by the provider   Additional needs identified to be addressed with provider: No  none             Method of communication with provider : none    Advance Care Planning:   Does patient have an Advance Directive: decision maker reviewed and current. Care Transition Nurse contacted the patient by telephone via use of  to perform post hospital discharge assessment. Verified name and  with patient as identifiers. Provided introduction to self, and explanation of the CTN role. CTN reviewed discharge instructions, medical action plan and red flags with patient who verbalized understanding. Patient given an opportunity to ask questions and does not have any further questions or concerns at this time. Were discharge instructions available to patient? Yes. Reviewed appropriate site of care based on symptoms and resources available to patient including: PCP  Specialist. The patient agrees to contact the PCP office for questions related to their healthcare. Medication reconciliation was not performed with patient and he did not [facility]. CTN able to confirm with patient new medications as per AVS.  1111F not entered. Was patient discharged with a pulse oximeter? no    CTN provided contact information. Plan for follow-up call in 7-10 days based on severity of symptoms and risk factors.   Plan for next call: symptom management-any return of chest/abdominal pain, N&V?  follow up appointment-PCP date    Non-face-to-face services provided:  Scheduled appointment with PCP-CTN explained to patient recommendation to have contact with PCP 1-2 weeks post hospital discharge. Patient to call. CTN will route to PCP office need for TCM/hospital follow up appointment. Scheduled appointment with Specialist-as noted below. Patient informed CTN he has a eye appointment tomorrow(7/6/22)at [facility]. Obtained and reviewed discharge summary and/or continuity of care documents    Care Transitions 24 Hour Call    Do you have a copy of your discharge instructions?: Yes  Do you have all of your prescriptions and are they filled?: Yes  Have you been contacted by a [address]?: No  Have you scheduled your follow up appointment?: No  Do you feel like you have everything you need to keep you well at home?: Yes  Care Transitions Interventions    Pharmacist: Jose Carlos Prather with patient for initial care transition call post hospital discharge.  used but patient was able to understand and reply in [address] [place] [address] Drive to many questions.  -Patient admitted to [facility] ([facility] on 7/1/22  with symptoms of chest/abdominal pain and N&V.  -Patient reports all symptoms have resolved and feels the new medications ordered at hospital discharge are helping. Patient states he is eating/drinking well.  -Patient states he drives himself to any appointments.  -Patient denies any needs, questions, or concerns at this time and is agreeable to continued follow up from CTN.     Follow Up  Future Appointments   Date Time Provider Jane Garcia   7/22/2022  8:45 AM Benito Arreloa MD [facility]   8/31/2022 10:30 AM ELHAM Darling - NP ALESSIA [facility]   10/14/2022  8:15 AM DO YULIANA Kemp [facility]       Jeimy Marroquin RN

## 2022-07-07 ENCOUNTER — TELEPHONE (OUTPATIENT)
Dept: PRIMARY CARE CLINIC | Age: 87
End: 2022-07-07

## 2022-07-07 ENCOUNTER — OFFICE VISIT (OUTPATIENT)
Dept: PRIMARY CARE CLINIC | Age: 87
End: 2022-07-07
Payer: MEDICARE

## 2022-07-07 DIAGNOSIS — I10 ESSENTIAL HYPERTENSION: ICD-10-CM

## 2022-07-07 DIAGNOSIS — K59.09 OTHER CONSTIPATION: ICD-10-CM

## 2022-07-07 DIAGNOSIS — Z09 HOSPITAL DISCHARGE FOLLOW-UP: Primary | ICD-10-CM

## 2022-07-07 DIAGNOSIS — I25.10 CORONARY ARTERY DISEASE INVOLVING NATIVE CORONARY ARTERY OF NATIVE HEART WITHOUT ANGINA PECTORIS: Chronic | ICD-10-CM

## 2022-07-07 DIAGNOSIS — R91.1 LUNG NODULE: ICD-10-CM

## 2022-07-07 PROCEDURE — 99496 TRANSJ CARE MGMT HIGH F2F 7D: CPT | Performed by: FAMILY MEDICINE

## 2022-07-07 PROCEDURE — 1111F DSCHRG MED/CURRENT MED MERGE: CPT | Performed by: FAMILY MEDICINE

## 2022-07-07 RX ORDER — CARVEDILOL 12.5 MG/1
TABLET ORAL
Qty: 120 TABLET | Refills: 12 | Status: SHIPPED
Start: 2022-07-07 | End: 2022-07-07

## 2022-07-07 RX ORDER — CARVEDILOL 12.5 MG/1
TABLET ORAL
Qty: 360 TABLET | Refills: 12 | Status: SHIPPED
Start: 2022-07-07 | End: 2022-07-08 | Stop reason: SDUPTHER

## 2022-07-07 NOTE — TELEPHONE ENCOUNTER
Pharm calling to clarify that you wanted to increase pts Coreg as much as you did.  They state is is a very large jump and wanted to verify it is correct before they fill it
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
## 2022-07-08 VITALS
TEMPERATURE: 98.2 F | WEIGHT: 161 LBS | SYSTOLIC BLOOD PRESSURE: 138 MMHG | BODY MASS INDEX: 26.79 KG/M2 | DIASTOLIC BLOOD PRESSURE: 83 MMHG

## 2022-07-08 PROBLEM — R91.1 LUNG NODULE: Status: ACTIVE | Noted: 2022-07-08

## 2022-07-08 RX ORDER — CARVEDILOL 12.5 MG/1
12.5 TABLET ORAL 2 TIMES DAILY
Qty: 180 TABLET | Refills: 12 | Status: SHIPPED | OUTPATIENT
Start: 2022-07-08

## 2022-07-08 NOTE — TELEPHONE ENCOUNTER
The heart doctor in the hospital increased it in the hospital.  The correct dose should be 12.5. Twice a day. Not  Two   twice a day.   I will request that  prescription

## 2022-07-08 NOTE — PROGRESS NOTES
Post-Discharge Transitional Care  Follow Up      Mitch Rogers   YOB: 1932    Date of Office Visit:  7/7/2022  Date of Hospital Admission: 7/1/22  Date of Hospital Discharge: 7/3/22  Risk of hospital readmission (high >=14%. Medium >=10%) :No data recorded    Care management risk score Rising risk (score 2-5) and Complex Care (Scores >=6): 2     Non face to face  following discharge, date last encounter closed (first attempt may have been earlier): 7/5/2022  3:11 PM    Call initiated 2 business days of discharge: Yes    ASSESSMENT/PLAN:   Hospital discharge follow-up  -     MD DISCHARGE MEDS RECONCILED W/ CURRENT OUTPATIENT MED LIST  Essential hypertension  -     carvedilol (COREG) 12.5 MG tablet; Two    Tabs   Twice a day, Disp-360 tablet, R-12Normal  Other constipation  -     linaclotide (LINZESS) 290 MCG CAPS capsule; Take 1 capsule by mouth every morning (before breakfast), Disp-90 capsule, R-12Normal  Lung nodule  Coronary artery disease involving native coronary artery of native heart without angina pectoris      Medical Decision Making: high complexity  Return in about 3 weeks (around 7/28/2022). On this date 7/7/2022 I have spent 45 minutes reviewing previous notes, test results and face to face with the patient discussing the diagnosis and importance of compliance with the treatment plan as well as documenting on the day of the visit. Subjective:   HPI:  Follow up of Hospital problems/diagnosis(es): We will discharge for constipation abdominal pain chest pain nodules    Inpatient course: Discharge summary reviewed- see chart. Interval history/Current status: 20 ER with abdominal pain chest pain and constipation. Was seen by cardiology felt to be noncardiac. Seen by surgery and felt to be constipation they offered colonoscopy and upper endoscopy and patient refused. Noted to nodules in his lung pulmonary saw the patient advised CT and 16weeks.   I told the patient make sure he reminds us to schedule that. Cardiology increase his Coreg to 2 twice daily. He is taking Linzess on the medium dose today I increase it to the highest dose today. Take MiraLAX as needed. Patient Active Problem List   Diagnosis    Essential hypertension    Mixed hyperlipidemia    Uncontrolled type 1 diabetes mellitus with hyperglycemia ([facility] [address]    Primary insomnia    Persistent proteinuria    EKG abnormalities    Coronary artery disease involving native coronary artery of native heart without angina pectoris    Acute cystitis without hematuria    Constipation    Diverticulitis    Adrenal mass (HCC)    Chest pain    Intractable nausea and vomiting    Lung nodule       Medications listed as ordered at the time of discharge from hospital     Medication List          Accurate as of July 7, 2022 11:59 PM. If you have any questions, ask your nurse or doctor. CHANGE how you take these medications    carvedilol 12.5 MG tablet  Commonly known as: COREG  Two    Tabs   Twice a day  What changed:   · how much to take  · how to take this  · when to take this  · additional instructions  Changed by: DO Ana Craft linaclotide 145 MCG capsule  Commonly known as: LINZESS  Take 1 capsule by mouth every morning (before breakfast)  What changed: Another medication with the same name was added. Make sure you understand how and when to take each. Changed by: Lindsay Davey DO     * linaclotide 290 MCG Caps capsule  Commonly known as: LINZESS  Take 1 capsule by mouth every morning (before breakfast)  What changed: You were already taking a medication with the same name, and this prescription was added. Make sure you understand how and when to take each. Changed by: Gloria Flynn DO         * This list has 2 medication(s) that are the same as other medications prescribed for you. Read the directions carefully, and ask your doctor or other care provider to review them with you. CONTINUE taking these medications    aspirin 81 MG tablet     * BD Pen Needle Micro U/F 32G X 6 MM Misc  Generic drug: Insulin Pen Needle  Uses with insulin 4 times a day     * B-D UF III MINI PEN NEEDLES 31G X 5 MM Misc  Generic drug: Insulin Pen Needle     * blood glucose test strips strip  Commonly known as: ASCENSIA AUTODISC VI;ONE TOUCH ULTRA TEST VI  1 each by In Vitro route daily As needed. * blood glucose test strips  Test 3 times a day & as needed for symptoms of irregular blood glucose. Contour next test strips     FreeStyle Kennedi 2 Sensor Misc  Change sensor every 14 days     Insulin Syringes (Disposable) U-100 0.3 ML Misc  1 each by Does not apply route 3 times daily     latanoprost 0.005 % ophthalmic solution  Commonly known as: XALATAN     Levemir FlexTouch 100 UNIT/ML injection pen  Generic drug: insulin detemir  Inject 30 Units into the skin nightly     losartan 100 MG tablet  Commonly known as: COZAAR  Take 1 tablet by mouth daily     NovoLOG FlexPen 100 UNIT/ML injection pen  Generic drug: insulin aspart  Inject 16 Units into the skin 3 times daily (before meals) 16 units 3 times per day     ondansetron 4 MG tablet  Commonly known as: ZOFRAN  Take 1 tablet by mouth 3 times daily as needed for Nausea or Vomiting     Ozempic (0.25 or 0.5 MG/DOSE) 2 MG/1.5ML Sopn  Generic drug: Semaglutide(0.25 or 0.5MG/DOS)  Inject 0.5 mg into the skin once a week     pantoprazole 40 MG tablet  Commonly known as: PROTONIX  Take 1 tablet by mouth every morning (before breakfast)     pravastatin 20 MG tablet  Commonly known as: PRAVACHOL  Take 1 tablet by mouth daily     prochlorperazine 10 MG tablet  Commonly known as: COMPAZINE  Take 1 tablet by mouth every 6 hours as needed (nausea)         * This list has 4 medication(s) that are the same as other medications prescribed for you. Read the directions carefully, and ask your doctor or other care provider to review them with you.                Where to Get Your Medications      These medications were sent to [id number], [address]    Phone: [phone]   · carvedilol 12.5 MG tablet  · linaclotide 290 MCG Caps capsule           Medications marked \"taking\" at this time  Outpatient Medications Marked as Taking for the 7/7/22 encounter (Office Visit) with Julius Davey, DO   Medication Sig Dispense Refill    linaclotide (LINZESS) 290 MCG CAPS capsule Take 1 capsule by mouth every morning (before breakfast) 90 capsule 12    carvedilol (COREG) 12.5 MG tablet Two    Tabs   Twice a day 360 tablet 12        Medications patient taking as of now reconciled against medications ordered at time of hospital discharge: Yes    A comprehensive review of systems was negative except for what was noted in the HPI.     Objective:    /83   Temp 98.2 °F (36.8 °C) (Oral)   Wt 161 lb (73 kg)   BMI 26.79 kg/m²   General Appearance: alert and oriented to person, place and time, well developed and well- nourished, in no acute distress  Skin: warm and dry, no rash or erythema  Head: normocephalic and atraumatic  Eyes: pupils equal, round, and reactive to light, extraocular eye movements intact, conjunctivae normal  ENT: tympanic membrane, external ear and ear canal normal bilaterally, nose without deformity, nasal mucosa and turbinates normal without polyps  Neck: supple and non-tender without mass, no thyromegaly or thyroid nodules, no cervical lymphadenopathy  Pulmonary/Chest: clear to auscultation bilaterally- no wheezes, rales or rhonchi, normal air movement, no respiratory distress  Cardiovascular: normal rate, regular rhythm, normal S1 and S2, no murmurs, rubs, clicks, or gallops, distal pulses intact, no carotid bruits  Abdomen: soft, non-tender, non-distended, normal bowel sounds, no masses or organomegaly  Extremities: no cyanosis, clubbing or edema  Musculoskeletal: normal range of motion, no joint swelling, deformity or tenderness  Neurologic: reflexes normal and symmetric, no cranial nerve deficit, gait, coordination and speech normal        Increase Linzess dose. We will plan for pulmonary CAT scan in 6 to 8 weeks. Follow-up with surgery cardiology and pulmonary. Increase Coreg to 2 twice daily as advised in the hospital.  Call if any chest pain or shortness of breath. Check blood pressure twice a day at home. Low-salt low caffeine diet        I educated the patient about all medications. Make sure they were correct and educated  on the risk associated with missing meds or taking them incorrectly. A list of medications is being sent home with patient today. Check blood pressure at home twice a day. Low-salt low caffeine diet. Call if systolic blood pressure is above 048 and diastolic blood pressures above 85. Only use a upper arm digital cuff. Aggressive low-fat diet. Avoid red meats, greasy fried foods, dairy products. Avoid processed foods. Take cholesterol medications without food. I informed patient about the risk associated with noncompliance of medication and taking medications incorrectly. Appropriate follow-up with myself and all specialist.  Encourage family members to take active role in assisting with medications and medical care. If any confusion should develop to notify my office immediately to avoid risk of worsening medical condition      A great deal of time spent reviewing medications, diet, exercise, social issues. Also reviewing the chart before entering the room with patient and finishing charting after leaving patient's room. More than half of that time was spent face to face with the patient in counseling and coordinating care. An electronic signature was used to authenticate this note.   --Rosy Anglin,

## 2022-07-14 ENCOUNTER — CARE COORDINATION (OUTPATIENT)
Dept: CASE MANAGEMENT | Age: 87
End: 2022-07-14

## 2022-07-14 NOTE — CARE COORDINATION
[facility] Transitions Follow Up Call    2022    Patient: Shivani Villalobos  Patient : 1932   MRN: [id number]  Reason for Admission: chest pain  Discharge Date: 7/3/22 RARS: No data recorded    Care Transitions Follow Up Call    Needs to be reviewed by the provider   Additional needs identified to be addressed with provider: No  none             Method of communication with provider : none      Care Transition Nurse contacted the patient by telephone via use of  to follow up after admission on 22. Addressed changes since last contact: medications-coreg to 12.5 mg twice daily, linzess to 290 mg daily  completed PCP visit     Discussed follow-up appointments. If no appointment was previously scheduled, appointment scheduling offered: n/a. Is follow up appointment scheduled within 7 days of discharge? Yes, PCP visit completed on 22. Patients top risk factors for readmission: medical condition-chest pain, DM, HTN  polypharmacy  Interventions to address risk factors: continue consistent follow up with providers      Non-[facility] follow up appointment(s): none    CTN provided contact information for future needs. Plan for follow-up call in 10-14 days based on severity of symptoms and risk factors. Plan for next call: follow up appointment-review any provider visits          Care Transitions Subsequent and Final Call    Subsequent and Final Calls  Do you have any ongoing symptoms?: No  Have your medications changed?: Yes  Patient Reports: see note  Do you have any questions related to your medications?: No  Do you currently have any active services?: No  Do you have any needs or concerns that I can assist you with?: No  Identified Barriers: None  Care Transitions Interventions  No Identified Needs    Pharmacist: Declined    Other Interventions:         -Spoke with patient via use of  for follow up care transition call.   Patient again able to communicate well with CTN with minimal use of .  -Patient reports he is \"doing real good,\" no return of chest/abdominal pain, N&V.  -CTN confirmed with patient new doses of coreg and linzess, patient taking as prescribed.  -Patient aware of below cardiology visit.  -Patient denies any needs, questions, or concerns at this time and is agreeable to continued follow up from CTN.     Follow Up  Future Appointments   Date Time Provider Jane Garcia   7/22/2022  8:45 AM Mansoor Hilton MD [address] [facility]   7/28/2022  9:15 AM DO YULIANA Cuello [facility]   8/31/2022 10:30 AM ELHAM Vance - NP [facility]   10/14/2022  8:15 AM DO YULIANA Cuello [facility]       Elizabeth Espinal RN

## 2022-07-22 ENCOUNTER — OFFICE VISIT (OUTPATIENT)
Dept: CARDIOLOGY CLINIC | Age: 87
End: 2022-07-22
Payer: MEDICARE

## 2022-07-22 VITALS
WEIGHT: 161.9 LBS | HEART RATE: 51 BPM | DIASTOLIC BLOOD PRESSURE: 70 MMHG | BODY MASS INDEX: 26.02 KG/M2 | SYSTOLIC BLOOD PRESSURE: 130 MMHG | RESPIRATION RATE: 14 BRPM | HEIGHT: 66 IN

## 2022-07-22 DIAGNOSIS — I10 ESSENTIAL HYPERTENSION: Primary | ICD-10-CM

## 2022-07-22 DIAGNOSIS — R00.1 SINUS BRADYCARDIA: ICD-10-CM

## 2022-07-22 DIAGNOSIS — I42.9 CARDIOMYOPATHY, UNSPECIFIED TYPE (HCC): ICD-10-CM

## 2022-07-22 DIAGNOSIS — I44.7 LBBB (LEFT BUNDLE BRANCH BLOCK): ICD-10-CM

## 2022-07-22 DIAGNOSIS — I25.10 CORONARY ARTERY DISEASE INVOLVING NATIVE CORONARY ARTERY OF NATIVE HEART WITHOUT ANGINA PECTORIS: Chronic | ICD-10-CM

## 2022-07-22 PROCEDURE — 99214 OFFICE O/P EST MOD 30 MIN: CPT | Performed by: INTERNAL MEDICINE

## 2022-07-22 PROCEDURE — 1123F ACP DISCUSS/DSCN MKR DOCD: CPT | Performed by: INTERNAL MEDICINE

## 2022-07-22 PROCEDURE — 93000 ELECTROCARDIOGRAM COMPLETE: CPT | Performed by: INTERNAL MEDICINE
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
## 2022-07-26 ENCOUNTER — CARE COORDINATION (OUTPATIENT)
Dept: CASE MANAGEMENT | Age: 87
End: 2022-07-26

## 2022-07-26 NOTE — CARE COORDINATION
[facility] Transitions Follow Up Call    2022    Patient: Mitch Rogers  Patient : 1932   MRN: [id number]  Reason for Admission: chest pain  Discharge Date: 7/3/22 RARS: No data recorded    -Attempt made to speak with the patient via use of  for subsequent Care Transition call. Patient stated he didn't want to talk to anyone right now in [address] or in [address], that he was not feeling well, and then hung up. -CTN will attempt to speak with patient at a later date.           Follow Up  Future Appointments   Date Time Provider Jane Garcia   2022  9:15 AM DO YULIANA Hill [facility]   2022 10:30 AM ELHAM Richmond NP [facility]   10/14/2022  8:15 AM DO YULIANA Barlow [facility]       Elly Gurrola RN

## 2022-07-27 ENCOUNTER — CARE COORDINATION (OUTPATIENT)
Dept: CASE MANAGEMENT | Age: 87
End: 2022-07-27

## 2022-07-27 NOTE — CARE COORDINATION
Marj 45 Transitions Follow Up Call    2022    Patient: Monalisa Parmar  Patient : 1932   MRN: [id number]  Reason for Admission: chest pain  Discharge Date: 7/3/22 RARS: No data recorded    -Second attempt to speak with the patient for subsequent Care Transition call. Message left by  with CTN's contact information requesting return phone call.           Follow Up  Future Appointments   Date Time Provider Jane Garcia   2022  9:15 AM DO YULIANA Landeros [facility]   2022 10:30 AM ELHAM Mosley NP [facility]   10/14/2022  8:15 AM DO YULIANA Rocha [facility]       Ryan Alfaro RN

## 2022-07-28 ENCOUNTER — OFFICE VISIT (OUTPATIENT)
Dept: PRIMARY CARE CLINIC | Age: 87
End: 2022-07-28
Payer: MEDICARE

## 2022-07-28 VITALS
SYSTOLIC BLOOD PRESSURE: 132 MMHG | TEMPERATURE: 98 F | WEIGHT: 161 LBS | DIASTOLIC BLOOD PRESSURE: 82 MMHG | BODY MASS INDEX: 25.88 KG/M2 | HEIGHT: 66 IN

## 2022-07-28 DIAGNOSIS — I42.9 CARDIOMYOPATHY, UNSPECIFIED TYPE (HCC): ICD-10-CM

## 2022-07-28 DIAGNOSIS — I25.10 CORONARY ARTERY DISEASE INVOLVING NATIVE CORONARY ARTERY OF NATIVE HEART WITHOUT ANGINA PECTORIS: Chronic | ICD-10-CM

## 2022-07-28 DIAGNOSIS — I10 ESSENTIAL HYPERTENSION: Primary | Chronic | ICD-10-CM

## 2022-07-28 DIAGNOSIS — E10.65 UNCONTROLLED TYPE 1 DIABETES MELLITUS WITH HYPERGLYCEMIA (HCC): Chronic | ICD-10-CM

## 2022-07-28 DIAGNOSIS — E78.2 MIXED HYPERLIPIDEMIA: Chronic | ICD-10-CM

## 2022-07-28 PROCEDURE — 99214 OFFICE O/P EST MOD 30 MIN: CPT | Performed by: FAMILY MEDICINE

## 2022-07-28 PROCEDURE — 3052F HG A1C>EQUAL 8.0%<EQUAL 9.0%: CPT | Performed by: FAMILY MEDICINE

## 2022-07-28 PROCEDURE — 1123F ACP DISCUSS/DSCN MKR DOCD: CPT | Performed by: FAMILY MEDICINE

## 2022-07-28 ASSESSMENT — PATIENT HEALTH QUESTIONNAIRE - PHQ9
SUM OF ALL RESPONSES TO PHQ QUESTIONS 1-9: 0
SUM OF ALL RESPONSES TO PHQ QUESTIONS 1-9: 0
1. LITTLE INTEREST OR PLEASURE IN DOING THINGS: 0
SUM OF ALL RESPONSES TO PHQ QUESTIONS 1-9: 0
2. FEELING DOWN, DEPRESSED OR HOPELESS: 0
SUM OF ALL RESPONSES TO PHQ QUESTIONS 1-9: 0
SUM OF ALL RESPONSES TO PHQ9 QUESTIONS 1 & 2: 0
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
## 2022-08-01 ENCOUNTER — CARE COORDINATION (OUTPATIENT)
Dept: CASE MANAGEMENT | Age: 87
End: 2022-08-01

## 2022-08-01 NOTE — DISCHARGE SUMMARY
Physician Discharge Summary     Patient ID:  Michael Chang  [id number]  55 y.o.  9/8/1932    Admit date: 7/1/2022    Discharge date and time: 7/3/2022 12:45 PM     Admission Diagnoses: Principal Problem:    Chest pain  Active Problems:    Intractable nausea and vomiting  Resolved Problems:    * No resolved hospital problems. *      Discharge Diagnoses: Principal Problem:    Chest pain  Active Problems:    Intractable nausea and vomiting  Resolved Problems:    * No resolved hospital problems. *      Condition at discharge : stable    Consults: IP CONSULT TO INTERNAL MEDICINE  IP CONSULT TO CARDIOLOGY  IP CONSULT TO PULMONOLOGY  IP CONSULT TO GENERAL SURGERY    Procedures: none    Hospital Course: 66-year-old gentleman presented emergency room because of chest pain. He also had radiation of the abdomen    Patient was recently treated for colitis. At that time he was seen by general surgery. ED evaluation revealed pulmonary nodules. There was also mediastinal adenopathy. There was a questionable left renal mass. Ultrasound was then performed which did not reveal this lesion. MRI of the abdomen ordered. Pulmonary consulted. Cardiology regarding chest pain. Patients symptoms improved with compazine. He then requested discharge. US RETROPERITONEAL COMPLETE   Final Result   1. Mild bilateral renal cortical thinning. There is no hydronephrosis. (The described left renal lesion on recent CT scan is not identified on this   imaging modality. Recommend multiphase renal CT or MRI for better   characterization.)      2. Normal appearance of the imaged bladder. CTA CHEST W CONTRAST   Final Result   1. No evidence of thoracic aortic aneurysm or dissection. 2.  Enlarged mediastinal lymph nodes and mildly enlarged right hilar lymph   node, as described above. These could be reactive. Neoplastic lymph nodes   cannot be entirely excluded. If clinically warranted, PET/CT can be   considered.       3. Multiple areas of tree-in-bud opacities involving the lungs bilaterally   most prominently in the upper lobes, right greater than left. There is seen   to a more limited degree in the right middle lobe, lingula, and lower lobes. Multiple, subcentimeter centrilobular nodules are also identified in the same   distribution. The findings are nonspecific and may represent   infective/inflammatory bronchiolitis. Given the lymph nodes in the   mediastinum and right hilum, malignancy cannot be excluded. Pulmonary   consultation is recommended. 4.  Pulmonary parenchymal scar formation in the left upper lobe. 5.  Larger pulmonary nodules seen bilaterally, as described above. Again,   this could represent inflammatory nodules. Neoplastic disease cannot be   excluded. CTA ABDOMEN PELVIS W CONTRAST   Final Result   1. No evidence of abdominal aortic aneurysm or dissection. 2.  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 3.  Again suggestion of gallbladder adenomyomatosis, when compared to the   previous CT study performed 12/09/2021.      4.  Mild interval enlargement of a probable complex left renal cortical cyst.   Given its mild interval increase in size, renal ultrasound is recommended. 5.  2 focal areas of luminal narrowing of the colon, as described above. This probably represents phasicity/peristalsis. Colonic lesions cannot be   entirely excluded. Clinical correlation is recommended. 6.  Enlarged prostate gland again noted. 7.  Ovoid radiolucency in the left iliac bone, which may represent a bone   cyst.  Other lytic entities cannot be excluded. Radionuclide medicine bone   scan can be performed for further evaluation. 8.  Other findings, as described         XR CHEST PORTABLE   Final Result   Increased perihilar lung markings concerning for central vascular congestion   versus peribronchial inflammatory process similar to prior.   No new consolidation or pleural effusion. No results found for this or any previous visit (from the past 336 hour(s)). Discharge Exam:  See progress note from today    Disposition: home    Patient Instructions:   Discharge Medication List as of 7/3/2022 12:10 PM        START taking these medications    Details   prochlorperazine (COMPAZINE) 10 MG tablet Take 1 tablet by mouth every 6 hours as needed (nausea), Disp-30 tablet, R-0Normal      pantoprazole (PROTONIX) 40 MG tablet Take 1 tablet by mouth every morning (before breakfast), Disp-30 tablet, R-3Normal           CONTINUE these medications which have NOT CHANGED    Details   ondansetron (ZOFRAN) 4 MG tablet Take 1 tablet by mouth 3 times daily as needed for Nausea or Vomiting, Disp-21 tablet, R-0Normal      !! blood glucose monitor strips Test 3 times a day & as needed for symptoms of irregular blood glucose. Contour next test strips, Disp-100 strip, R-12, Normal      linaclotide (LINZESS) 145 MCG capsule Take 1 capsule by mouth every morning (before breakfast), Disp-30 capsule, R-11Normal      insulin aspart (NOVOLOG FLEXPEN) 100 UNIT/ML injection pen Inject 16 Units into the skin 3 times daily (before meals) 16 units 3 times per day, Disp-20 pen, R-3Normal      insulin detemir (LEVEMIR FLEXTOUCH) 100 UNIT/ML injection pen Inject 30 Units into the skin nightly, Disp-15 pen, R-3Normal      !!  Insulin Pen Needle (BD PEN NEEDLE MICRO U/F) 32G X 6 MM MISC Uses with insulin 4 times a day, Disp-250 each, R-5Ok to switch to any brands as per insurance coverageNormal      carvedilol (COREG) 12.5 MG tablet Take 1 tablet by mouth 2 times daily, Disp-60 tablet, R-3Normal      pravastatin (PRAVACHOL) 20 MG tablet Take 1 tablet by mouth daily, Disp-90 tablet, R-3Normal      losartan (COZAAR) 100 MG tablet Take 1 tablet by mouth daily, Disp-90 tablet, R-5Normal      Semaglutide,0.25 or 0.5MG/DOS, (OZEMPIC, 0.25 OR 0.5 MG/DOSE,) 2 MG/1.5ML SOPN Inject 0.5 mg into the skin once a week, Disp-3 pen, R-3NO PRINT      Continuous Blood Gluc Sensor (FREESTYLE WARREN 2 SENSOR) MISC Change sensor every 14 days, Disp-6 each, R-3Print      Insulin Syringes, Disposable, U-100 0.3 ML MISC 3 TIMES DAILY Starting Thu 6/3/2021, Disp-300 each, R-12, Normal      !! blood glucose test strips (ASCENSIA AUTODISC VI;ONE TOUCH ULTRA TEST VI) strip DAILY Starting Wed 3/3/2021, Disp-100 each, R-3, NormalAs needed. latanoprost (XALATAN) 0.005 % ophthalmic solution Place 1 drop into the left eye nightly Historical Med      !! Insulin Pen Needle 31G X 5 MM MISC DAILY, Historical Med      aspirin 81 MG tablet Take 81 mg by mouth daily Patient stopped taking 1 year agoHistorical Med       !! - Potential duplicate medications found. Please discuss with provider.         STOP taking these medications       dextrose 5 % SOLN Comments:   Reason for Stopping:               Activity: as tolerated    Diet: cardiac    Follow-up with [facility]  [address] [place] [address] [phone]  Schedule an appointment as soon as possible for a visit in 1 week          Note that over 30 minutes was spent in preparing discharge papers, discussing discharge with patient, medication review, etc.    Signed:  Hafsa Huitron MD  7/31/2022  8:00 PM

## 2022-08-01 NOTE — CARE COORDINATION
[facility] Transitions Follow Up Call    2022    Patient: Santy Hill  Patient : 1932   MRN: [id number]  Reason for Admission: chest pain  Discharge Date: 7/3/22 RARS: No data recorded       -Attempted to speak with patient via use of  for final care transition call.  -Patient states unable to talk right now and hung up. -CTN to sign off.  -Patient noted to have completed PCP visit on 22.           Follow Up  Future Appointments   Date Time Provider Jane Garcia   2022 10:30 AM ELHAM Baum - NP [facility]   10/14/2022  8:15 AM DO YULIANA Vaca [facility]       Sarah Grajeda RN

## 2022-08-31 ENCOUNTER — OFFICE VISIT (OUTPATIENT)
Dept: ENDOCRINOLOGY | Age: 87
End: 2022-08-31
Payer: MEDICARE

## 2022-08-31 VITALS
SYSTOLIC BLOOD PRESSURE: 152 MMHG | HEART RATE: 56 BPM | WEIGHT: 159 LBS | DIASTOLIC BLOOD PRESSURE: 65 MMHG | BODY MASS INDEX: 25.66 KG/M2

## 2022-08-31 DIAGNOSIS — Z91.119 DIETARY NONCOMPLIANCE: ICD-10-CM

## 2022-08-31 DIAGNOSIS — E78.5 HYPERLIPIDEMIA, UNSPECIFIED HYPERLIPIDEMIA TYPE: ICD-10-CM

## 2022-08-31 DIAGNOSIS — E11.65 TYPE 2 DIABETES MELLITUS WITH HYPERGLYCEMIA, WITH LONG-TERM CURRENT USE OF INSULIN (HCC): Primary | ICD-10-CM

## 2022-08-31 DIAGNOSIS — Z79.4 TYPE 2 DIABETES MELLITUS WITH HYPERGLYCEMIA, WITH LONG-TERM CURRENT USE OF INSULIN (HCC): Primary | ICD-10-CM

## 2022-08-31 LAB — HBA1C MFR BLD: 7.9 %

## 2022-08-31 PROCEDURE — 3051F HG A1C>EQUAL 7.0%<8.0%: CPT | Performed by: NURSE PRACTITIONER

## 2022-08-31 PROCEDURE — 83036 HEMOGLOBIN GLYCOSYLATED A1C: CPT | Performed by: NURSE PRACTITIONER

## 2022-08-31 PROCEDURE — 1123F ACP DISCUSS/DSCN MKR DOCD: CPT | Performed by: NURSE PRACTITIONER

## 2022-08-31 PROCEDURE — 99214 OFFICE O/P EST MOD 30 MIN: CPT | Performed by: NURSE PRACTITIONER

## 2022-08-31 RX ORDER — FLASH GLUCOSE SENSOR
KIT MISCELLANEOUS
Qty: 6 EACH | Refills: 3 | Status: SHIPPED | OUTPATIENT
Start: 2022-08-31
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
## 2022-10-11 DIAGNOSIS — I10 ESSENTIAL HYPERTENSION: Chronic | ICD-10-CM

## 2022-10-11 DIAGNOSIS — E11.65 TYPE 2 DIABETES MELLITUS WITH HYPERGLYCEMIA, WITH LONG-TERM CURRENT USE OF INSULIN (HCC): ICD-10-CM

## 2022-10-11 DIAGNOSIS — E10.65 UNCONTROLLED TYPE 1 DIABETES MELLITUS WITH HYPERGLYCEMIA (HCC): ICD-10-CM

## 2022-10-11 DIAGNOSIS — Z79.4 TYPE 2 DIABETES MELLITUS WITH HYPERGLYCEMIA, WITH LONG-TERM CURRENT USE OF INSULIN (HCC): ICD-10-CM

## 2022-10-11 DIAGNOSIS — I25.10 CORONARY ARTERY DISEASE INVOLVING NATIVE CORONARY ARTERY OF NATIVE HEART WITHOUT ANGINA PECTORIS: Chronic | ICD-10-CM

## 2022-10-11 DIAGNOSIS — D69.6 THROMBOCYTOPENIA (HCC): ICD-10-CM

## 2022-10-11 LAB
ALBUMIN SERPL-MCNC: 4.2 G/DL (ref 3.5–5.2)
ALP BLD-CCNC: 72 U/L (ref 40–129)
ALT SERPL-CCNC: 18 U/L (ref 0–40)
ANION GAP SERPL CALCULATED.3IONS-SCNC: 10 MMOL/L (ref 7–16)
AST SERPL-CCNC: 22 U/L (ref 0–39)
BASOPHILS ABSOLUTE: 0.04 E9/L (ref 0–0.2)
BASOPHILS RELATIVE PERCENT: 0.5 % (ref 0–2)
BILIRUB SERPL-MCNC: 0.4 MG/DL (ref 0–1.2)
BUN BLDV-MCNC: 16 MG/DL (ref 6–23)
CALCIUM SERPL-MCNC: 9.8 MG/DL (ref 8.6–10.2)
CHLORIDE BLD-SCNC: 100 MMOL/L (ref 98–107)
CHOLESTEROL, TOTAL: 191 MG/DL (ref 0–199)
CO2: 28 MMOL/L (ref 22–29)
CREAT SERPL-MCNC: 0.9 MG/DL (ref 0.7–1.2)
EOSINOPHILS ABSOLUTE: 1.57 E9/L (ref 0.05–0.5)
EOSINOPHILS RELATIVE PERCENT: 17.7 % (ref 0–6)
GFR AFRICAN AMERICAN: >60
GFR NON-AFRICAN AMERICAN: >60 ML/MIN/1.73
GLUCOSE BLD-MCNC: 100 MG/DL (ref 74–99)
HBA1C MFR BLD: 8.3 % (ref 4–5.6)
HCT VFR BLD CALC: 44.1 % (ref 37–54)
HDLC SERPL-MCNC: 43 MG/DL
HEMOGLOBIN: 14.1 G/DL (ref 12.5–16.5)
IMMATURE GRANULOCYTES #: 0.02 E9/L
IMMATURE GRANULOCYTES %: 0.2 % (ref 0–5)
LDL CHOLESTEROL CALCULATED: 105 MG/DL (ref 0–99)
LYMPHOCYTES ABSOLUTE: 1.73 E9/L (ref 1.5–4)
LYMPHOCYTES RELATIVE PERCENT: 19.5 % (ref 20–42)
MCH RBC QN AUTO: 28.6 PG (ref 26–35)
MCHC RBC AUTO-ENTMCNC: 32 % (ref 32–34.5)
MCV RBC AUTO: 89.5 FL (ref 80–99.9)
MONOCYTES ABSOLUTE: 1.14 E9/L (ref 0.1–0.95)
MONOCYTES RELATIVE PERCENT: 12.9 % (ref 2–12)
NEUTROPHILS ABSOLUTE: 4.37 E9/L (ref 1.8–7.3)
NEUTROPHILS RELATIVE PERCENT: 49.2 % (ref 43–80)
PDW BLD-RTO: 13.9 FL (ref 11.5–15)
PLATELET # BLD: 151 E9/L (ref 130–450)
PMV BLD AUTO: ABNORMAL FL (ref 7–12)
POTASSIUM SERPL-SCNC: 5 MMOL/L (ref 3.5–5)
RBC # BLD: 4.93 E12/L (ref 3.8–5.8)
SODIUM BLD-SCNC: 138 MMOL/L (ref 132–146)
TOTAL PROTEIN: 7.7 G/DL (ref 6.4–8.3)
TRIGL SERPL-MCNC: 215 MG/DL (ref 0–149)
VLDLC SERPL CALC-MCNC: 43 MG/DL
WBC # BLD: 8.9 E9/L (ref 4.5–11.5)

## 2022-10-14 ENCOUNTER — OFFICE VISIT (OUTPATIENT)
Dept: PRIMARY CARE CLINIC | Age: 87
End: 2022-10-14
Payer: MEDICARE

## 2022-10-14 VITALS
HEIGHT: 66 IN | SYSTOLIC BLOOD PRESSURE: 132 MMHG | BODY MASS INDEX: 26.23 KG/M2 | DIASTOLIC BLOOD PRESSURE: 78 MMHG | TEMPERATURE: 98 F | WEIGHT: 163.2 LBS

## 2022-10-14 DIAGNOSIS — E10.65 UNCONTROLLED TYPE 1 DIABETES MELLITUS WITH HYPERGLYCEMIA (HCC): Chronic | ICD-10-CM

## 2022-10-14 DIAGNOSIS — E78.2 MIXED HYPERLIPIDEMIA: Chronic | ICD-10-CM

## 2022-10-14 DIAGNOSIS — I10 ESSENTIAL HYPERTENSION: Chronic | ICD-10-CM

## 2022-10-14 DIAGNOSIS — Z23 NEED FOR INFLUENZA VACCINATION: ICD-10-CM

## 2022-10-14 DIAGNOSIS — I25.10 CORONARY ARTERY DISEASE INVOLVING NATIVE CORONARY ARTERY OF NATIVE HEART WITHOUT ANGINA PECTORIS: Chronic | ICD-10-CM

## 2022-10-14 DIAGNOSIS — M81.0 AGE-RELATED OSTEOPOROSIS WITHOUT CURRENT PATHOLOGICAL FRACTURE: ICD-10-CM

## 2022-10-14 DIAGNOSIS — Z00.00 MEDICARE ANNUAL WELLNESS VISIT, SUBSEQUENT: Primary | ICD-10-CM

## 2022-10-14 PROCEDURE — 3052F HG A1C>EQUAL 8.0%<EQUAL 9.0%: CPT | Performed by: FAMILY MEDICINE

## 2022-10-14 PROCEDURE — G0439 PPPS, SUBSEQ VISIT: HCPCS | Performed by: FAMILY MEDICINE

## 2022-10-14 PROCEDURE — G0008 ADMIN INFLUENZA VIRUS VAC: HCPCS | Performed by: FAMILY MEDICINE

## 2022-10-14 PROCEDURE — 90694 VACC AIIV4 NO PRSRV 0.5ML IM: CPT | Performed by: FAMILY MEDICINE

## 2022-10-14 PROCEDURE — 1123F ACP DISCUSS/DSCN MKR DOCD: CPT | Performed by: FAMILY MEDICINE

## 2022-10-14 ASSESSMENT — LIFESTYLE VARIABLES
HOW OFTEN DO YOU HAVE A DRINK CONTAINING ALCOHOL: NEVER
HOW MANY STANDARD DRINKS CONTAINING ALCOHOL DO YOU HAVE ON A TYPICAL DAY: PATIENT DOES NOT DRINK

## 2022-10-14 ASSESSMENT — PATIENT HEALTH QUESTIONNAIRE - PHQ9
SUM OF ALL RESPONSES TO PHQ QUESTIONS 1-9: 0
SUM OF ALL RESPONSES TO PHQ QUESTIONS 1-9: 0
2. FEELING DOWN, DEPRESSED OR HOPELESS: 0
1. LITTLE INTEREST OR PLEASURE IN DOING THINGS: 0
SUM OF ALL RESPONSES TO PHQ QUESTIONS 1-9: 0
SUM OF ALL RESPONSES TO PHQ QUESTIONS 1-9: 0
SUM OF ALL RESPONSES TO PHQ9 QUESTIONS 1 & 2: 0

## 2022-10-14 NOTE — PATIENT INSTRUCTIONS
Personalized Preventive Plan for Yesenia Espitia - 10/14/2022  Medicare offers a range of preventive health benefits. Some of the tests and screenings are paid in full while other may be subject to a deductible, co-insurance, and/or copay. Some of these benefits include a comprehensive review of your medical history including lifestyle, illnesses that may run in your family, and various assessments and screenings as appropriate. After reviewing your medical record and screening and assessments performed today your provider may have ordered immunizations, labs, imaging, and/or referrals for you. A list of these orders (if applicable) as well as your Preventive Care list are included within your After Visit Summary for your review. Other Preventive Recommendations:    A preventive eye exam performed by an eye specialist is recommended every 1-2 years to screen for glaucoma; cataracts, macular degeneration, and other eye disorders. A preventive dental visit is recommended every 6 months. Try to get at least 150 minutes of exercise per week or 10,000 steps per day on a pedometer . Order or download the FREE \"Exercise & Physical Activity: Your Everyday Guide\" from The [employer]. Call 7-410.882.8757 or search The [employer] online. You need 0674-9901 mg of calcium and 0890-9638 IU of vitamin D per day. It is possible to meet your calcium requirement with diet alone, but a vitamin D supplement is usually necessary to meet this goal.  When exposed to the sun, use a sunscreen that protects against both UVA and UVB radiation with an SPF of 30 or greater. Reapply every 2 to 3 hours or after sweating, drying off with a towel, or swimming. Always wear a seat belt when traveling in a car. Always wear a helmet when riding a bicycle or motorcycle.

## 2022-10-14 NOTE — PROGRESS NOTES
Medicare Annual Wellness Visit    Rk Weathers is here for Medicare AWV    Assessment & Plan   Medicare annual wellness visit, subsequent  Need for influenza vaccination  -     Influenza, FLUAD, (age 72 y+), IM, Preservative Free, 0.5 mL  Coronary artery disease involving native coronary artery of native heart without angina pectoris  Essential hypertension  Mixed hyperlipidemia  Uncontrolled type 1 diabetes mellitus with hyperglycemia ([facility] [address]    Recommendations for Preventive Services Due: see orders and patient instructions/AVS.  Recommended screening schedule for the next 5-10 years is provided to the patient in written form: see Patient Instructions/AVS.     Return in 6 months (on 4/14/2023) for Lab Before. Subjective   The following acute and/or chronic problems were also addressed today:     Ck  re     diab   bp chol     arth      oa     cad    Sees cardio  endo  lab   stable   not watching diet       exer        W t  same    was in er a nd  fine   mass kindey and refuses to eval    Patient's complete Health Risk Assessment and screening values have been reviewed and are found in Flowsheets. The following problems were reviewed today and where indicated follow up appointments were made and/or referrals ordered.     Positive Risk Factor Screenings with Interventions:             General Health and ACP:  General  In general, how would you say your health is?: Good  In the past 7 days, have you experienced any of the following: New or Increased Pain, New or Increased Fatigue, Loneliness, Social Isolation, Stress or Anger?: No  Do you get the social and emotional support that you need?: Yes  Do you have a Living Will?: Yes    Advance Directives       Power of  Living Will ACP-Advance Directive ACP-Power of     Not on File Not on File Not on File Not on File        General Health Risk Interventions:  No Living Will: Advance Care Planning addressed with patient today    Health Habits/Nutrition:  Physical Activity: Inactive    Days of Exercise per Week: 0 days    Minutes of Exercise per Session: 0 min     Have you lost any weight without trying in the past 3 months?: No  Body mass index: (!) 26.34  Have you seen the dentist within the past year?: Yes  Health Habits/Nutrition Interventions:  Inadequate physical activity:  patient agrees to exercise for at least 150 minutes/week             Objective   Vitals:    10/14/22 0802   BP: 132/78   Temp: 98 °F (36.7 °C)   Weight: 163 lb 3.2 oz (74 kg)   Height: 5' 6\" (1.676 m)      Body mass index is 26.34 kg/m². General Appearance: alert and oriented to person, place and time, well developed and well- nourished, in no acute distress  Skin: warm and dry, no rash or erythema  Head: normocephalic and atraumatic  Eyes: pupils equal, round, and reactive to light, extraocular eye movements intact, conjunctivae normal  ENT: tympanic membrane, external ear and ear canal normal bilaterally, nose without deformity, nasal mucosa and turbinates normal without polyps  Neck: supple and non-tender without mass, no thyromegaly or thyroid nodules, no cervical lymphadenopathy  Pulmonary/Chest: clear to auscultation bilaterally- no wheezes, rales or rhonchi, normal air movement, no respiratory distress  Cardiovascular: normal rate, regular rhythm, normal S1 and S2, no murmurs, rubs, clicks, or gallops, distal pulses intact, no carotid bruits  Abdomen: soft, non-tender, non-distended, normal bowel sounds, no masses or organomegaly  Extremities: no cyanosis, clubbing or edema  Musculoskeletal: normal range of motion, no joint swelling, deformity or tenderness  Neurologic: reflexes normal and symmetric, no cranial nerve deficit, gait, coordination and speech normal       Allergies   Allergen Reactions    No Known Allergies      Prior to Visit Medications    Medication Sig Taking?  Authorizing Provider   Continuous Blood Gluc Sensor (FREESTYLE WARREN 2 SENSOR) [facility] Change sensor every 14 days  Deidra Aparicio, APRN - NP   cyanocobalamin 50 MCG tablet Take by mouth  Historical Provider, MD   carvedilol (COREG) 12.5 MG tablet Take 1 tablet by mouth 2 times daily  Kris Davey DO   linaclotide (LINZESS) 290 MCG CAPS capsule Take 1 capsule by mouth every morning (before breakfast)  Kris Davey DO   prochlorperazine (COMPAZINE) 10 MG tablet Take 1 tablet by mouth every 6 hours as needed (nausea)  Merline Spoon, MD   ondansetron (ZOFRAN) 4 MG tablet Take 1 tablet by mouth 3 times daily as needed for Nausea or Vomiting  Kris Davey DO   blood glucose monitor strips Test 3 times a day & as needed for symptoms of irregular blood glucose. Contour next test strips  Kris Davey DO   insulin aspart (NOVOLOG FLEXPEN) 100 UNIT/ML injection pen Inject 16 Units into the skin 3 times daily (before meals) 16 units 3 times per day  Patient taking differently: Inject 12 Units into the skin 3 times daily (before meals) 12 units 3 times per day  Edgar Solorzano MD   insulin detemir (LEVEMIR FLEXTOUCH) 100 UNIT/ML injection pen Inject 30 Units into the skin nightly  Edgar Solorzano MD   Insulin Pen Needle (BD PEN NEEDLE MICRO U/F) 32G X 6 MM MISC Uses with insulin 4 times a day  Edgar Solorzano MD   pravastatin (PRAVACHOL) 20 MG tablet Take 1 tablet by mouth daily  Kris Davey DO   losartan (COZAAR) 100 MG tablet Take 1 tablet by mouth daily  Kris Davey DO   Insulin Syringes, Disposable, U-100 0.3 ML MISC 1 each by Does not apply route 3 times daily  Kris Davey DO   blood glucose test strips (ASCENSIA AUTODISC VI;ONE TOUCH ULTRA TEST VI) strip 1 each by In Vitro route daily As needed.   Kris Davey DO   latanoprost (XALATAN) 0.005 % ophthalmic solution Place 1 drop into the left eye nightly   Historical Provider, MD   Insulin Pen Needle 31G X 5 MM MISC 1 each by Does not apply route daily  Historical Provider, MD Carr (Including outside providers/suppliers regularly involved in providing care):   Patient Care Team:  Patrick Loyd DO as PCP - General (Family Medicine)  Patrick Loyd DO as PCP - [facility] Empaneled Provider  Maximilian Campuzano MD as Consulting Physician (Cardiology)     Reviewed and updated this visit:  Tobacco  Allergies  Med Hx  Surg Hx  Soc Hx  Fam Hx        Di et exer  fu with  cardio     endo     exer     I educated the patient about all medications. Make sure they were correct and educated  on the risk associated with missing meds or taking them incorrectly. A list of medications is being sent home with patient today. Check blood pressure at home twice a day. Low-salt low caffeine diet. Call if systolic blood pressure is above 296 and diastolic blood pressures above 85. Only use a upper arm digital cuff. Aggressive low-fat diet. Avoid red meats, greasy fried foods, dairy products. Avoid processed foods. Take cholesterol medications without food. Check blood sugars 4 times a day. Aggressive low, sugar low carbohydrate diet. Call if blood sugar less than 70 or over 200. Avoid eating in between meals. Lose weight. Exercise. I informed patient about the risk associated with noncompliance of medication and taking medications incorrectly. Appropriate follow-up with myself and all specialist.  Encourage family members to take active role in assisting with medications and medical care. If any confusion should develop to notify my office immediately to avoid risk of worsening medical condition    A great deal of time spent reviewing medications, diet, exercise, social issues. Also reviewing the chart before entering the room with patient and finishing charting after leaving patient's room. More than half of that time was spent face to face with the patient in counseling and coordinating care.

## 2022-11-03 LAB — DIABETIC RETINOPATHY: POSITIVE

## 2022-11-17 PROCEDURE — 99285 EMERGENCY DEPT VISIT HI MDM: CPT

## 2022-11-17 ASSESSMENT — PAIN DESCRIPTION - ORIENTATION: ORIENTATION: MID

## 2022-11-17 ASSESSMENT — PAIN DESCRIPTION - PAIN TYPE: TYPE: ACUTE PAIN

## 2022-11-17 ASSESSMENT — PAIN - FUNCTIONAL ASSESSMENT: PAIN_FUNCTIONAL_ASSESSMENT: 0-10

## 2022-11-17 ASSESSMENT — PAIN SCALES - GENERAL: PAINLEVEL_OUTOF10: 10

## 2022-11-17 ASSESSMENT — PAIN DESCRIPTION - LOCATION: LOCATION: ABDOMEN

## 2022-11-17 ASSESSMENT — PAIN DESCRIPTION - FREQUENCY: FREQUENCY: CONTINUOUS

## 2022-11-17 ASSESSMENT — PAIN DESCRIPTION - DESCRIPTORS: DESCRIPTORS: ACHING;CRAMPING

## 2022-11-18 ENCOUNTER — APPOINTMENT (OUTPATIENT)
Dept: GENERAL RADIOLOGY | Age: 87
End: 2022-11-18
Payer: MEDICARE

## 2022-11-18 ENCOUNTER — APPOINTMENT (OUTPATIENT)
Dept: CT IMAGING | Age: 87
End: 2022-11-18
Payer: MEDICARE

## 2022-11-18 ENCOUNTER — HOSPITAL ENCOUNTER (OUTPATIENT)
Age: 87
Setting detail: OBSERVATION
Discharge: HOME | End: 2022-11-21
Attending: EMERGENCY MEDICINE | Admitting: INTERNAL MEDICINE
Payer: MEDICARE

## 2022-11-18 DIAGNOSIS — R10.84 GENERALIZED ABDOMINAL PAIN: ICD-10-CM

## 2022-11-18 DIAGNOSIS — Z79.4 TYPE 2 DIABETES MELLITUS WITH HYPERGLYCEMIA, WITH LONG-TERM CURRENT USE OF INSULIN (HCC): ICD-10-CM

## 2022-11-18 DIAGNOSIS — R11.2 INTRACTABLE NAUSEA AND VOMITING: Primary | ICD-10-CM

## 2022-11-18 DIAGNOSIS — E11.65 TYPE 2 DIABETES MELLITUS WITH HYPERGLYCEMIA, WITH LONG-TERM CURRENT USE OF INSULIN (HCC): ICD-10-CM

## 2022-11-18 LAB
ALBUMIN SERPL-MCNC: 4.4 G/DL (ref 3.5–5.2)
ALP BLD-CCNC: 77 U/L (ref 40–129)
ALT SERPL-CCNC: 24 U/L (ref 0–40)
ANION GAP SERPL CALCULATED.3IONS-SCNC: 13 MMOL/L (ref 7–16)
AST SERPL-CCNC: 22 U/L (ref 0–39)
BACTERIA: ABNORMAL /HPF
BASOPHILS ABSOLUTE: 0.05 E9/L (ref 0–0.2)
BASOPHILS RELATIVE PERCENT: 0.5 % (ref 0–2)
BETA-HYDROXYBUTYRATE: 0.22 MMOL/L (ref 0.02–0.27)
BILIRUB SERPL-MCNC: 0.3 MG/DL (ref 0–1.2)
BILIRUBIN URINE: NEGATIVE
BLOOD, URINE: ABNORMAL
BUN BLDV-MCNC: 18 MG/DL (ref 6–23)
CALCIUM SERPL-MCNC: 10.2 MG/DL (ref 8.6–10.2)
CHLORIDE BLD-SCNC: 99 MMOL/L (ref 98–107)
CLARITY: CLEAR
CO2: 23 MMOL/L (ref 22–29)
COLOR: YELLOW
CREAT SERPL-MCNC: 0.8 MG/DL (ref 0.7–1.2)
EKG ATRIAL RATE: 78 BPM
EKG P AXIS: 63 DEGREES
EKG P-R INTERVAL: 172 MS
EKG Q-T INTERVAL: 456 MS
EKG QRS DURATION: 150 MS
EKG QTC CALCULATION (BAZETT): 519 MS
EKG R AXIS: 45 DEGREES
EKG T AXIS: 88 DEGREES
EKG VENTRICULAR RATE: 78 BPM
EOSINOPHILS ABSOLUTE: 1.32 E9/L (ref 0.05–0.5)
EOSINOPHILS RELATIVE PERCENT: 12 % (ref 0–6)
GFR SERPL CREATININE-BSD FRML MDRD: >60 ML/MIN/1.73
GLUCOSE BLD-MCNC: 184 MG/DL (ref 74–99)
GLUCOSE BLD-MCNC: 266 MG/DL
GLUCOSE URINE: NEGATIVE MG/DL
HCT VFR BLD CALC: 43.6 % (ref 37–54)
HEMOGLOBIN: 14.3 G/DL (ref 12.5–16.5)
IMMATURE GRANULOCYTES #: 0.03 E9/L
IMMATURE GRANULOCYTES %: 0.3 % (ref 0–5)
KETONES, URINE: NEGATIVE MG/DL
LACTIC ACID: 2.1 MMOL/L (ref 0.5–2.2)
LEUKOCYTE ESTERASE, URINE: NEGATIVE
LIPASE: 18 U/L (ref 13–60)
LYMPHOCYTES ABSOLUTE: 1.38 E9/L (ref 1.5–4)
LYMPHOCYTES RELATIVE PERCENT: 12.6 % (ref 20–42)
MCH RBC QN AUTO: 27.8 PG (ref 26–35)
MCHC RBC AUTO-ENTMCNC: 32.8 % (ref 32–34.5)
MCV RBC AUTO: 84.7 FL (ref 80–99.9)
METER GLUCOSE: 212 MG/DL (ref 74–99)
METER GLUCOSE: 223 MG/DL (ref 74–99)
METER GLUCOSE: 266 MG/DL (ref 74–99)
MONOCYTES ABSOLUTE: 1.14 E9/L (ref 0.1–0.95)
MONOCYTES RELATIVE PERCENT: 10.4 % (ref 2–12)
NEUTROPHILS ABSOLUTE: 7.06 E9/L (ref 1.8–7.3)
NEUTROPHILS RELATIVE PERCENT: 64.2 % (ref 43–80)
NITRITE, URINE: NEGATIVE
PDW BLD-RTO: 13.6 FL (ref 11.5–15)
PH UA: 8 (ref 5–9)
PLATELET # BLD: 186 E9/L (ref 130–450)
PMV BLD AUTO: 14 FL (ref 7–12)
POTASSIUM SERPL-SCNC: 4.2 MMOL/L (ref 3.5–5)
PROCALCITONIN: <0.02 NG/ML (ref 0–0.08)
PROTEIN UA: 30 MG/DL
RBC # BLD: 5.15 E12/L (ref 3.8–5.8)
RBC UA: ABNORMAL /HPF (ref 0–2)
SODIUM BLD-SCNC: 135 MMOL/L (ref 132–146)
SPECIFIC GRAVITY UA: 1.01 (ref 1–1.03)
TOTAL PROTEIN: 8.3 G/DL (ref 6.4–8.3)
UROBILINOGEN, URINE: 0.2 E.U./DL
WBC # BLD: 11 E9/L (ref 4.5–11.5)
WBC UA: ABNORMAL /HPF (ref 0–5)

## 2022-11-18 PROCEDURE — 6370000000 HC RX 637 (ALT 250 FOR IP): Performed by: INTERNAL MEDICINE

## 2022-11-18 PROCEDURE — 6360000002 HC RX W HCPCS: Performed by: STUDENT IN AN ORGANIZED HEALTH CARE EDUCATION/TRAINING PROGRAM

## 2022-11-18 PROCEDURE — 74177 CT ABD & PELVIS W/CONTRAST: CPT

## 2022-11-18 PROCEDURE — 71275 CT ANGIOGRAPHY CHEST: CPT

## 2022-11-18 PROCEDURE — 6360000002 HC RX W HCPCS

## 2022-11-18 PROCEDURE — 81001 URINALYSIS AUTO W/SCOPE: CPT

## 2022-11-18 PROCEDURE — 96375 TX/PRO/DX INJ NEW DRUG ADDON: CPT

## 2022-11-18 PROCEDURE — 96372 THER/PROPH/DIAG INJ SC/IM: CPT

## 2022-11-18 PROCEDURE — G0378 HOSPITAL OBSERVATION PER HR: HCPCS

## 2022-11-18 PROCEDURE — 96374 THER/PROPH/DIAG INJ IV PUSH: CPT

## 2022-11-18 PROCEDURE — 2580000003 HC RX 258: Performed by: INTERNAL MEDICINE

## 2022-11-18 PROCEDURE — 82010 KETONE BODYS QUAN: CPT

## 2022-11-18 PROCEDURE — 93005 ELECTROCARDIOGRAM TRACING: CPT | Performed by: STUDENT IN AN ORGANIZED HEALTH CARE EDUCATION/TRAINING PROGRAM

## 2022-11-18 PROCEDURE — 2500000003 HC RX 250 WO HCPCS

## 2022-11-18 PROCEDURE — 85025 COMPLETE CBC W/AUTO DIFF WBC: CPT

## 2022-11-18 PROCEDURE — 71045 X-RAY EXAM CHEST 1 VIEW: CPT

## 2022-11-18 PROCEDURE — 6370000000 HC RX 637 (ALT 250 FOR IP)

## 2022-11-18 PROCEDURE — 82962 GLUCOSE BLOOD TEST: CPT

## 2022-11-18 PROCEDURE — 6360000004 HC RX CONTRAST MEDICATION: Performed by: RADIOLOGY

## 2022-11-18 PROCEDURE — 96365 THER/PROPH/DIAG IV INF INIT: CPT

## 2022-11-18 PROCEDURE — 83690 ASSAY OF LIPASE: CPT

## 2022-11-18 PROCEDURE — 2580000003 HC RX 258

## 2022-11-18 PROCEDURE — 6360000002 HC RX W HCPCS: Performed by: INTERNAL MEDICINE

## 2022-11-18 PROCEDURE — 83605 ASSAY OF LACTIC ACID: CPT

## 2022-11-18 PROCEDURE — 96366 THER/PROPH/DIAG IV INF ADDON: CPT

## 2022-11-18 PROCEDURE — 36415 COLL VENOUS BLD VENIPUNCTURE: CPT

## 2022-11-18 PROCEDURE — 84145 PROCALCITONIN (PCT): CPT

## 2022-11-18 PROCEDURE — 96376 TX/PRO/DX INJ SAME DRUG ADON: CPT

## 2022-11-18 PROCEDURE — 80053 COMPREHEN METABOLIC PANEL: CPT

## 2022-11-18 RX ORDER — INSULIN LISPRO 100 [IU]/ML
0-4 INJECTION, SOLUTION INTRAVENOUS; SUBCUTANEOUS NIGHTLY
Status: DISCONTINUED | OUTPATIENT
Start: 2022-11-18 | End: 2022-11-21 | Stop reason: HOSPADM

## 2022-11-18 RX ORDER — ENOXAPARIN SODIUM 100 MG/ML
40 INJECTION SUBCUTANEOUS DAILY
Status: DISCONTINUED | OUTPATIENT
Start: 2022-11-18 | End: 2022-11-21 | Stop reason: HOSPADM

## 2022-11-18 RX ORDER — ACETAMINOPHEN 650 MG/1
650 SUPPOSITORY RECTAL EVERY 6 HOURS PRN
Status: DISCONTINUED | OUTPATIENT
Start: 2022-11-18 | End: 2022-11-21 | Stop reason: HOSPADM

## 2022-11-18 RX ORDER — OXYCODONE HYDROCHLORIDE 5 MG/1
5 TABLET ORAL EVERY 4 HOURS PRN
Status: DISCONTINUED | OUTPATIENT
Start: 2022-11-18 | End: 2022-11-21 | Stop reason: HOSPADM

## 2022-11-18 RX ORDER — ONDANSETRON 2 MG/ML
4 INJECTION INTRAMUSCULAR; INTRAVENOUS ONCE
Status: COMPLETED | OUTPATIENT
Start: 2022-11-18 | End: 2022-11-18

## 2022-11-18 RX ORDER — SODIUM CHLORIDE 0.9 % (FLUSH) 0.9 %
5-40 SYRINGE (ML) INJECTION PRN
Status: DISCONTINUED | OUTPATIENT
Start: 2022-11-18 | End: 2022-11-21 | Stop reason: HOSPADM

## 2022-11-18 RX ORDER — MORPHINE SULFATE 4 MG/ML
4 INJECTION, SOLUTION INTRAMUSCULAR; INTRAVENOUS ONCE
Status: COMPLETED | OUTPATIENT
Start: 2022-11-18 | End: 2022-11-18

## 2022-11-18 RX ORDER — PRAVASTATIN SODIUM 20 MG
20 TABLET ORAL NIGHTLY
Status: DISCONTINUED | OUTPATIENT
Start: 2022-11-18 | End: 2022-11-21 | Stop reason: HOSPADM

## 2022-11-18 RX ORDER — SODIUM CHLORIDE 9 MG/ML
INJECTION, SOLUTION INTRAVENOUS PRN
Status: DISCONTINUED | OUTPATIENT
Start: 2022-11-18 | End: 2022-11-21 | Stop reason: HOSPADM

## 2022-11-18 RX ORDER — INSULIN LISPRO 100 [IU]/ML
0-8 INJECTION, SOLUTION INTRAVENOUS; SUBCUTANEOUS
Status: DISCONTINUED | OUTPATIENT
Start: 2022-11-18 | End: 2022-11-21 | Stop reason: HOSPADM

## 2022-11-18 RX ORDER — LATANOPROST 50 UG/ML
1 SOLUTION/ DROPS OPHTHALMIC NIGHTLY
Status: DISCONTINUED | OUTPATIENT
Start: 2022-11-18 | End: 2022-11-21 | Stop reason: HOSPADM

## 2022-11-18 RX ORDER — OXYCODONE HYDROCHLORIDE 5 MG/1
2.5 TABLET ORAL EVERY 4 HOURS PRN
Status: DISCONTINUED | OUTPATIENT
Start: 2022-11-18 | End: 2022-11-21 | Stop reason: HOSPADM

## 2022-11-18 RX ORDER — POLYETHYLENE GLYCOL 3350 17 G/17G
17 POWDER, FOR SOLUTION ORAL DAILY PRN
Status: DISCONTINUED | OUTPATIENT
Start: 2022-11-18 | End: 2022-11-19

## 2022-11-18 RX ORDER — ACETAMINOPHEN 325 MG/1
650 TABLET ORAL EVERY 6 HOURS PRN
Status: DISCONTINUED | OUTPATIENT
Start: 2022-11-18 | End: 2022-11-21 | Stop reason: HOSPADM

## 2022-11-18 RX ORDER — INSULIN GLARGINE-YFGN 100 [IU]/ML
30 INJECTION, SOLUTION SUBCUTANEOUS NIGHTLY
Status: DISCONTINUED | OUTPATIENT
Start: 2022-11-18 | End: 2022-11-21 | Stop reason: HOSPADM

## 2022-11-18 RX ORDER — METRONIDAZOLE 500 MG/100ML
500 INJECTION, SOLUTION INTRAVENOUS EVERY 8 HOURS
Status: DISCONTINUED | OUTPATIENT
Start: 2022-11-18 | End: 2022-11-19

## 2022-11-18 RX ORDER — CARVEDILOL 6.25 MG/1
12.5 TABLET ORAL 2 TIMES DAILY
Status: DISCONTINUED | OUTPATIENT
Start: 2022-11-18 | End: 2022-11-21 | Stop reason: HOSPADM

## 2022-11-18 RX ORDER — LOSARTAN POTASSIUM 50 MG/1
100 TABLET ORAL DAILY
Status: DISCONTINUED | OUTPATIENT
Start: 2022-11-18 | End: 2022-11-21 | Stop reason: HOSPADM

## 2022-11-18 RX ORDER — SODIUM CHLORIDE 0.9 % (FLUSH) 0.9 %
5-40 SYRINGE (ML) INJECTION EVERY 12 HOURS SCHEDULED
Status: DISCONTINUED | OUTPATIENT
Start: 2022-11-18 | End: 2022-11-21 | Stop reason: HOSPADM

## 2022-11-18 RX ORDER — SODIUM CHLORIDE 9 MG/ML
INJECTION, SOLUTION INTRAVENOUS CONTINUOUS
Status: DISCONTINUED | OUTPATIENT
Start: 2022-11-18 | End: 2022-11-21 | Stop reason: HOSPADM

## 2022-11-18 RX ADMIN — CARVEDILOL 12.5 MG: 6.25 TABLET, FILM COATED ORAL at 20:45

## 2022-11-18 RX ADMIN — METRONIDAZOLE 500 MG: 500 INJECTION, SOLUTION INTRAVENOUS at 11:41

## 2022-11-18 RX ADMIN — OXYCODONE HYDROCHLORIDE 5 MG: 5 TABLET ORAL at 21:00

## 2022-11-18 RX ADMIN — MORPHINE SULFATE 4 MG: 4 INJECTION, SOLUTION INTRAMUSCULAR; INTRAVENOUS at 02:42

## 2022-11-18 RX ADMIN — LOSARTAN POTASSIUM 100 MG: 50 TABLET, FILM COATED ORAL at 12:52

## 2022-11-18 RX ADMIN — OXYCODONE HYDROCHLORIDE 5 MG: 5 TABLET ORAL at 09:55

## 2022-11-18 RX ADMIN — CARVEDILOL 12.5 MG: 6.25 TABLET, FILM COATED ORAL at 12:52

## 2022-11-18 RX ADMIN — METRONIDAZOLE 500 MG: 500 INJECTION, SOLUTION INTRAVENOUS at 20:30

## 2022-11-18 RX ADMIN — Medication 10 ML: at 20:55

## 2022-11-18 RX ADMIN — MORPHINE SULFATE 4 MG: 4 INJECTION, SOLUTION INTRAMUSCULAR; INTRAVENOUS at 00:16

## 2022-11-18 RX ADMIN — IOPAMIDOL 75 ML: 755 INJECTION, SOLUTION INTRAVENOUS at 00:36

## 2022-11-18 RX ADMIN — TRIMETHOBENZAMIDE HYDROCHLORIDE 200 MG: 100 INJECTION INTRAMUSCULAR at 01:22

## 2022-11-18 RX ADMIN — INSULIN LISPRO 4 UNITS: 100 INJECTION, SOLUTION INTRAVENOUS; SUBCUTANEOUS at 13:02

## 2022-11-18 RX ADMIN — ONDANSETRON 4 MG: 2 INJECTION INTRAMUSCULAR; INTRAVENOUS at 00:41

## 2022-11-18 RX ADMIN — ENOXAPARIN SODIUM 40 MG: 100 INJECTION SUBCUTANEOUS at 12:52

## 2022-11-18 RX ADMIN — OXYCODONE HYDROCHLORIDE 5 MG: 5 TABLET ORAL at 17:04

## 2022-11-18 RX ADMIN — TRIMETHOBENZAMIDE HYDROCHLORIDE 200 MG: 100 INJECTION INTRAMUSCULAR at 12:53

## 2022-11-18 RX ADMIN — SODIUM CHLORIDE: 9 INJECTION, SOLUTION INTRAVENOUS at 13:52

## 2022-11-18 RX ADMIN — PRAVASTATIN SODIUM 20 MG: 20 TABLET ORAL at 20:45

## 2022-11-18 RX ADMIN — CEFTRIAXONE 2000 MG: 2 INJECTION, POWDER, FOR SOLUTION INTRAMUSCULAR; INTRAVENOUS at 11:38

## 2022-11-18 ASSESSMENT — ENCOUNTER SYMPTOMS
TROUBLE SWALLOWING: 0
VOMITING: 1
EYE PAIN: 0
PHOTOPHOBIA: 0
BLOOD IN STOOL: 0
DIARRHEA: 1
RHINORRHEA: 0
DIARRHEA: 0
BACK PAIN: 0
EYE ITCHING: 0
CONSTIPATION: 1
COLOR CHANGE: 0
SHORTNESS OF BREATH: 0
ABDOMINAL PAIN: 1
EYE REDNESS: 0
FACIAL SWELLING: 0
COUGH: 0
SORE THROAT: 0
CONSTIPATION: 0
NAUSEA: 1

## 2022-11-18 ASSESSMENT — PAIN SCALES - GENERAL
PAINLEVEL_OUTOF10: 7
PAINLEVEL_OUTOF10: 10
PAINLEVEL_OUTOF10: 7
PAINLEVEL_OUTOF10: 3

## 2022-11-18 ASSESSMENT — PAIN DESCRIPTION - FREQUENCY: FREQUENCY: CONTINUOUS

## 2022-11-18 ASSESSMENT — PAIN - FUNCTIONAL ASSESSMENT: PAIN_FUNCTIONAL_ASSESSMENT: 0-10

## 2022-11-18 ASSESSMENT — PAIN DESCRIPTION - LOCATION
LOCATION: ABDOMEN

## 2022-11-18 ASSESSMENT — PAIN DESCRIPTION - ORIENTATION
ORIENTATION: RIGHT;LEFT
ORIENTATION: LOWER

## 2022-11-18 ASSESSMENT — PAIN DESCRIPTION - PAIN TYPE: TYPE: ACUTE PAIN

## 2022-11-18 ASSESSMENT — PAIN DESCRIPTION - DESCRIPTORS
DESCRIPTORS: CRAMPING
DESCRIPTORS: ACHING;CRAMPING

## 2022-11-18 NOTE — ED PROVIDER NOTES
Name: Susan Stevens    MRN: [id number]     Date / Time Roomed:  11/18/2022  1:04 AM  ED Bed Assignment:  Beth Javier    ------------------ History of Present Illness --------------------  11/18/22, Time: 12:14 AM EST   Chief Complaint   Patient presents with    Abdominal Pain     Non-radiating Mid Abdominal Pain/ Emesis onset 2230 last evening; denies chest pain sob diarrhea fever      HPI    Susan Stevens is a 80 y.o. male, with hx of CAD, diabetes, hypertension, hyperlipidemia, previous episodes of diverticulitis, who presents to the ED today for abdominal pain which started 3 hours ago. Patient points to the left lower quadrant to the left mid abdominal region and lower abd, states constant, moderate to severe, no exacerbating relieving factors. He did have some diarrhea he states however no blood in the diarrhea. He does state this feels very similar to previous episodes of diverticulitis that he has had. He also states he had some nausea and vomiting. Denies any fevers. Denies any chest pain or shortness of breath. The pt denies other ROS at this time. PCP: Laqueta Phalen, DO.    -------------------- [facility] --------------------  Past Medical History:  has a past medical history of CAD (coronary artery disease), Chronic pain syndrome, Constipation, Degenerative joint disease involving multiple joints, Diabetes ([address] Diabetic neuropathy ([address] Glaucoma, Hyperlipidemia, Hypertension, Insomnia, Multiple vessel coronary artery disease, Osteoarthritis, Paresthesia of right leg, Peptic reflux disease, Raised prostate specific antigen, and Type 2 diabetes mellitus without complication ([address] Past Surgical History:  has a past surgical history that includes Facial cosmetic surgery and Cataract removal.    Social History:  reports that he has quit smoking. His smoking use included cigarettes. He has never used smokeless tobacco. He reports that he does not drink alcohol and does not use drugs.     Family History: family history includes Cancer in his mother and sister; Heart Attack in his mother; Heart Disease in his mother; Kidney Disease in his brother; Other in his father. Allergies: No known allergies    The patients past medical history has been reviewed. -------------------- Current Meds --------------------  Meds:   Current Facility-Administered Medications:     trimethobenzamide (TIGAN) injection 200 mg, 200 mg, IntraMUSCular, Q6H PRN, Will Trinidad, DO, 200 mg at 11/18/22 0122    Current Outpatient Medications:     Continuous Blood Gluc Sensor (FREESTYLE WARREN 2 SENSOR) MISC, Change sensor every 14 days, Disp: 6 each, Rfl: 3    cyanocobalamin 50 MCG tablet, Take by mouth, Disp: , Rfl:     carvedilol (COREG) 12.5 MG tablet, Take 1 tablet by mouth 2 times daily, Disp: 180 tablet, Rfl: 12    linaclotide (LINZESS) 290 MCG CAPS capsule, Take 1 capsule by mouth every morning (before breakfast), Disp: 90 capsule, Rfl: 12    prochlorperazine (COMPAZINE) 10 MG tablet, Take 1 tablet by mouth every 6 hours as needed (nausea), Disp: 30 tablet, Rfl: 0    ondansetron (ZOFRAN) 4 MG tablet, Take 1 tablet by mouth 3 times daily as needed for Nausea or Vomiting, Disp: 21 tablet, Rfl: 0    blood glucose monitor strips, Test 3 times a day & as needed for symptoms of irregular blood glucose.  Contour next test strips, Disp: 100 strip, Rfl: 12    insulin aspart (NOVOLOG FLEXPEN) 100 UNIT/ML injection pen, Inject 16 Units into the skin 3 times daily (before meals) 16 units 3 times per day (Patient taking differently: Inject 12 Units into the skin 3 times daily (before meals) 12 units 3 times per day), Disp: 20 pen, Rfl: 3    insulin detemir (LEVEMIR FLEXTOUCH) 100 UNIT/ML injection pen, Inject 30 Units into the skin nightly, Disp: 15 pen, Rfl: 3    Insulin Pen Needle (BD PEN NEEDLE MICRO U/F) 32G X 6 MM MISC, Uses with insulin 4 times a day, Disp: 250 each, Rfl: 5    pravastatin (PRAVACHOL) 20 MG tablet, Take 1 tablet by mouth daily, Disp: 90 tablet, Rfl: 3    losartan (COZAAR) 100 MG tablet, Take 1 tablet by mouth daily, Disp: 90 tablet, Rfl: 5    Insulin Syringes, Disposable, U-100 0.3 ML MISC, 1 each by Does not apply route 3 times daily, Disp: 300 each, Rfl: 12    blood glucose test strips (ASCENSIA AUTODISC VI;ONE TOUCH ULTRA TEST VI) strip, 1 each by In Vitro route daily As needed. , Disp: 100 each, Rfl: 3    latanoprost (XALATAN) 0.005 % ophthalmic solution, Place 1 drop into the left eye nightly , Disp: , Rfl:     Insulin Pen Needle 31G X 5 MM MISC, 1 each by Does not apply route daily, Disp: , Rfl:      The patients home medications have been reviewed. -------------------- ROS --------------------  Review of Systems   Constitutional:  Negative for chills, fatigue and fever. HENT:  Negative for congestion, facial swelling, rhinorrhea, sore throat and trouble swallowing. Eyes:  Negative for pain, redness and itching. Respiratory:  Negative for cough and shortness of breath. Cardiovascular:  Negative for chest pain and palpitations. Gastrointestinal:  Positive for abdominal pain, diarrhea, nausea and vomiting. Negative for constipation. Endocrine: Negative for polyuria. Genitourinary:  Negative for difficulty urinating, dysuria, flank pain and hematuria. Musculoskeletal:  Negative for arthralgias, back pain, myalgias and neck pain. Skin:  Negative for pallor, rash and wound. Neurological:  Negative for dizziness, syncope, weakness, numbness and headaches. Psychiatric/Behavioral:  Negative for agitation, behavioral problems and confusion. The patient is not nervous/anxious.       -------------------- PE --------------------  Physical Exam  Vitals and nursing note reviewed. Constitutional:       General: He is in acute distress. Appearance: Normal appearance. He is ill-appearing. He is not toxic-appearing or diaphoretic. HENT:      Head: Normocephalic and atraumatic.       Right Ear: External ear normal.      Left Ear: External ear normal.      Nose: Nose normal.      Mouth/Throat:      Mouth: Mucous membranes are moist.      Pharynx: Oropharynx is clear. Eyes:      General: No scleral icterus. Right eye: No discharge. Left eye: No discharge. Pupils: Pupils are equal, round, and reactive to light. Cardiovascular:      Rate and Rhythm: Normal rate and regular rhythm. Pulses: Normal pulses. Pulmonary:      Effort: Pulmonary effort is normal. No respiratory distress. Breath sounds: Normal breath sounds. Abdominal:      General: There is no distension. Palpations: Abdomen is soft. Tenderness: generalized abdominal tenderness in the periumbilical area, suprapubic area, left upper quadrant and left lower quadrant There is no guarding or rebound. Negative signs include Corona's sign and McBurney's sign. Musculoskeletal:         General: No tenderness or deformity. Normal range of motion. Cervical back: Normal range of motion. No tenderness. Right lower leg: No edema. Left lower leg: No edema. Skin:     General: Skin is warm and dry. Capillary Refill: Capillary refill takes less than 2 seconds. Coloration: Skin is not jaundiced or pale. Findings: No bruising, erythema, lesion or rash. Neurological:      General: No focal deficit present. Mental Status: He is alert and oriented to person, place, and time. Cranial Nerves: No cranial nerve deficit. Motor: No weakness. Psychiatric:         Mood and Affect: Mood normal.         Behavior: Behavior normal.        -------------------- Procedures --------------------  Procedures     MDM  Number of Diagnoses or Management Options  Generalized abdominal pain  Intractable nausea and vomiting  Diagnosis management comments: 81 y/o M presents today for sudden onset mid and lower abd pain and n/v/d starting this evening.  States feels like previous episodes of diverticulitis. Pt alert, oriented, in some distress due to pain. /99, other vitals wnl. Afebrile. Abd soft, non-distended, painful periumbilical and LLQ regions. Lungs C/E. HRRR. Skin warm, dry. Radial pulses strong symmetric. Concern for aortic pathology, diverticulitis, perforation, gall bladder pathology, other abdominal pathology. Non-formal bedside US utilized to eval abdominal aorta, somewhat limited by gas, however aorta did not appear enlarged. Morphine, zofran given. Lab work and CT imaging were negative. No acute aortic abnormality. Incidental, small right upper lobe pulmonary nodule appreciated. No leukocytosis present. Lactic within normal limits. No anemia. Electrolytes balanced kidney and liver function appear normal.  UA was unremarkable. No evidence of DKA or diabetic complication. Work-up was rather reassuring. Patient symptoms could possibly be due to an early diverticulitis versus gastroenteritis however no evidence of diverticulitis or infection on work-up. Pt required multiple doses of pain medication. Had 1 dose of zofran. Tigan was given for further nausea as he does have prolonged Qtc, 519, however was still having intermittent nausea and dry heaving and does remain uncomfortable and still having abdominal pain once his pain medicine wears down. Recommended admission for intractable nausea and vomiting and abdominal pain and he was amenable to plan. Spoke with Dr. Carmen Salcido, who will admit pt for further care. EKG Interpretation  Interpreted by emergency department physician. 11/18/22  Time: 0019    Rate: 78  Axis: normal  MA: 172  QRS: 150  Qtc: 519  Rhythm: regular  Clinical Impression: NSR, LBBB  Comparison to old EKG:  When compared to 7/1/22, changes have occured      -------------------- Consultations --------------------    Dr. Carmen Salcido, for admission    -------------------- ED COURSE & MEDS GIVEN --------------------  Vitals: 22 0243   BP: (!) 166/67   Pulse: 73   Resp: 19   Temp:    SpO2: 97%        BP (!) 166/67   Pulse 73   Temp 98 °F (36.7 °C) (Oral)   Resp 19   Ht 5' 6\" (1.676 m)   Wt 163 lb (73.9 kg)   SpO2 97%   BMI 26.31 kg/m²     ED Course as of 22   0124 Lipase: 18 [PW]   0124 Lactic Acid: 2.1 [PW]   7054 WBC: 11.0 [PW]   0124 Hemoglobin Quant: 14.3 [PW]   0218 CT ABDOMEN PELVIS W IV CONTRAST Additional Contrast? None  No acute abnormality [PW]   2453 Patient still having abdominal pain and was just in the bathroom dry heaving, second round of morphine was ordered. Review of patient's lab work and CT scans are negative. He did have some relief earlier after initial dose of morphine was given. Will reevaluate [PW]   6019 Patient states his abdominal pain is improved, he did just get some pain medicine, will reevaluate. [PW]   0400 Patient states he is feeling better however does want to wait a few more minutes and see if his pain comes back. He does relate that he went to a  earlier today and had some cheeses and other finger foods and states that his abdominal pain and upset stomach started shortly after getting home.  [PW]      ED Course User Index  [PW] Juan Corbett, DO         Medications   trimethobenzamide Louie Mattes) injection 200 mg (200 mg IntraMUSCular Given 22)   morphine sulfate (PF) injection 4 mg (4 mg IntraVENous Given 22 0016)   ondansetron (ZOFRAN) injection 4 mg (4 mg IntraVENous Given 22 0041)   iopamidol (ISOVUE-370) 76 % injection 75 mL (75 mLs IntraVENous Given 22 0036)   morphine sulfate (PF) injection 4 mg (4 mg IntraVENous Given 22 0242)       -------------------- RESULTS --------------------    LABS:  Results for orders placed or performed during the hospital encounter of 22   CBC with Diff   Result Value Ref Range    WBC 11.0 4.5 - 11.5 E9/L    RBC 5.15 3.80 - 5.80 E12/L    Hemoglobin 14.3 12.5 - 16.5 g/dL Hematocrit 43.6 37.0 - 54.0 %    MCV 84.7 80.0 - 99.9 fL    MCH 27.8 26.0 - 35.0 pg    MCHC 32.8 32.0 - 34.5 %    RDW 13.6 11.5 - 15.0 fL    Platelets 658 701 - 529 E9/L    MPV 14.0 (H) 7.0 - 12.0 fL    Neutrophils % 64.2 43.0 - 80.0 %    Immature Granulocytes % 0.3 0.0 - 5.0 %    Lymphocytes % 12.6 (L) 20.0 - 42.0 %    Monocytes % 10.4 2.0 - 12.0 %    Eosinophils % 12.0 (H) 0.0 - 6.0 %    Basophils % 0.5 0.0 - 2.0 %    Neutrophils Absolute 7.06 1.80 - 7.30 E9/L    Immature Granulocytes # 0.03 E9/L    Lymphocytes Absolute 1.38 (L) 1.50 - 4.00 E9/L    Monocytes Absolute 1.14 (H) 0.10 - 0.95 E9/L    Eosinophils Absolute 1.32 (H) 0.05 - 0.50 E9/L    Basophils Absolute 0.05 0.00 - 0.20 E9/L   CMP   Result Value Ref Range    Sodium 135 132 - 146 mmol/L    Potassium 4.2 3.5 - 5.0 mmol/L    Chloride 99 98 - 107 mmol/L    CO2 23 22 - 29 mmol/L    Anion Gap 13 7 - 16 mmol/L    Glucose 184 (H) 74 - 99 mg/dL    BUN 18 6 - 23 mg/dL    Creatinine 0.8 0.7 - 1.2 mg/dL    Est, Glom Filt Rate >60 >=60 mL/min/1.73    Calcium 10.2 8.6 - 10.2 mg/dL    Total Protein 8.3 6.4 - 8.3 g/dL    Albumin 4.4 3.5 - 5.2 g/dL    Total Bilirubin 0.3 0.0 - 1.2 mg/dL    Alkaline Phosphatase 77 40 - 129 U/L    ALT 24 0 - 40 U/L    AST 22 0 - 39 U/L   Lipase   Result Value Ref Range    Lipase 18 13 - 60 U/L   Lactic Acid (Select if patient is over 65 to rule out mesenteric ischemia)   Result Value Ref Range    Lactic Acid 2.1 0.5 - 2.2 mmol/L   Beta-Hydroxybutyrate   Result Value Ref Range    Beta-Hydroxybutyrate 0.22 0.02 - 0.27 mmol/L   Urinalysis with Microscopic   Result Value Ref Range    Color, UA Yellow Straw/Yellow    Clarity, UA Clear Clear    Glucose, Ur Negative Negative mg/dL    Bilirubin Urine Negative Negative    Ketones, Urine Negative Negative mg/dL    Specific Gravity, UA 1.015 1.005 - 1.030    Blood, Urine TRACE-INTACT Negative    pH, UA 8.0 5.0 - 9.0    Protein, UA 30 (A) Negative mg/dL    Urobilinogen, Urine 0.2 <2.0 E.U./dL Nitrite, Urine Negative Negative    Leukocyte Esterase, Urine Negative Negative    WBC, UA 0-1 0 - 5 /HPF    RBC, UA 0-1 0 - 2 /HPF    Bacteria, UA NONE SEEN None Seen /HPF   EKG 12 Lead (Select if Upper Abd Pain, or SOB, Diaphoresis or Tachy)   Result Value Ref Range    Ventricular Rate 78 BPM    Atrial Rate 78 BPM    P-R Interval 172 ms    QRS Duration 150 ms    Q-T Interval 456 ms    QTc Calculation (Bazett) 519 ms    P Axis 63 degrees    R Axis 45 degrees    T Axis 88 degrees       RADIOLOGY:  XR CHEST PORTABLE   Final Result   Bilateral nodular opacities, better demonstrated on subsequent CT. Bibasilar   atelectasis. CT ABDOMEN PELVIS W IV CONTRAST Additional Contrast? None   Final Result   No acute abdominopelvic abnormality. CTA CHEST W CONTRAST   Final Result   No acute aortic abnormality. Redemonstration of scattered bilateral tree-in-bud nodularity. New right   upper lobe 12 x 5 mm pulmonary nodule. Findings likely represent   infectious/inflammatory change. However, underlying neoplastic process not   excluded. Recommend pulmonary consultation if not previously obtained. Consider imaging follow-up to resolution.             -------------------- NURSING NOTES & VITALS --------------------    The nursing notes within the ED encounter and vital signs have been reviewed. Patient Vitals for the past 8 hrs:   BP Temp Temp src Pulse Resp SpO2 Height Weight   11/18/22 0243 (!) 166/67 -- -- 73 19 97 % -- --   11/17/22 2355 (!) 205/99 98 °F (36.7 °C) Oral 72 22 98 % 5' 6\" (1.676 m) 163 lb (73.9 kg)       Oxygen Saturation Interpretation: Normal      -------------------- PROGRESS NOTES --------------------  Counseling:  I have spoken with the patient and discussed todays results, in addition to providing specific details for the plan of care and counseling regarding the diagnosis and prognosis.   Their questions are answered at this time and they are agreeable with the plan of admission.    -------------------- ADDITIONAL PROVIDER NOTES --------------------    Admission Consultation:  Time: 0386. Spoke with Dr. Oly Diego. Discussed case. They will admit the patient. This patient's ED course included: a personal history and physicial examination, re-evaluation prior to disposition, multiple bedside re-evaluations, IV medications, cardiac monitoring, continuous pulse oximetry, and complex medical decision making and emergency management    Diagnosis:  1. Intractable nausea and vomiting    2. Generalized abdominal pain        Disposition:  Patient's disposition: Admit to obs with tele  Patient's condition is fair. Gena Mendez DO      *NOTE: This report was transcribed using voice recognition software. Every effort was made to ensure accuracy; however, inadvertent computerized transcription errors may be present.        Gena Mendez DO  Resident  11/18/22 9948
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
## 2022-11-18 NOTE — ED NOTES
[facility] phone- [address] [place] [address]  11/18/22 2678

## 2022-11-18 NOTE — H&P
[address]                  [address] [facility],  [facility]                              HISTORY AND PHYSICAL    PATIENT NAME: Cyndie Forrester                      :        1932  MED REC NO:   [id number]                            ROOM:       ANNE  ACCOUNT NO:   [de-identified]                           ADMIT DATE: 2022  PROVIDER:     Cee Ronquillo DO    CHIEF COMPLAINT:  Abdominal pain, nausea, vomiting. HISTORY OF PRESENT ILLNESS:  The patient is a 26-year-old  male  who presented to the emergency room complaining of acute-onset abdominal  pain, nausea, vomiting on 2022 at 09:00 p.m. He was seen in the  emergency room. CT of the abdomen revealed no acute abdominal pelvic  abnormality. CTA of the chest revealed scattered bilateral tree-in-bud  nodularity, new right upper lobe pulmonary nodule. The patient was  admitted for further evaluation and treatment. PRIMARY CARE PHYSICIAN:  Xiomara Rivera DO    MEDICATIONS PRIOR TO ADMISSION:  Cyanocobalamin, Coreg, Linzess,  Compazine p.r.n., Zofran p.r.n., NovoLog, Levemir, Pravachol, Cozaar,  Xalatan eyedrops. PAST MEDICAL HISTORY:  Coronary artery disease, chronic pain syndrome,  constipation, arthritis, diabetes mellitus, glaucoma, hyperlipidemia,  hypertension. PAST SURGICAL HISTORY:  Facial cosmetic surgery, bilateral eye cataract  surgery. SOCIAL HISTORY:  The patient quit tobacco, no alcohol. REVIEW OF SYSTEMS:  Remarkable for above-stated chief complaint plus  chronic constipation. ALLERGIES:  None known. PHYSICAL EXAMINATION:  GENERAL APPEARANCE:  Reveals a 26-year-old  male who is alert,  cooperative and a fair historian. Much of history obtained from the  patient's daughter who is at the bedside. The patient is seen in the  emergency room.   VITAL SIGNS:  Temperature 98 degrees, pulse 72, respirations 22, blood  pressure 205/99, repeat 166/67. HEENT:  Head:  Normocephalic, atraumatic. Eyes:  Pupils equal and  reactive to light. Extraocular muscles intact. There is a right eye  pterygium noted. Nose:  No obstruction, polyp or discharge noted. Mouth mucosa without lesion. Pharynx noninjected without exudate. NECK:  Supple. No JVD. No thyromegaly. No carotid bruits. HEART:  Regular rate and rhythm without murmur. LUNGS:  Clear to auscultation bilaterally. ABDOMEN:  Positive bowel sounds. Soft. There is minimal diffuse  tenderness to palpation. No rebound or guarding. No  hepatosplenomegaly. No masses. BACK:  With increased thoracic kyphosis. EXTREMITIES:  With minimal edema in the bilateral lower extremities. LYMPH NODES:  No adenopathy noted. SKIN:  Without rash or lesion. IMPRESSION:  Abdominal pain, nausea, vomiting, abnormal CTA chest,  coronary artery disease, insulin-requiring diabetes mellitus, elevated  cholesterol, hypertension, chronic constipation, osteoarthritis,  prolonged QTC interval.    PLAN:  Admit. IV fluids, empiric antibiotics were ordered by surgery. Pulmonary to see regarding new lung nodule, antinausea meds. DISCHARGE PLAN:  Home when stable.         Jose Luis Stephen DO    D: 11/18/2022 13:23:23       T: 11/18/2022 13:27:21     [id number]  Job#: [id number]     Doc#: [id number]    CC:

## 2022-11-18 NOTE — ED NOTES
This nurse took pt over for ct and returned back to er dept at this time, pt taken to xray and returned having emesis, Rn aware of iv med needed for nausea. Cardiac monitor on pt.      Aiden Dong LPN  92/86/37 [id number]
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
## 2022-11-18 NOTE — PROGRESS NOTES
Margret Fisher was ordered linzess 290 mcg which is a nonformulary medication. This medication will need to be supplied by the patient as the pharmacy does not carry this non-formulary medication. If the medication has not been administered by 1400 on the following day from the time the order was placed, a pharmacist will follow-up with the nurse of the patient to assess the capability of the patient to bring in the medication. If it is determined that the patient cannot supply the medication and it is not available to be dispensed from the pharmacy, the provider will be notified.

## 2022-11-19 ENCOUNTER — APPOINTMENT (OUTPATIENT)
Dept: GENERAL RADIOLOGY | Age: 87
End: 2022-11-19
Payer: MEDICARE

## 2022-11-19 LAB
BASOPHILS ABSOLUTE: 0.03 E9/L (ref 0–0.2)
BASOPHILS RELATIVE PERCENT: 0.3 % (ref 0–2)
EOSINOPHILS ABSOLUTE: 0.17 E9/L (ref 0.05–0.5)
EOSINOPHILS RELATIVE PERCENT: 1.5 % (ref 0–6)
HCT VFR BLD CALC: 40.2 % (ref 37–54)
HEMOGLOBIN: 13.2 G/DL (ref 12.5–16.5)
IMMATURE GRANULOCYTES #: 0.05 E9/L
IMMATURE GRANULOCYTES %: 0.4 % (ref 0–5)
LACTIC ACID: 2.4 MMOL/L (ref 0.5–2.2)
LYMPHOCYTES ABSOLUTE: 1.26 E9/L (ref 1.5–4)
LYMPHOCYTES RELATIVE PERCENT: 10.9 % (ref 20–42)
MCH RBC QN AUTO: 28.2 PG (ref 26–35)
MCHC RBC AUTO-ENTMCNC: 32.8 % (ref 32–34.5)
MCV RBC AUTO: 85.9 FL (ref 80–99.9)
METER GLUCOSE: 147 MG/DL (ref 74–99)
METER GLUCOSE: 204 MG/DL (ref 74–99)
METER GLUCOSE: 257 MG/DL (ref 74–99)
MONOCYTES ABSOLUTE: 1.35 E9/L (ref 0.1–0.95)
MONOCYTES RELATIVE PERCENT: 11.7 % (ref 2–12)
NEUTROPHILS ABSOLUTE: 8.65 E9/L (ref 1.8–7.3)
NEUTROPHILS RELATIVE PERCENT: 75.2 % (ref 43–80)
PDW BLD-RTO: 14.1 FL (ref 11.5–15)
PLATELET # BLD: 180 E9/L (ref 130–450)
PMV BLD AUTO: 13.9 FL (ref 7–12)
PROCALCITONIN: 0.04 NG/ML (ref 0–0.08)
RBC # BLD: 4.68 E12/L (ref 3.8–5.8)
WBC # BLD: 11.5 E9/L (ref 4.5–11.5)

## 2022-11-19 PROCEDURE — 6360000002 HC RX W HCPCS: Performed by: INTERNAL MEDICINE

## 2022-11-19 PROCEDURE — G0378 HOSPITAL OBSERVATION PER HR: HCPCS

## 2022-11-19 PROCEDURE — 74018 RADEX ABDOMEN 1 VIEW: CPT

## 2022-11-19 PROCEDURE — 2500000003 HC RX 250 WO HCPCS

## 2022-11-19 PROCEDURE — 84145 PROCALCITONIN (PCT): CPT

## 2022-11-19 PROCEDURE — 96361 HYDRATE IV INFUSION ADD-ON: CPT

## 2022-11-19 PROCEDURE — 6370000000 HC RX 637 (ALT 250 FOR IP)

## 2022-11-19 PROCEDURE — 82962 GLUCOSE BLOOD TEST: CPT

## 2022-11-19 PROCEDURE — 6370000000 HC RX 637 (ALT 250 FOR IP): Performed by: INTERNAL MEDICINE

## 2022-11-19 PROCEDURE — 2580000003 HC RX 258: Performed by: INTERNAL MEDICINE

## 2022-11-19 PROCEDURE — S5553 INSULIN LONG ACTING 5 U: HCPCS | Performed by: INTERNAL MEDICINE

## 2022-11-19 PROCEDURE — 6370000000 HC RX 637 (ALT 250 FOR IP): Performed by: SURGERY

## 2022-11-19 PROCEDURE — 96372 THER/PROPH/DIAG INJ SC/IM: CPT

## 2022-11-19 PROCEDURE — 85025 COMPLETE CBC W/AUTO DIFF WBC: CPT

## 2022-11-19 PROCEDURE — 83605 ASSAY OF LACTIC ACID: CPT

## 2022-11-19 PROCEDURE — 96376 TX/PRO/DX INJ SAME DRUG ADON: CPT

## 2022-11-19 PROCEDURE — 99219 PR INITIAL OBSERVATION CARE/DAY 50 MINUTES: CPT | Performed by: SURGERY

## 2022-11-19 PROCEDURE — 96366 THER/PROPH/DIAG IV INF ADDON: CPT

## 2022-11-19 PROCEDURE — 36415 COLL VENOUS BLD VENIPUNCTURE: CPT

## 2022-11-19 RX ORDER — MORPHINE SULFATE 2 MG/ML
2 INJECTION, SOLUTION INTRAMUSCULAR; INTRAVENOUS EVERY 4 HOURS PRN
Status: DISCONTINUED | OUTPATIENT
Start: 2022-11-19 | End: 2022-11-21 | Stop reason: HOSPADM

## 2022-11-19 RX ORDER — SODIUM PHOSPHATE, DIBASIC AND SODIUM PHOSPHATE, MONOBASIC 7; 19 G/133ML; G/133ML
1 ENEMA RECTAL ONCE
Status: COMPLETED | OUTPATIENT
Start: 2022-11-19 | End: 2022-11-19

## 2022-11-19 RX ORDER — POLYETHYLENE GLYCOL 3350 17 G/17G
17 POWDER, FOR SOLUTION ORAL 2 TIMES DAILY
Status: DISCONTINUED | OUTPATIENT
Start: 2022-11-19 | End: 2022-11-21 | Stop reason: HOSPADM

## 2022-11-19 RX ORDER — DEXTROSE MONOHYDRATE 100 MG/ML
INJECTION, SOLUTION INTRAVENOUS CONTINUOUS PRN
Status: DISCONTINUED | OUTPATIENT
Start: 2022-11-19 | End: 2022-11-21 | Stop reason: HOSPADM

## 2022-11-19 RX ORDER — SENNA AND DOCUSATE SODIUM 50; 8.6 MG/1; MG/1
2 TABLET, FILM COATED ORAL DAILY
Status: DISCONTINUED | OUTPATIENT
Start: 2022-11-19 | End: 2022-11-21 | Stop reason: HOSPADM

## 2022-11-19 RX ORDER — SIMETHICONE 80 MG
80 TABLET,CHEWABLE ORAL EVERY 6 HOURS PRN
Status: DISCONTINUED | OUTPATIENT
Start: 2022-11-19 | End: 2022-11-21 | Stop reason: HOSPADM

## 2022-11-19 RX ORDER — BISACODYL 10 MG
10 SUPPOSITORY, RECTAL RECTAL DAILY
Status: DISCONTINUED | OUTPATIENT
Start: 2022-11-19 | End: 2022-11-21 | Stop reason: HOSPADM

## 2022-11-19 RX ADMIN — SENNOSIDES AND DOCUSATE SODIUM 2 TABLET: 50; 8.6 TABLET ORAL at 08:52

## 2022-11-19 RX ADMIN — SENNOSIDES AND DOCUSATE SODIUM 2 TABLET: 50; 8.6 TABLET ORAL at 08:58

## 2022-11-19 RX ADMIN — INSULIN GLARGINE-YFGN 30 UNITS: 100 INJECTION, SOLUTION SUBCUTANEOUS at 20:26

## 2022-11-19 RX ADMIN — PRAVASTATIN SODIUM 20 MG: 20 TABLET ORAL at 20:16

## 2022-11-19 RX ADMIN — OXYCODONE HYDROCHLORIDE 5 MG: 5 TABLET ORAL at 00:50

## 2022-11-19 RX ADMIN — Medication 5 ML: at 20:29

## 2022-11-19 RX ADMIN — SODIUM PHOSPHATE, DIBASIC AND SODIUM PHOSPHATE, MONOBASIC 1 ENEMA: 7; 19 ENEMA RECTAL at 10:00

## 2022-11-19 RX ADMIN — SIMETHICONE CHEW TAB 80 MG 80 MG: 80 TABLET ORAL at 05:00

## 2022-11-19 RX ADMIN — CARVEDILOL 12.5 MG: 6.25 TABLET, FILM COATED ORAL at 09:00

## 2022-11-19 RX ADMIN — OXYCODONE HYDROCHLORIDE 5 MG: 5 TABLET ORAL at 05:00

## 2022-11-19 RX ADMIN — POLYETHYLENE GLYCOL 3350 17 G: 17 POWDER, FOR SOLUTION ORAL at 20:17

## 2022-11-19 RX ADMIN — MORPHINE SULFATE 2 MG: 2 INJECTION, SOLUTION INTRAMUSCULAR; INTRAVENOUS at 13:27

## 2022-11-19 RX ADMIN — METRONIDAZOLE 500 MG: 500 INJECTION, SOLUTION INTRAVENOUS at 04:30

## 2022-11-19 RX ADMIN — LATANOPROST 1 DROP: 50 SOLUTION OPHTHALMIC at 20:17

## 2022-11-19 RX ADMIN — OXYCODONE HYDROCHLORIDE 5 MG: 5 TABLET ORAL at 08:51

## 2022-11-19 RX ADMIN — POLYETHYLENE GLYCOL 3350 17 G: 17 POWDER, FOR SOLUTION ORAL at 09:14

## 2022-11-19 RX ADMIN — SODIUM CHLORIDE: 9 INJECTION, SOLUTION INTRAVENOUS at 05:00

## 2022-11-19 RX ADMIN — ENOXAPARIN SODIUM 40 MG: 100 INJECTION SUBCUTANEOUS at 09:15

## 2022-11-19 RX ADMIN — POLYETHYLENE GLYCOL 3350 17 G: 17 POWDER, FOR SOLUTION ORAL at 01:12

## 2022-11-19 RX ADMIN — BISACODYL 10 MG: 10 SUPPOSITORY RECTAL at 08:54

## 2022-11-19 RX ADMIN — SODIUM CHLORIDE: 9 INJECTION, SOLUTION INTRAVENOUS at 21:40

## 2022-11-19 RX ADMIN — OXYCODONE HYDROCHLORIDE 5 MG: 5 TABLET ORAL at 20:16

## 2022-11-19 RX ADMIN — CARVEDILOL 12.5 MG: 6.25 TABLET, FILM COATED ORAL at 20:16

## 2022-11-19 RX ADMIN — INSULIN LISPRO 4 UNITS: 100 INJECTION, SOLUTION INTRAVENOUS; SUBCUTANEOUS at 17:07

## 2022-11-19 RX ADMIN — MORPHINE SULFATE 2 MG: 2 INJECTION, SOLUTION INTRAMUSCULAR; INTRAVENOUS at 17:37

## 2022-11-19 RX ADMIN — LOSARTAN POTASSIUM 100 MG: 50 TABLET, FILM COATED ORAL at 08:59

## 2022-11-19 ASSESSMENT — PAIN DESCRIPTION - PAIN TYPE: TYPE: ACUTE PAIN

## 2022-11-19 ASSESSMENT — PAIN SCALES - GENERAL
PAINLEVEL_OUTOF10: 10
PAINLEVEL_OUTOF10: 10
PAINLEVEL_OUTOF10: 7
PAINLEVEL_OUTOF10: 10
PAINLEVEL_OUTOF10: 7
PAINLEVEL_OUTOF10: 10

## 2022-11-19 ASSESSMENT — PAIN DESCRIPTION - DESCRIPTORS
DESCRIPTORS: ACHING
DESCRIPTORS: ACHING;CRAMPING;DISCOMFORT
DESCRIPTORS: ACHING;CRAMPING;DISCOMFORT

## 2022-11-19 ASSESSMENT — PAIN DESCRIPTION - ORIENTATION
ORIENTATION: LOWER

## 2022-11-19 ASSESSMENT — PAIN DESCRIPTION - LOCATION
LOCATION: ABDOMEN

## 2022-11-19 NOTE — PROGRESS NOTES
GENERAL SURGERY  DAILY PROGRESS NOTE  11/19/2022    Chief Complaint   Patient presents with    Abdominal Pain     Non-radiating Mid Abdominal Pain/ Emesis onset 2230 last evening; denies chest pain sob diarrhea fever       Subjective:  Severe abdominal pain overnight. Patient assessed overnight. Abdomen soft mildly distended no rebound no guarding at that time. Pain improved this morning. No nausea this morning as well. Per chart review appears patient has had similar episodes of abdominal pain and nausea and vomiting in the past which resolved with bowel movements. He does report a history of chronic constipation after Vicodin use 20 years ago. On CT abdomen pelvis it appears the patient is extremity constipated    Objective:  BP (!) 168/68   Pulse 79   Temp 98.3 °F (36.8 °C) (Oral)   Resp 13   Ht 5' 6\" (1.676 m)   Wt 163 lb (73.9 kg)   SpO2 97%   BMI 26.31 kg/m²     GENERAL:  Laying in bed, awake, alert, cooperative, no apparent distress  HEAD: Normocephalic, atraumatic  EYES: No sclera icterus, pupils equal  LUNGS:  No increased work of breathing  CARDIOVASCULAR:  regular rate   ABDOMEN:  Soft, non-tender, mildly distended   EXTREMITIES: No edema or swelling  SKIN: Warm and dry    Assessment/Plan:  80 y.o. male with intractable nausea and abdominal pain likely secondary to constipation    - start on glycolax bid, suppository daily, and senna   - keep NPO with mIVF   -Minimize narcotic use  - correct electrolyte abnormalities, K > 4 Mg > 2   - ok to dc abx   - no surgical intervention at this time. Surgery to sign off.  Please page [facility] with any other questions or concerns     Electronically signed by Safia Becerra MD on 11/19/2022 at 6:59 AM

## 2022-11-19 NOTE — PROGRESS NOTES
Pt beveled over in pain screaming in pain. Fleets enema given per order with med results. He states no relief.  Surgical resident notified

## 2022-11-19 NOTE — PROGRESS NOTES
Received several pages from nursing staff about abdominal pain today. I have seen and evaluated the patient twice, once around 1100 and again at 1400. Both times the patient appeared comfortable and with stable vitals. Abdomen was soft, nondistended and nontender. I explained to the patient that I could attempt a digital disimpaction if he would like, as his CTAP from 11/18 showed significant stool burden, and he shook his head and stated that he had already had suppositories placed that did not do much for him. Also stated that he had decent relief after the enema this morning. KUB pending.     Nicole Hall, DO

## 2022-11-19 NOTE — PROGRESS NOTES
Subjective:    Chief complaint:    Complaining of severe generalized abdominal pain  Wife is by the bedside      Objective:    BP (!) 164/59   Pulse 72   Temp 98.9 °F (37.2 °C) (Oral)   Resp 16   Ht 5' 6\" (1.676 m)   Wt 163 lb (73.9 kg)   SpO2 97%   BMI 26.31 kg/m²   General : Awake ,alert,no distress. Heart:  RRR, no murmurs, gallops, or rubs. Lungs:  CTA bilaterally, no wheeze, rales or rhonchi  Abd: bowel sounds present, abdomen distended diffusely but is soft, bowel sounds heard generalized tenderness present without rebound  Extrem:  No clubbing, cyanosis, or edema    CBC:   Lab Results   Component Value Date/Time    WBC 11.0 11/18/2022 12:16 AM    RBC 5.15 11/18/2022 12:16 AM    HGB 14.3 11/18/2022 12:16 AM    HCT 43.6 11/18/2022 12:16 AM    MCV 84.7 11/18/2022 12:16 AM    MCH 27.8 11/18/2022 12:16 AM    MCHC 32.8 11/18/2022 12:16 AM    RDW 13.6 11/18/2022 12:16 AM     11/18/2022 12:16 AM    MPV 14.0 11/18/2022 12:16 AM     BMP:    Lab Results   Component Value Date/Time     11/18/2022 12:16 AM    K 4.2 11/18/2022 12:16 AM    K 4.2 07/01/2022 12:13 PM    CL 99 11/18/2022 12:16 AM    CO2 23 11/18/2022 12:16 AM    BUN 18 11/18/2022 12:16 AM    LABALBU 4.4 11/18/2022 12:16 AM    CREATININE 0.8 11/18/2022 12:16 AM    CALCIUM 10.2 11/18/2022 12:16 AM    GFRAA >60 10/11/2022 09:56 AM    LABGLOM >60 11/18/2022 12:16 AM    GLUCOSE 266 11/18/2022 12:58 PM     PT/INR:  No results found for: PROTIME, INR  Troponin:    Lab Results   Component Value Date/Time    TROPONINI <0.01 04/20/2020 03:54 PM       No results for input(s): LABURIN in the last 72 hours. No results for input(s): BC in the last 72 hours. No results for input(s): Jarred Melgar in the last 72 hours.       Current Facility-Administered Medications:     polyethylene glycol (GLYCOLAX) packet 17 g, 17 g, Oral, BID, Kyla Hodgkins, MD, 17 g at 11/19/22 0914    sennosides-docusate sodium (SENOKOT-S) 8.6-50 MG tablet 2 tablet, 2 tablet, Oral, Daily, Valeriano Carter MD Yaron, 2 tablet at 11/19/22 0858    simethicone (MYLICON) chewable tablet 80 mg, 80 mg, Oral, Q6H PRN, Eduardo Saab MD, 80 mg at 11/19/22 0500    bisacodyl (DULCOLAX) suppository 10 mg, 10 mg, Rectal, Daily, Eduardo Saab MD, 10 mg at 11/19/22 0854    morphine (PF) injection 2 mg, 2 mg, IntraVENous, Q4H PRN, Santi Cox MD    trimethobenzamide Arman Lapidus) injection 200 mg, 200 mg, IntraMUSCular, Q6H PRN, Sujit Barry, DO, 200 mg at 11/18/22 1253    carvedilol (COREG) tablet 12.5 mg, 12.5 mg, Oral, BID, Sujit Barry, DO, 12.5 mg at 11/19/22 0900    latanoprost (XALATAN) 0.005 % ophthalmic solution 1 drop, 1 drop, Left Eye, Nightly, Sujit Barry DO    insulin glargine-yfgn (SEMGLEE-YFGN) injection vial 30 Units, 30 Units, SubCUTAneous, Nightly, Sujit Baryr, DO    linaclotide Adrianne Lager) capsule 290 mcg (Patient Supplied), 290 mcg, Oral, QAM AC, Sujit Barry DO    losartan (COZAAR) tablet 100 mg, 100 mg, Oral, Daily, Sujit Barry, DO, 100 mg at 11/19/22 0859    pravastatin (PRAVACHOL) tablet 20 mg, 20 mg, Oral, Nightly, Sujit Barry DO, 20 mg at 11/18/22 2045    insulin lispro (HUMALOG) injection vial 0-8 Units, 0-8 Units, SubCUTAneous, TID WC, Sujit Barry DO, 4 Units at 11/18/22 1302    insulin lispro (HUMALOG) injection vial 0-4 Units, 0-4 Units, SubCUTAneous, Nightly, Sujit Barry DO    sodium chloride flush 0.9 % injection 5-40 mL, 5-40 mL, IntraVENous, 2 times per day, Edita Verde DO, 10 mL at 11/18/22 2055    sodium chloride flush 0.9 % injection 5-40 mL, 5-40 mL, IntraVENous, PRN, Sujit Tabor Malmer, DO    0.9 % sodium chloride infusion, , IntraVENous, PRN, Sujit Betancourtmer, DO    enoxaparin (LOVENOX) injection 40 mg, 40 mg, SubCUTAneous, Daily, Sujit Barry, DO, 40 mg at 11/19/22 0915    acetaminophen (TYLENOL) tablet 650 mg, 650 mg, Oral, Q6H PRN **OR** acetaminophen (TYLENOL) suppository 650 mg, 650 mg, Rectal, Q6H PRN, Sujit Tabor Malmer, DO    0.9 % sodium chloride infusion, , IntraVENous, Continuous, Kimi Martinez Malmer, DO, Last Rate: 75 mL/hr at 11/19/22 0530, Rate Verify at 11/19/22 0530    oxyCODONE (ROXICODONE) immediate release tablet 2.5 mg, 2.5 mg, Oral, Q4H PRN **OR** oxyCODONE (ROXICODONE) immediate release tablet 5 mg, 5 mg, Oral, Q4H PRN, Brandon White, DO, 5 mg at 11/19/22 0851    ADULT DIET; Regular    XR CHEST PORTABLE   Final Result   Bilateral nodular opacities, better demonstrated on subsequent CT. Bibasilar   atelectasis. CT ABDOMEN PELVIS W IV CONTRAST Additional Contrast? None   Final Result   No acute abdominopelvic abnormality. CTA CHEST W CONTRAST   Final Result   No acute aortic abnormality. Redemonstration of scattered bilateral tree-in-bud nodularity. New right   upper lobe 12 x 5 mm pulmonary nodule. Findings likely represent   infectious/inflammatory change. However, underlying neoplastic process not   excluded. Recommend pulmonary consultation if not previously obtained. Consider imaging follow-up to resolution. Assessment:    Principal Problem:    Intractable nausea and vomiting  Resolved Problems:    * No resolved hospital problems. *  Possible diverticulitis  Issues with chronic constipation  Prior history of coronary artery disease  Diabetes mellitus type 2  Hyperlipidemia  Hypertension    Plan:    General surgery following  Empiric antibiotics  Multiple bowel interventions have already been tried  Patient is requesting morphine  Stat labs  Check KUB  Discussed with nurse  Notify general surgery for reevaluation as soon as possible        Marcela Romeo MD  12:58 PM  11/19/2022    NOTE: This report was transcribed using voice recognition software.  Every effort was made to ensure accuracy; however, inadvertent transcription errors may be present

## 2022-11-19 NOTE — CONSULTS
[facility]  GENERAL SURGERY     ATTENDING PHYSICIAN PROGRESS NOTE   I have examined the patient, reviewed the record, and discussed the case with the APN/  Resident. I have reviewed all relevant labs and imaging data. Please refer to the  APN/ resident's note. I agree with the  assessment and plan with the following corrections/ additions. The following summarizes my clinical findings and independent assessment. CC:  abdominal pain    HPI:  80 y.o. male who we are asked to see for abdominal pain. Patient presented to ED on November 17 for left lower quadrant abdominal pain. He also had intermittent nausea and vomiting. No coffee-ground emesis. Patient notes a history of constipation    The patient reported  acute, left lower quadrant pain with associated nausea and vomiting . The intensity of the pain is moderate. There are no alleviating or worsening factors regarding the pain. Patient currently denies any fevers or chills. Patient states he is passing gas. Past medical/surgical/family history: as noted above.   Medications/allergies: as noted above  Social History: as noted above    Review of Systems:  Review of Systems - History obtained from the patient  General ROS: negative  Psychological ROS: negative  Ophthalmic ROS: negative  Allergy and Immunology ROS: negative  Hematological and Lymphatic ROS: negative  Endocrine ROS: negative  Breast ROS: negative  Respiratory ROS: negative  Cardiovascular ROS: negative  Gastrointestinal ROS: positive for - abdominal pain and gas/bloating  Genito-Urinary ROS: negative  Musculoskeletal ROS: negative      Physical Exam:  BP (!) 164/59   Pulse 72   Temp 98.9 °F (37.2 °C) (Oral)   Resp 16   Ht 5' 6\" (1.676 m)   Wt 163 lb (73.9 kg)   SpO2 97%   BMI 26.31 kg/m²     Vitals:    11/19/22 0813   BP: (!) 164/59   Pulse: 72   Resp: 16   Temp: 98.9 °F (37.2 °C)   SpO2: 97%       PSYCH: mood and affect normal, alert and oriented x 3  CONSTITUTIONAL: No apparent distress, comfortable  EYES: Sclera white, pupils equal round and reactive to light  ENMT:  Hearing normal, trachea midline, ears externally intact  LYMPH: no lympadenopathy in neck. No lympadenopathy in groins  RESP: Breath sounds were clear and equal with no rales, wheezes, or rhonchi. Respiratory effort was normal with no retractions or use of accessory muscles. CV: Heart sounds were normal with a regular rate and rhythm. No pedal edema  GI/ Abdomen: The abdomen was soft and non distended. There was minimal tenderness; no guarding, rebound, or rigidity. There was no                     masses, hepatosplenomegaly, or hernias. Genitourinary: No inguinal hernias were noted on coughing and straining. Rectal -deferred  MSK: no clubbing/ no cyanosis/ gait normal           ASSESSMENT:  Principal Problem:    Intractable nausea and vomiting  Resolved Problems:    * No resolved hospital problems. *       PLAN:  I have reviewed the prior progress note from the patient's primary care provider visit on 11/18  . I have reviewed the progress note from the ED visit on 11/18. I have reviewed the following test results: CBC/ BMP    I have personally reviewed the CAT Scan imaging and my interpretation is some mild diverticulosis and constipation. Patient has excess amounts of stool in his sigmoid left transverse colon. Abdominal pain-this is likely secondary to his chronic constipation. At this time he has been afebrile. Clinically he has been on peritoneal exam.  I will discontinue his Rocephin and Flagyl. I will increase his bowel regiment. I am going to increase his MiraLAX to twice daily, increase his Senokot to twice daily and administer a rectal suppository. Patient needs to stay on aggressive bowel regiment.   I believe his abdominal pain nausea vomit is all secondary to exacerbation of his chronic constipation  No plans for surgical intervention at this time  Will initiate his diet since the nausea and vomiting have now resolved      Deanne Sheth MD, FACS  11/19/2022  9:12 AM    NOTE: This report was transcribed using voice recognition software. Every effort was made to ensure accuracy; however, inadvertent computerized transcription errors may be present.

## 2022-11-19 NOTE — PROGRESS NOTES
Pt screaming out in pain. He said it is not the normal feeling he gets when hes constipated. Surgical resident paged. oxy given BS hypo x 4 firm tender throughout

## 2022-11-19 NOTE — PROGRESS NOTES
Resident called to ask what patient symptoms were because when he saw pt earlier he had been resting. Order has been placed for KUB and morphine. I explained pt his having a lot of nausea, abdominal pain, and dry heaves.

## 2022-11-20 LAB
METER GLUCOSE: 143 MG/DL (ref 74–99)
METER GLUCOSE: 147 MG/DL (ref 74–99)
METER GLUCOSE: 219 MG/DL (ref 74–99)
METER GLUCOSE: 220 MG/DL (ref 74–99)

## 2022-11-20 PROCEDURE — G0378 HOSPITAL OBSERVATION PER HR: HCPCS

## 2022-11-20 PROCEDURE — S5553 INSULIN LONG ACTING 5 U: HCPCS | Performed by: INTERNAL MEDICINE

## 2022-11-20 PROCEDURE — 6370000000 HC RX 637 (ALT 250 FOR IP): Performed by: INTERNAL MEDICINE

## 2022-11-20 PROCEDURE — 6370000000 HC RX 637 (ALT 250 FOR IP)

## 2022-11-20 PROCEDURE — 82962 GLUCOSE BLOOD TEST: CPT

## 2022-11-20 PROCEDURE — 96372 THER/PROPH/DIAG INJ SC/IM: CPT

## 2022-11-20 PROCEDURE — 6360000002 HC RX W HCPCS: Performed by: INTERNAL MEDICINE

## 2022-11-20 RX ADMIN — INSULIN LISPRO 2 UNITS: 100 INJECTION, SOLUTION INTRAVENOUS; SUBCUTANEOUS at 09:19

## 2022-11-20 RX ADMIN — ENOXAPARIN SODIUM 40 MG: 100 INJECTION SUBCUTANEOUS at 09:19

## 2022-11-20 RX ADMIN — OXYCODONE HYDROCHLORIDE 5 MG: 5 TABLET ORAL at 22:33

## 2022-11-20 RX ADMIN — CARVEDILOL 12.5 MG: 6.25 TABLET, FILM COATED ORAL at 22:28

## 2022-11-20 RX ADMIN — POLYETHYLENE GLYCOL 3350 17 G: 17 POWDER, FOR SOLUTION ORAL at 22:28

## 2022-11-20 RX ADMIN — INSULIN GLARGINE-YFGN 30 UNITS: 100 INJECTION, SOLUTION SUBCUTANEOUS at 22:28

## 2022-11-20 RX ADMIN — LOSARTAN POTASSIUM 100 MG: 50 TABLET, FILM COATED ORAL at 09:19

## 2022-11-20 RX ADMIN — CARVEDILOL 12.5 MG: 6.25 TABLET, FILM COATED ORAL at 09:19

## 2022-11-20 RX ADMIN — PRAVASTATIN SODIUM 20 MG: 20 TABLET ORAL at 22:28

## 2022-11-20 RX ADMIN — POLYETHYLENE GLYCOL 3350 17 G: 17 POWDER, FOR SOLUTION ORAL at 09:19

## 2022-11-20 RX ADMIN — LATANOPROST 1 DROP: 50 SOLUTION OPHTHALMIC at 22:28

## 2022-11-20 RX ADMIN — SENNOSIDES AND DOCUSATE SODIUM 2 TABLET: 50; 8.6 TABLET ORAL at 09:19

## 2022-11-20 RX ADMIN — BISACODYL 10 MG: 10 SUPPOSITORY RECTAL at 09:19

## 2022-11-20 ASSESSMENT — PAIN DESCRIPTION - LOCATION: LOCATION: BACK

## 2022-11-20 ASSESSMENT — PAIN DESCRIPTION - ORIENTATION: ORIENTATION: POSTERIOR

## 2022-11-20 ASSESSMENT — PAIN DESCRIPTION - DESCRIPTORS: DESCRIPTORS: ACHING

## 2022-11-20 ASSESSMENT — PAIN SCALES - GENERAL: PAINLEVEL_OUTOF10: 7

## 2022-11-20 NOTE — PROGRESS NOTES
Subjective:    Chief complaint:    Patient is feeling significantly improved today  Able to tolerate food  Pain is under control  Daughter is by the bedside  Objective:    BP (!) 154/45   Pulse 72   Temp 99.1 °F (37.3 °C)   Resp 13   Ht 5' 6\" (1.676 m)   Wt 163 lb (73.9 kg)   SpO2 95%   BMI 26.31 kg/m²   General : Awake ,alert,no distress. Heart:  RRR, no murmurs, gallops, or rubs. Lungs:  CTA bilaterally, no wheeze, rales or rhonchi  Abd: bowel sounds present, abdomen distended diffusely but is soft, bowel sounds heard generalized tenderness present without rebound  Extrem:  No clubbing, cyanosis, or edema    CBC:   Lab Results   Component Value Date/Time    WBC 11.5 11/19/2022 02:18 PM    RBC 4.68 11/19/2022 02:18 PM    HGB 13.2 11/19/2022 02:18 PM    HCT 40.2 11/19/2022 02:18 PM    MCV 85.9 11/19/2022 02:18 PM    MCH 28.2 11/19/2022 02:18 PM    MCHC 32.8 11/19/2022 02:18 PM    RDW 14.1 11/19/2022 02:18 PM     11/19/2022 02:18 PM    MPV 13.9 11/19/2022 02:18 PM     BMP:    Lab Results   Component Value Date/Time     11/18/2022 12:16 AM    K 4.2 11/18/2022 12:16 AM    K 4.2 07/01/2022 12:13 PM    CL 99 11/18/2022 12:16 AM    CO2 23 11/18/2022 12:16 AM    BUN 18 11/18/2022 12:16 AM    LABALBU 4.4 11/18/2022 12:16 AM    CREATININE 0.8 11/18/2022 12:16 AM    CALCIUM 10.2 11/18/2022 12:16 AM    GFRAA >60 10/11/2022 09:56 AM    LABGLOM >60 11/18/2022 12:16 AM    GLUCOSE 266 11/18/2022 12:58 PM     PT/INR:  No results found for: PROTIME, INR  Troponin:    Lab Results   Component Value Date/Time    TROPONINI <0.01 04/20/2020 03:54 PM       No results for input(s): LABURIN in the last 72 hours. No results for input(s): BC in the last 72 hours. No results for input(s): Amaryllis Etienne in the last 72 hours.       Current Facility-Administered Medications:     polyethylene glycol (GLYCOLAX) packet 17 g, 17 g, Oral, BID, José Manuel Helms MD, 17 g at 11/20/22 0919    sennosides-docusate sodium (SENOKOT-S) 8.6-50 MG tablet 2 tablet, 2 tablet, Oral, Daily, Arely Rosas MD, 2 tablet at 11/20/22 0919    simethicone (MYLICON) chewable tablet 80 mg, 80 mg, Oral, Q6H PRN, Arely Rosas MD, 80 mg at 11/19/22 0500    bisacodyl (DULCOLAX) suppository 10 mg, 10 mg, Rectal, Daily, Arely Rosas MD, 10 mg at 11/20/22 0919    morphine (PF) injection 2 mg, 2 mg, IntraVENous, Q4H PRN, Ane Boast, MD, 2 mg at 11/19/22 1737    glucose chewable tablet 16 g, 4 tablet, Oral, PRN, Ane Boast, MD    dextrose bolus 10% 125 mL, 125 mL, IntraVENous, PRN **OR** dextrose bolus 10% 250 mL, 250 mL, IntraVENous, PRN, Ane Boast, MD    glucagon (rDNA) injection 1 mg, 1 mg, SubCUTAneous, PRN, Ane Boast, MD    dextrose 10 % infusion, , IntraVENous, Continuous PRN, Ane Boast, MD    trimethobenzamide Rosiland Bradford) injection 200 mg, 200 mg, IntraMUSCular, Q6H PRN, Teresita Barry, DO, 200 mg at 11/18/22 1253    carvedilol (COREG) tablet 12.5 mg, 12.5 mg, Oral, BID, Teresita Barry, DO, 12.5 mg at 11/20/22 0919    latanoprost (XALATAN) 0.005 % ophthalmic solution 1 drop, 1 drop, Left Eye, Nightly, Teresita Barry DO, 1 drop at 11/19/22 2017    insulin glargine-yfgn (SEMGLEE-YFGN) injection vial 30 Units, 30 Units, SubCUTAneous, Nightly, Teresita Barry, DO, 30 Units at 11/19/22 2026    linaclotide (LINZESS) capsule 290 mcg (Patient Supplied), 290 mcg, Oral, QAM AC, Teresita Barry, DO, 290 mcg at 11/20/22 9751    losartan (COZAAR) tablet 100 mg, 100 mg, Oral, Daily, Teresita Barry DO, 100 mg at 11/20/22 0919    pravastatin (PRAVACHOL) tablet 20 mg, 20 mg, Oral, Nightly, Teresita Barry DO, 20 mg at 11/19/22 2016    insulin lispro (HUMALOG) injection vial 0-8 Units, 0-8 Units, SubCUTAneous, TID WC, Teresita Barry DO, 2 Units at 11/20/22 0919    insulin lispro (HUMALOG) injection vial 0-4 Units, 0-4 Units, SubCUTAneous, Nightly, Teresita Barry DO    sodium chloride flush 0.9 % injection 5-40 mL, 5-40 mL, IntraVENous, 2 times per day, Ayo Nieto, DO, 5 mL at 11/19/22 2029    sodium chloride flush 0.9 % injection 5-40 mL, 5-40 mL, IntraVENous, PRN, Loletha Cecilia Malmer, DO    0.9 % sodium chloride infusion, , IntraVENous, PRN, Loletha Cecilia Malmer, DO    enoxaparin (LOVENOX) injection 40 mg, 40 mg, SubCUTAneous, Daily, Loletha Cecilia Malmer, DO, 40 mg at 11/20/22 9894    acetaminophen (TYLENOL) tablet 650 mg, 650 mg, Oral, Q6H PRN **OR** acetaminophen (TYLENOL) suppository 650 mg, 650 mg, Rectal, Q6H PRN, Loletha Cecilia Malmer, DO    0.9 % sodium chloride infusion, , IntraVENous, Continuous, Loletha Cecilia Malmer, DO, Last Rate: 75 mL/hr at 11/19/22 2140, New Bag at 11/19/22 2140    oxyCODONE (ROXICODONE) immediate release tablet 2.5 mg, 2.5 mg, Oral, Q4H PRN **OR** oxyCODONE (ROXICODONE) immediate release tablet 5 mg, 5 mg, Oral, Q4H PRN, Brandon Bagent, DO, 5 mg at 11/19/22 2016    ADULT DIET; Regular    XR ABDOMEN (KUB) (SINGLE AP VIEW)   Final Result   No acute process         XR CHEST PORTABLE   Final Result   Bilateral nodular opacities, better demonstrated on subsequent CT. Bibasilar   atelectasis. CT ABDOMEN PELVIS W IV CONTRAST Additional Contrast? None   Final Result   No acute abdominopelvic abnormality. CTA CHEST W CONTRAST   Final Result   No acute aortic abnormality. Redemonstration of scattered bilateral tree-in-bud nodularity. New right   upper lobe 12 x 5 mm pulmonary nodule. Findings likely represent   infectious/inflammatory change. However, underlying neoplastic process not   excluded. Recommend pulmonary consultation if not previously obtained. Consider imaging follow-up to resolution. Assessment:    Principal Problem:    Intractable nausea and vomiting  Resolved Problems:    * No resolved hospital problems.  *  Possible diverticulitis  Issues with chronic constipation  Prior history of coronary artery disease  Diabetes mellitus type 2  Hyperlipidemia  Hypertension    Plan:  Avoidance of narcotics discussed with the patient  Advance diet  Continue antibiotics  Possible discharge tomorrow if able to tolerate diet without pain  He may need outpatient GI evaluation regarding his significant issues with constipation    Angelina Neumann MD  1:25 PM  11/20/2022    NOTE: This report was transcribed using voice recognition software.  Every effort was made to ensure accuracy; however, inadvertent transcription errors may be present

## 2022-11-21 VITALS
SYSTOLIC BLOOD PRESSURE: 170 MMHG | RESPIRATION RATE: 18 BRPM | WEIGHT: 163 LBS | HEART RATE: 71 BPM | OXYGEN SATURATION: 95 % | TEMPERATURE: 97.7 F | BODY MASS INDEX: 26.2 KG/M2 | HEIGHT: 66 IN | DIASTOLIC BLOOD PRESSURE: 71 MMHG

## 2022-11-21 LAB
ALBUMIN SERPL-MCNC: 4 G/DL (ref 3.5–5.2)
ALP BLD-CCNC: 62 U/L (ref 40–129)
ALT SERPL-CCNC: 29 U/L (ref 0–40)
ANION GAP SERPL CALCULATED.3IONS-SCNC: 7 MMOL/L (ref 7–16)
AST SERPL-CCNC: 42 U/L (ref 0–39)
BILIRUB SERPL-MCNC: 0.6 MG/DL (ref 0–1.2)
BUN BLDV-MCNC: 12 MG/DL (ref 6–23)
CALCIUM SERPL-MCNC: 9.6 MG/DL (ref 8.6–10.2)
CHLORIDE BLD-SCNC: 101 MMOL/L (ref 98–107)
CO2: 30 MMOL/L (ref 22–29)
CREAT SERPL-MCNC: 0.8 MG/DL (ref 0.7–1.2)
GFR SERPL CREATININE-BSD FRML MDRD: >60 ML/MIN/1.73
GLUCOSE BLD-MCNC: 78 MG/DL (ref 74–99)
HCT VFR BLD CALC: 41.9 % (ref 37–54)
HEMOGLOBIN: 13.8 G/DL (ref 12.5–16.5)
MCH RBC QN AUTO: 28.3 PG (ref 26–35)
MCHC RBC AUTO-ENTMCNC: 32.9 % (ref 32–34.5)
MCV RBC AUTO: 85.9 FL (ref 80–99.9)
METER GLUCOSE: 189 MG/DL (ref 74–99)
METER GLUCOSE: 67 MG/DL (ref 74–99)
PDW BLD-RTO: 13.8 FL (ref 11.5–15)
PLATELET # BLD: 193 E9/L (ref 130–450)
PMV BLD AUTO: 12.7 FL (ref 7–12)
POTASSIUM SERPL-SCNC: 3.8 MMOL/L (ref 3.5–5)
RBC # BLD: 4.88 E12/L (ref 3.8–5.8)
SODIUM BLD-SCNC: 138 MMOL/L (ref 132–146)
TOTAL PROTEIN: 7.5 G/DL (ref 6.4–8.3)
WBC # BLD: 10 E9/L (ref 4.5–11.5)

## 2022-11-21 PROCEDURE — 85027 COMPLETE CBC AUTOMATED: CPT

## 2022-11-21 PROCEDURE — 82962 GLUCOSE BLOOD TEST: CPT

## 2022-11-21 PROCEDURE — 6360000002 HC RX W HCPCS: Performed by: INTERNAL MEDICINE

## 2022-11-21 PROCEDURE — 36415 COLL VENOUS BLD VENIPUNCTURE: CPT

## 2022-11-21 PROCEDURE — 80053 COMPREHEN METABOLIC PANEL: CPT

## 2022-11-21 PROCEDURE — 6370000000 HC RX 637 (ALT 250 FOR IP): Performed by: INTERNAL MEDICINE

## 2022-11-21 PROCEDURE — G0378 HOSPITAL OBSERVATION PER HR: HCPCS

## 2022-11-21 PROCEDURE — 6370000000 HC RX 637 (ALT 250 FOR IP)

## 2022-11-21 PROCEDURE — 96372 THER/PROPH/DIAG INJ SC/IM: CPT

## 2022-11-21 RX ORDER — INSULIN ASPART 100 [IU]/ML
12 INJECTION, SOLUTION INTRAVENOUS; SUBCUTANEOUS
Qty: 1 ADJUSTABLE DOSE PRE-FILLED PEN SYRINGE | Refills: 0
Start: 2022-11-21

## 2022-11-21 RX ADMIN — SENNOSIDES AND DOCUSATE SODIUM 2 TABLET: 50; 8.6 TABLET ORAL at 09:44

## 2022-11-21 RX ADMIN — POLYETHYLENE GLYCOL 3350 17 G: 17 POWDER, FOR SOLUTION ORAL at 09:45

## 2022-11-21 RX ADMIN — LOSARTAN POTASSIUM 100 MG: 50 TABLET, FILM COATED ORAL at 09:44

## 2022-11-21 RX ADMIN — ENOXAPARIN SODIUM 40 MG: 100 INJECTION SUBCUTANEOUS at 09:44

## 2022-11-21 RX ADMIN — BISACODYL 10 MG: 10 SUPPOSITORY RECTAL at 09:45

## 2022-11-21 RX ADMIN — CARVEDILOL 12.5 MG: 6.25 TABLET, FILM COATED ORAL at 09:44

## 2022-11-21 ASSESSMENT — PAIN SCALES - GENERAL
PAINLEVEL_OUTOF10: 0
PAINLEVEL_OUTOF10: 0

## 2022-11-21 NOTE — PROGRESS NOTES
Mora Chapman M.D.,[facility]  Kyleigh Hendricks D.O., F.A.C.O.I., [place] M.D. Belkys Rlole M.D. Sylvia Suggs D.O. Daily Pulmonary Progress Note    Patient:  Dottie Owens 80 y.o. male MRN: [id number]     Date of Service: 11/21/2022      Synopsis     We are following patient for pulmonary nodules    \"CC\" abdominal pain    Code status: Full      Subjective      Patient was seen and examined. Dressed and ready to discharge home. No further abdominal discomfort. Plan of care for pulmonary follow-up reviewed with CT chest 3 months for further evaluation of pulmonary nodules, tree-in-bud changes. Review of Systems:  Constitutional: Denies fever, weight loss, night sweats, and fatigue  Skin: Denies pigmentation, dark lesions, and rashes   HEENT: Denies hearing loss, tinnitus, ear drainage, epistaxis, sore throat, and hoarseness. Cardiovascular: Denies palpitations, chest pain, and chest pressure. Respiratory: Denies cough, dyspnea at rest, hemoptysis, apnea, and choking.   Gastrointestinal: Denies nausea, vomiting, poor appetite, diarrhea, heartburn or reflux  Genitourinary: Denies dysuria, frequency, urgency or hematuria  Musculoskeletal: Denies myalgias, muscle weakness, and bone pain  Neurological: Denies dizziness, vertigo, headache, and focal weakness  Psychological: Denies anxiety and depression  Endocrine: Denies heat intolerance and cold intolerance  Hematopoietic/Lymphatic: Denies bleeding problems and blood transfusions    24-hour events:  None    Objective   Vitals: BP (!) 170/71   Pulse 71   Temp 97.7 °F (36.5 °C) (Oral)   Resp 18   Ht 5' 6\" (1.676 m)   Wt 163 lb (73.9 kg)   SpO2 95%   BMI 26.31 kg/m²     I/O:    Intake/Output Summary (Last 24 hours) at 11/21/2022 1424  Last data filed at 11/21/2022 1215  Gross per 24 hour   Intake 420 ml   Output --   Net 420 ml                        CURRENT MEDS :  Scheduled Meds:   polyethylene glycol  17 g Oral BID sennosides-docusate sodium  2 tablet Oral Daily    bisacodyl  10 mg Rectal Daily    carvedilol  12.5 mg Oral BID    latanoprost  1 drop Left Eye Nightly    insulin glargine-yfgn  30 Units SubCUTAneous Nightly    linaclotide  290 mcg Oral QAM AC    losartan  100 mg Oral Daily    pravastatin  20 mg Oral Nightly    insulin lispro  0-8 Units SubCUTAneous TID WC    insulin lispro  0-4 Units SubCUTAneous Nightly    sodium chloride flush  5-40 mL IntraVENous 2 times per day    enoxaparin  40 mg SubCUTAneous Daily       Physical Exam:  General Appearance: appears comfortable in no acute distress. HEENT: Normocephalic atraumatic without obvious abnormality   Neck: Lips, mucosa, and tongue normal.  Supple, symmetrical, trachea midline, no adenopathy;thyroid:  no enlargement/tenderness/nodules or JVD. Lung: Breath sounds CTA. Respirations   unlabored. Symmetrical expansion. Heart: RRR, normal S1, S2. No MRG  Abdomen: Soft, NT, ND. BS present x 4 quadrants. No bruit or organomegaly. Extremities: Pedal pulses 2+ symmetric b/l. Extremities normal, no cyanosis, clubbing, or edema. Musculokeletal: No joint swelling, no muscle tenderness. ROM normal in all joints of extremities. Neurologic: Mental status: Alert and Oriented X3 . Pertinent/ New Labs and Imaging Studies     Imaging Personally Reviewed:    Narrative   EXAMINATION:   CTA OF THE CHEST 11/18/2022 12:35 am       TECHNIQUE:   CTA of the chest was performed after the administration of intravenous   contrast.  Multiplanar reformatted images are provided for review. MIP   images are provided for review. Automated exposure control, iterative   reconstruction, and/or weight based adjustment of the mA/kV was utilized to   reduce the radiation dose to as low as reasonably achievable.        COMPARISON:   07/01/2022       HISTORY:   ORDERING SYSTEM PROVIDED HISTORY: eval aorta   TECHNOLOGIST PROVIDED HISTORY:   Reason for exam:->eval aorta   Decision Support Exception - unselect if not a suspected or confirmed   emergency medical condition->Emergency Medical Condition (MA)   What reading provider will be dictating this exam?->CRC       FINDINGS:   Aorta: No evidence of thoracic aortic aneurysm or dissection. No acute   abnormality of the aorta. Mediastinum: Prominent mediastinal lymph nodes, not significantly changed. The heart and pericardium demonstrate no acute abnormality. Lungs/Pleura: Scattered tree-in-bud nodularity again noted bilaterally,   predominantly in the upper and middle lobes. Left upper lobe pulmonary   nodules are unchanged from prior examination. New 12 x 5 mm right upper lobe   nodule. No pneumothorax or pleural effusion. Upper Abdomen: Please see separately dictated report for findings below the   diaphragm. Soft Tissues/Bones: No acute bone or soft tissue abnormality. Impression   No acute aortic abnormality. Redemonstration of scattered bilateral tree-in-bud nodularity. New right   upper lobe 12 x 5 mm pulmonary nodule. Findings likely represent   infectious/inflammatory change. However, underlying neoplastic process not   excluded. Recommend pulmonary consultation if not previously obtained.    Consider imaging follow-up to resolution         Labs:  Lab Results   Component Value Date/Time    WBC 10.0 11/21/2022 05:48 AM    HGB 13.8 11/21/2022 05:48 AM    HCT 41.9 11/21/2022 05:48 AM    MCV 85.9 11/21/2022 05:48 AM    MCH 28.3 11/21/2022 05:48 AM    MCHC 32.9 11/21/2022 05:48 AM    RDW 13.8 11/21/2022 05:48 AM     11/21/2022 05:48 AM    MPV 12.7 11/21/2022 05:48 AM     Lab Results   Component Value Date/Time     11/21/2022 05:48 AM    K 3.8 11/21/2022 05:48 AM    K 4.2 07/01/2022 12:13 PM     11/21/2022 05:48 AM    CO2 30 11/21/2022 05:48 AM    BUN 12 11/21/2022 05:48 AM    CREATININE 0.8 11/21/2022 05:48 AM    LABALBU 4.0 11/21/2022 05:48 AM    CALCIUM 9.6 11/21/2022 05:48 AM    GFRAA >60 10/11/2022 09:56 AM    LABGLOM >60 11/21/2022 05:48 AM     No results found for: PROTIME, INR  No results for input(s): PROBNP in the last 72 hours. Recent Labs     11/19/22  1418   PROCAL 0.04        Assessment:    Multiple bilateral pulmonary nodules which are relatively stable in comparison to the CT scan in July 2022. With the exception of 1 small new right upper lobe nodule. Differential diagnosis includes inflammatory versus infections versus less likely malignancy  Tree-in-bud changes on CT chest  Diverticulosis      Plan:   Patient would like more conservative measures for follow-up of pulmonary nodules and tree-in-bud changes on CT chest.  Repeat noncontrast CT chest in 3 months for surveillance  General surgery following for diverticulosis. Patient on Linzess. KUB/CT abdomen and pelvis with no acute process. Patient is discharging to home today      This plan of care was reviewed in collaboration with Dr. Yas Pennington  Electronically signed by ELHAM Carter CNP on 11/21/2022    I personally saw, examined, and cared for the patient. Labs, medications, radiographs reviewed. I agree with history exam and plans detailed in NP note.     Paz Eugene MD

## 2022-11-21 NOTE — PROGRESS NOTES
Hospital Medicine    Subjective:  pt seen this am feels well pao diet + bm yesterday      Current Facility-Administered Medications:     polyethylene glycol (GLYCOLAX) packet 17 g, 17 g, Oral, BID, Francisco Liz MD, 17 g at 11/21/22 0945    sennosides-docusate sodium (SENOKOT-S) 8.6-50 MG tablet 2 tablet, 2 tablet, Oral, Daily, Francisco Liz MD, 2 tablet at 11/21/22 0944    simethicone (MYLICON) chewable tablet 80 mg, 80 mg, Oral, Q6H PRN, Francisco Liz MD, 80 mg at 11/19/22 0500    bisacodyl (DULCOLAX) suppository 10 mg, 10 mg, Rectal, Daily, Francisco Liz MD, 10 mg at 11/21/22 0945    morphine (PF) injection 2 mg, 2 mg, IntraVENous, Q4H PRN, Deepthi Gonzalez MD, 2 mg at 11/19/22 1737    glucose chewable tablet 16 g, 4 tablet, Oral, PRN, Deepthi Gonzalez MD    dextrose bolus 10% 125 mL, 125 mL, IntraVENous, PRN **OR** dextrose bolus 10% 250 mL, 250 mL, IntraVENous, PRN, Deepthi Gonzalez MD    glucagon (rDNA) injection 1 mg, 1 mg, SubCUTAneous, PRN, Deepthi Gonzalez MD    dextrose 10 % infusion, , IntraVENous, Continuous PRN, Deepthi Gonzalez MD    trimethobenzamide Gage Idler) injection 200 mg, 200 mg, IntraMUSCular, Q6H PRN, Angela Barry DO, 200 mg at 11/18/22 1253    carvedilol (COREG) tablet 12.5 mg, 12.5 mg, Oral, BID, Angela Barry DO, 12.5 mg at 11/21/22 0944    latanoprost (XALATAN) 0.005 % ophthalmic solution 1 drop, 1 drop, Left Eye, Nightly, Angela Barry DO, 1 drop at 11/20/22 2228    insulin glargine-yfgn (SEMGLEE-YFGN) injection vial 30 Units, 30 Units, SubCUTAneous, Nightly, Angela Barry DO, 30 Units at 11/20/22 2228    linaclotide (LINZESS) capsule 290 mcg (Patient Supplied), 290 mcg, Oral, QAM AC, Angela Barry DO, 290 mcg at 11/21/22 0544    losartan (COZAAR) tablet 100 mg, 100 mg, Oral, Daily, Angela Barry DO, 100 mg at 11/21/22 0944    pravastatin (PRAVACHOL) tablet 20 mg, 20 mg, Oral, Nightly, Angela Barry DO, 20 mg at 11/20/22 2228    insulin lispro (HUMALOG) injection vial 0-8 Units, 0-8 Units, SubCUTAneous, TID WC, J Luis Barry, DO, 2 Units at 11/20/22 0919    insulin lispro (HUMALOG) injection vial 0-4 Units, 0-4 Units, SubCUTAneous, Nightly, J Luis Yuen Malmer, DO    sodium chloride flush 0.9 % injection 5-40 mL, 5-40 mL, IntraVENous, 2 times per day, Mansi Zimmerman, DO, 5 mL at 11/19/22 2029    sodium chloride flush 0.9 % injection 5-40 mL, 5-40 mL, IntraVENous, PRN, J Luis Barry, DO    0.9 % sodium chloride infusion, , IntraVENous, PRN, J Luis Yuen Malmer, DO    enoxaparin (LOVENOX) injection 40 mg, 40 mg, SubCUTAneous, Daily, J Luis Barry, DO, 40 mg at 11/21/22 0944    acetaminophen (TYLENOL) tablet 650 mg, 650 mg, Oral, Q6H PRN **OR** acetaminophen (TYLENOL) suppository 650 mg, 650 mg, Rectal, Q6H PRN, J Luis Barry, DO    0.9 % sodium chloride infusion, , IntraVENous, Continuous, J Luis Barry, DO, Stopped at 11/21/22 1156    oxyCODONE (ROXICODONE) immediate release tablet 2.5 mg, 2.5 mg, Oral, Q4H PRN **OR** oxyCODONE (ROXICODONE) immediate release tablet 5 mg, 5 mg, Oral, Q4H PRN, Brandon White, DO, 5 mg at 11/20/22 2233    Objective:    BP (!) 170/71   Pulse 71   Temp 97.7 °F (36.5 °C) (Oral)   Resp 18   Ht 5' 6\" (1.676 m)   Wt 163 lb (73.9 kg)   SpO2 95%   BMI 26.31 kg/m²     Heart:  reg  Lungs:  ctab  Abd: + bs soft nontender  Extrem:  w/o edema    CBC with Differential:    Lab Results   Component Value Date/Time    WBC 10.0 11/21/2022 05:48 AM    RBC 4.88 11/21/2022 05:48 AM    HGB 13.8 11/21/2022 05:48 AM    HCT 41.9 11/21/2022 05:48 AM     11/21/2022 05:48 AM    MCV 85.9 11/21/2022 05:48 AM    MCH 28.3 11/21/2022 05:48 AM    MCHC 32.9 11/21/2022 05:48 AM    RDW 13.8 11/21/2022 05:48 AM    METASPCT 1.7 09/30/2021 09:02 AM    LYMPHOPCT 10.9 11/19/2022 02:18 PM    MONOPCT 11.7 11/19/2022 02:18 PM    BASOPCT 0.3 11/19/2022 02:18 PM    MONOSABS 1.35 11/19/2022 02:18 PM    LYMPHSABS 1.26 11/19/2022 02:18 PM    EOSABS 0.17 11/19/2022 02:18 PM    BASOSABS 0.03 11/19/2022 02:18 PM     CMP:    Lab Results   Component Value Date/Time     11/21/2022 05:48 AM    K 3.8 11/21/2022 05:48 AM    K 4.2 07/01/2022 12:13 PM     11/21/2022 05:48 AM    CO2 30 11/21/2022 05:48 AM    BUN 12 11/21/2022 05:48 AM    CREATININE 0.8 11/21/2022 05:48 AM    GFRAA >60 10/11/2022 09:56 AM    LABGLOM >60 11/21/2022 05:48 AM    GLUCOSE 78 11/21/2022 05:48 AM    PROT 7.5 11/21/2022 05:48 AM    LABALBU 4.0 11/21/2022 05:48 AM    CALCIUM 9.6 11/21/2022 05:48 AM    BILITOT 0.6 11/21/2022 05:48 AM    ALKPHOS 62 11/21/2022 05:48 AM    AST 42 11/21/2022 05:48 AM    ALT 29 11/21/2022 05:48 AM     Warfarin PT/INR:  No results found for: INR, PROTIME    Assessment:    Principal Problem:    Intractable nausea and vomiting  Resolved Problems:    * No resolved hospital problems.  *      Plan:  Dc home bp control outpt f/u cont Deejay Magallanes DO  12:38 PM  11/21/2022

## 2022-11-21 NOTE — CARE COORDINATION
11/21/2022 - Care Coordination - admitted for intractable nausea and vomiting. General surgery consult done. Spoke with pt in room to discuss discharge plan. PCP is Dr Lio Bazzi. Pharmacy is [facility] on [address] [place] [address]. Lives with his significant other in a 1 floor home with 3 steps to enter. He is independent in ADLs and drives. Denies hx HHC, IAN/ARU and DME. Plan is to return home - he states he has no needs. His SO will provide transportation home. SW/CM will follow.

## 2022-11-21 NOTE — DISCHARGE INSTRUCTIONS
Your information:  Name: Genaro Philip  : 1932    Your instructions: Take all medications as prescribed. Keep follow-up appointments as instructed. Follow strict bowel regiment. (Was taking Dulcolax, Senokot & Glycolax in the hospital)  Continue to check blood sugars as previously at home. What to do after you leave the hospital:    Recommended diet: regular diet and high fiber    Recommended activity: activity as tolerated        The following personal items were collected during your admission and were returned to you:    Belongings  Dental Appliances: None  Vision - Corrective Lenses: Eyeglasses, At home  Hearing Aid: None  Clothing: Footwear, Pajamas, Socks, Undergarments, At bedside  Jewelry: None  Electronic Devices: None  Weapons (Notify Protective Services/Security): None  Home Medications: None  Valuables Given To: Other (Comment) (None at bedside)  Provide Name(s) of Who Valuable(s) Were Given To: pt    Information obtained by:  By signing below, I understand that if any problems occur once I leave the hospital I am to contact Dr. Lori Friend. I understand and acknowledge receipt of the instructions indicated above.

## 2022-11-22 ENCOUNTER — CARE COORDINATION (OUTPATIENT)
Dept: CASE MANAGEMENT | Age: 87
End: 2022-11-22

## 2022-11-22 NOTE — CARE COORDINATION
[facility] Care Transitions Initial Follow Up Call    Call within 2 business days of discharge: Yes    Using [facility] #[id number], Care Transition Nurse attempted initial CT outreach leaving Hipaa VM leaving my outreach info & purpose for call. Patient: Jay Huerta Patient : 1932   MRN: <[id number]>  Reason for Admission: 2022 - 2022 [address] N/V, Hyperglycemia. Discharge Date: 22 RARS: No data recorded  NR  CT    PCP  10:30  H YOLANDA Aparicio 1/3 8:30. STOP taking:  ondansetron 4 MG tablet (ZOFRAN)    Last Discharge [address]       Date Complaint Diagnosis Description Type Department Provider    22 Abdominal Pain Intractable nausea and vomiting . .. ED to Hosp-Admission (Discharged) (ADMITTED) [facility] Shaista Saldaña DO; Chris Tripathi. ..             Maryan Terry RN

## 2022-11-23 ENCOUNTER — OFFICE VISIT (OUTPATIENT)
Dept: PRIMARY CARE CLINIC | Age: 87
End: 2022-11-23

## 2022-11-23 ENCOUNTER — CARE COORDINATION (OUTPATIENT)
Dept: CASE MANAGEMENT | Age: 87
End: 2022-11-23

## 2022-11-23 DIAGNOSIS — Z09 HOSPITAL DISCHARGE FOLLOW-UP: Primary | ICD-10-CM

## 2022-11-23 DIAGNOSIS — F51.01 PRIMARY INSOMNIA: ICD-10-CM

## 2022-11-23 RX ORDER — TRAZODONE HYDROCHLORIDE 50 MG/1
50 TABLET ORAL NIGHTLY PRN
Qty: 30 TABLET | Refills: 4 | Status: SHIPPED | OUTPATIENT
Start: 2022-11-23

## 2022-11-23 NOTE — PROGRESS NOTES
Seen by:  Yuridia Baird DO      Post-Discharge Transitional Care  Follow Up      Aaron Sierra   YOB: 1932    Date of Office Visit:  11/23/2022  Date of Hospital Admission: 11/18/22  Date of Hospital Discharge: 11/21/22  Risk of hospital readmission (high >=14%. Medium >=10%) :No data recorded    Care management risk score Rising risk (score 2-5) and Complex Care (Scores >=6): No Risk Score On File     Non face to face  following discharge, date last encounter closed (first attempt may have been earlier): 11/23/2022    Call initiated 2 business days of discharge: Yes    ASSESSMENT/PLAN:   Hospital discharge follow-up  -     AL DISCHARGE MEDS RECONCILED W/ CURRENT OUTPATIENT MED LIST  Primary insomnia  -     traZODone (DESYREL) 50 MG tablet; Take 1 tablet by mouth nightly as needed for Sleep, Disp-30 tablet, R-4Normal    Medical Decision Making: moderate complexity  Return for Reg Appt. On this date 11/23/2 the emergency room with nausea vomiting abdominal pain and constipation. Seen by pulmonary also because of nodule we do in 3 months and schedule through pulmonary office. He is having bowel movements with the Linzess without if he is unable to have a bowel movement. His biggest problem is sleep. I will give him a medication for that. He did have a CAT scan with a left renal cyst in July 2022 was not mentioned on the present CAT scan. 022 I have spent 30 minutes reviewing previous notes, test results and face to face with the patient discussing the diagnosis and importance of compliance with the treatment plan as well as documenting on the day of the visit. Subjective:   HPI:  Follow up of Hospital problems/diagnosis(es): above    Inpatient course: Discharge summary reviewed- see chart.     Interval history/Current status: abnove noted    Patient Active Problem List   Diagnosis    Essential hypertension    Mixed hyperlipidemia    Uncontrolled type 1 diabetes mellitus with hyperglycemia (HCC)    Primary insomnia    Persistent proteinuria    EKG abnormalities    Coronary artery disease involving native coronary artery of native heart without angina pectoris    Acute cystitis without hematuria    Constipation    Diverticulitis    Adrenal mass (HCC)    Chest pain    Intractable nausea and vomiting    Lung nodule    Cardiomyopathy, unspecified type ([facility] [address]       Medications listed as ordered at the time of discharge from hospital     Medication List            Accurate as of November 23, 2022 11:59 PM. If you have any questions, ask your nurse or doctor. START taking these medications      traZODone 50 MG tablet  Commonly known as: DESYREL  Take 1 tablet by mouth nightly as needed for Sleep  Started by: Laqueta Phalen, DO            CONTINUE taking these medications      * blood glucose test strips strip  Commonly known as: ASCENSIA AUTODISC VI;ONE TOUCH ULTRA TEST VI  1 each by In Vitro route daily As needed. * blood glucose test strips  Test 3 times a day & as needed for symptoms of irregular blood glucose.  Contour next test strips     carvedilol 12.5 MG tablet  Commonly known as: COREG  Take 1 tablet by mouth 2 times daily     cyanocobalamin 50 MCG tablet     FreeStyle Kennedi 2 Sensor Misc  Change sensor every 14 days     * BD Pen Needle Micro U/F 32G X 6 MM Misc  Generic drug: Insulin Pen Needle  Uses with insulin 4 times a day     * Insulin Pen Needle 31G X 5 MM Misc     Insulin Syringes (Disposable) U-100 0.3 ML Misc  1 each by Does not apply route 3 times daily     latanoprost 0.005 % ophthalmic solution  Commonly known as: XALATAN     Levemir FlexTouch 100 UNIT/ML injection pen  Generic drug: insulin detemir  Inject 30 Units into the skin nightly     linaclotide 290 MCG Caps capsule  Commonly known as: LINZESS  Take 1 capsule by mouth every morning (before breakfast)     losartan 100 MG tablet  Commonly known as: COZAAR  Take 1 tablet by mouth daily     NovoLOG FlexPen 100 UNIT/ML injection pen  Generic drug: insulin aspart  Inject 12 Units into the skin 3 times daily (before meals) 12 units 3 times per day     pravastatin 20 MG tablet  Commonly known as: PRAVACHOL  Take 1 tablet by mouth daily     prochlorperazine 10 MG tablet  Commonly known as: COMPAZINE  Take 1 tablet by mouth every 6 hours as needed (nausea)           * This list has 4 medication(s) that are the same as other medications prescribed for you. Read the directions carefully, and ask your doctor or other care provider to review them with you. Where to Get Your Medications        These medications were sent to [facility], [address], [place] [address]      Phone: [phone]   traZODone 50 MG tablet           Medications marked \"taking\" at this time  Outpatient Medications Marked as Taking for the 11/23/22 encounter (Office Visit) with Neda Mcdaniel, DO   Medication Sig Dispense Refill    traZODone (DESYREL) 50 MG tablet Take 1 tablet by mouth nightly as needed for Sleep 30 tablet 4        Medications patient taking as of now reconciled against medications ordered at time of hospital discharge: Yes    A comprehensive review of systems was negative except for what was noted in the HPI.     Objective:    BP (!) 152/78   Temp 97.9 °F (36.6 °C) (Oral)   Ht 5' 6\" (1.676 m)   Wt 161 lb (73 kg)   BMI 25.99 kg/m²   General Appearance: alert and oriented to person, place and time, well developed and well- nourished, in no acute distress  Skin: warm and dry, no rash or erythema  Head: normocephalic and atraumatic  Eyes: pupils equal, round, and reactive to light, extraocular eye movements intact, conjunctivae normal  ENT: tympanic membrane, external ear and ear canal normal bilaterally, nose without deformity, nasal mucosa and turbinates normal without polyps  Neck: supple and non-tender without mass, no thyromegaly or thyroid nodules, no cervical lymphadenopathy  Pulmonary/Chest: clear to auscultation bilaterally- no wheezes, rales or rhonchi, normal air movement, no respiratory distress  Cardiovascular: normal rate, regular rhythm, normal S1 and S2, no murmurs, rubs, clicks, or gallops, distal pulses intact, no carotid bruits  Abdomen: soft, non-tender, non-distended, normal bowel sounds, no masses or organomegaly  Extremities: no cyanosis, clubbing or edema  Musculoskeletal: normal range of motion, no joint swelling, deformity or tenderness  Neurologic: reflexes normal and symmetric, no cranial nerve deficit, gait, coordination and speech normal      Medication for sleep. Follow-up with pulmonary. Cardiology. Continue Linzess. Low-fat low sugar diet. I educated the patient about all medications. Make sure they were correct and educated  on the risk associated with missing meds or taking them incorrectly. A list of medications is being sent home with patient today. Check blood pressure at home twice a day. Low-salt low caffeine diet. Call if systolic blood pressure is above 663 and diastolic blood pressures above 85. Only use a upper arm digital cuff. I informed patient about the risk associated with noncompliance of medication and taking medications incorrectly. Appropriate follow-up with myself and all specialist.  Encourage family members to take active role in assisting with medications and medical care. If any confusion should develop to notify my office immediately to avoid risk of worsening medical condition      A great deal of time spent reviewing medications, diet, exercise, social issues. Also reviewing the chart before entering the room with patient and finishing charting after leaving patient's room. More than half of that time was spent face to face with the patient in counseling and coordinating care.       An electronic signature was used to authenticate this note.  --Alexander Davey, DO

## 2022-11-23 NOTE — CARE COORDINATION
[facility] Care Transitions Initial Follow Up Call    Call within 2 business days of discharge: Yes    Care Transition Nurse attempted second initial CT outreach. Immediate hang up. Attended today's PCP appt. CTN s/o. Patient: Susan Stevens Patient : 1932   MRN: <[id number]>  Reason for Admission: 2022 - 2022 [address] N/V, Hyperglycemia. Discharge Date: 22 RARS: No data recorded  NR  CT     PCP  10:30. Attended. MINAL Aparicio NP 1/3 8:30. STOP taking:  ondansetron 4 MG tablet (ZOFRAN)    Last Discharge [address]       Date Complaint Diagnosis Description Type Department Provider    22 Abdominal Pain Intractable nausea and vomiting . .. ED to Hosp-Admission (Discharged) (ADMITTED) [facility] Adolfo Sigala DO; Talya Garcia. ..             Radha Herrera RN

## 2022-11-24 VITALS
SYSTOLIC BLOOD PRESSURE: 152 MMHG | DIASTOLIC BLOOD PRESSURE: 78 MMHG | BODY MASS INDEX: 25.88 KG/M2 | HEIGHT: 66 IN | WEIGHT: 161 LBS | TEMPERATURE: 97.9 F

## 2022-11-24 ASSESSMENT — PATIENT HEALTH QUESTIONNAIRE - PHQ9
SUM OF ALL RESPONSES TO PHQ QUESTIONS 1-9: 0
2. FEELING DOWN, DEPRESSED OR HOPELESS: 0
1. LITTLE INTEREST OR PLEASURE IN DOING THINGS: 0
SUM OF ALL RESPONSES TO PHQ9 QUESTIONS 1 & 2: 0

## 2022-12-16 DIAGNOSIS — E78.2 MIXED HYPERLIPIDEMIA: ICD-10-CM

## 2022-12-17 RX ORDER — PRAVASTATIN SODIUM 20 MG
20 TABLET ORAL DAILY
Qty: 90 TABLET | Refills: 3 | Status: SHIPPED | OUTPATIENT
Start: 2022-12-17

## 2022-12-21 NOTE — DISCHARGE SUMMARY
[address]                  [address] [facility],  [facility]                               DISCHARGE SUMMARY    PATIENT NAME: Trevor Reyes                      :        1932  MED REC NO:   [id number]                            ROOM:       [id number]  ACCOUNT NO:   [de-identified]                           ADMIT DATE: 2022  PROVIDER:     Ariadne Arthur DO                  DISCHARGE DATE:  2022    DISCHARGE DIAGNOSES:  1. Intractable nausea, vomiting. 2.  Chronic constipation. 3.  Coronary artery disease. 4.  Insulin-requiring diabetes mellitus. 5.  Elevated cholesterol. 6.  Hypertension. HOSPITAL COURSE:  The patient is a 20-year-old  male who  presented to the emergency room complaining of acute-onset abdominal  pain, nausea, vomiting. He was seen in the emergency room. CT of the  abdomen revealed no acute abdominopelvic abnormality. CTA of the chest  revealed scattered bilateral tree-in-bud nodularity, new right upper  lobe pulmonary nodule. The patient was assigned to observation status. He was seen by Surgery and Pulmonary. He was treated for constipation  with improvement in his symptoms. Pulmonary reviewed CT findings,  recommended outpatient followup in three months for a repeat CT scan. The patient was discharged to home in stable condition on 2022. DISCHARGE MEDICATIONS:  As per discharge med rec which include:  1. Carvedilol. 2.  Cyanocobalamin. 3.  Xalatan eyedrops. 4.  Levemir. 5.  Linzess. 6.  Losartan. 7.  NovoLog. 8.  Compazine p.r.n. The patient instructed to follow up with Dr. Selvin Castro, call office  for appointment.         Sara Chapman DO    D: 2022 14:10:07       T: 2022 14:12:34     [email or web address]  Job#: [id number]     Doc#: [id number]    CC:

## 2023-01-03 ENCOUNTER — OFFICE VISIT (OUTPATIENT)
Dept: ENDOCRINOLOGY | Age: 88
End: 2023-01-03
Payer: COMMERCIAL

## 2023-01-03 VITALS
SYSTOLIC BLOOD PRESSURE: 181 MMHG | HEART RATE: 59 BPM | DIASTOLIC BLOOD PRESSURE: 71 MMHG | BODY MASS INDEX: 25.88 KG/M2 | WEIGHT: 161 LBS | HEIGHT: 66 IN

## 2023-01-03 DIAGNOSIS — E78.5 HYPERLIPIDEMIA, UNSPECIFIED HYPERLIPIDEMIA TYPE: ICD-10-CM

## 2023-01-03 DIAGNOSIS — E78.2 MIXED HYPERLIPIDEMIA: Chronic | ICD-10-CM

## 2023-01-03 DIAGNOSIS — M81.0 AGE-RELATED OSTEOPOROSIS WITHOUT CURRENT PATHOLOGICAL FRACTURE: ICD-10-CM

## 2023-01-03 DIAGNOSIS — E11.65 TYPE 2 DIABETES MELLITUS WITH HYPERGLYCEMIA, WITH LONG-TERM CURRENT USE OF INSULIN (HCC): Primary | ICD-10-CM

## 2023-01-03 DIAGNOSIS — Z91.119 DIETARY NONCOMPLIANCE: ICD-10-CM

## 2023-01-03 DIAGNOSIS — Z79.4 TYPE 2 DIABETES MELLITUS WITH HYPERGLYCEMIA, WITH LONG-TERM CURRENT USE OF INSULIN (HCC): Primary | ICD-10-CM

## 2023-01-03 DIAGNOSIS — E10.65 UNCONTROLLED TYPE 1 DIABETES MELLITUS WITH HYPERGLYCEMIA (HCC): Chronic | ICD-10-CM

## 2023-01-03 DIAGNOSIS — I25.10 CORONARY ARTERY DISEASE INVOLVING NATIVE CORONARY ARTERY OF NATIVE HEART WITHOUT ANGINA PECTORIS: Chronic | ICD-10-CM

## 2023-01-03 LAB
ALBUMIN SERPL-MCNC: 4.6 G/DL (ref 3.5–5.2)
ALP BLD-CCNC: 79 U/L (ref 40–129)
ALT SERPL-CCNC: 20 U/L (ref 0–40)
ANION GAP SERPL CALCULATED.3IONS-SCNC: 11 MMOL/L (ref 7–16)
AST SERPL-CCNC: 23 U/L (ref 0–39)
BACTERIA: NORMAL /HPF
BASOPHILS ABSOLUTE: 0.05 E9/L (ref 0–0.2)
BASOPHILS RELATIVE PERCENT: 0.6 % (ref 0–2)
BILIRUB SERPL-MCNC: 0.6 MG/DL (ref 0–1.2)
BILIRUBIN URINE: NEGATIVE
BLOOD, URINE: NEGATIVE
BUN BLDV-MCNC: 21 MG/DL (ref 6–23)
CALCIUM SERPL-MCNC: 9.9 MG/DL (ref 8.6–10.2)
CHLORIDE BLD-SCNC: 100 MMOL/L (ref 98–107)
CHOLESTEROL, TOTAL: 205 MG/DL (ref 0–199)
CLARITY: CLEAR
CO2: 28 MMOL/L (ref 22–29)
COLOR: YELLOW
CREAT SERPL-MCNC: 1 MG/DL (ref 0.7–1.2)
EOSINOPHILS ABSOLUTE: 1.35 E9/L (ref 0.05–0.5)
EOSINOPHILS RELATIVE PERCENT: 16.1 % (ref 0–6)
GFR SERPL CREATININE-BSD FRML MDRD: >60 ML/MIN/1.73
GLUCOSE BLD-MCNC: 186 MG/DL (ref 74–99)
GLUCOSE URINE: NEGATIVE MG/DL
HBA1C MFR BLD: 8 %
HBA1C MFR BLD: 8.8 % (ref 4–5.6)
HCT VFR BLD CALC: 44.4 % (ref 37–54)
HDLC SERPL-MCNC: 47 MG/DL
HEMOGLOBIN: 14.1 G/DL (ref 12.5–16.5)
IMMATURE GRANULOCYTES #: 0.02 E9/L
IMMATURE GRANULOCYTES %: 0.2 % (ref 0–5)
KETONES, URINE: NEGATIVE MG/DL
LDL CHOLESTEROL CALCULATED: 131 MG/DL (ref 0–99)
LEUKOCYTE ESTERASE, URINE: NEGATIVE
LYMPHOCYTES ABSOLUTE: 1.66 E9/L (ref 1.5–4)
LYMPHOCYTES RELATIVE PERCENT: 19.8 % (ref 20–42)
MCH RBC QN AUTO: 28.4 PG (ref 26–35)
MCHC RBC AUTO-ENTMCNC: 31.8 % (ref 32–34.5)
MCV RBC AUTO: 89.5 FL (ref 80–99.9)
MICROALBUMIN UR-MCNC: 273.5 MG/L
MONOCYTES ABSOLUTE: 0.96 E9/L (ref 0.1–0.95)
MONOCYTES RELATIVE PERCENT: 11.5 % (ref 2–12)
NEUTROPHILS ABSOLUTE: 4.34 E9/L (ref 1.8–7.3)
NEUTROPHILS RELATIVE PERCENT: 51.8 % (ref 43–80)
NITRITE, URINE: NEGATIVE
PDW BLD-RTO: 14.5 FL (ref 11.5–15)
PH UA: 6 (ref 5–9)
PLATELET # BLD: 178 E9/L (ref 130–450)
PMV BLD AUTO: ABNORMAL FL (ref 7–12)
POTASSIUM SERPL-SCNC: 5.4 MMOL/L (ref 3.5–5)
PROTEIN UA: 30 MG/DL
RBC # BLD: 4.96 E12/L (ref 3.8–5.8)
RBC UA: NORMAL /HPF (ref 0–2)
SODIUM BLD-SCNC: 139 MMOL/L (ref 132–146)
SPECIFIC GRAVITY UA: 1.02 (ref 1–1.03)
TOTAL PROTEIN: 8.3 G/DL (ref 6.4–8.3)
TRIGL SERPL-MCNC: 136 MG/DL (ref 0–149)
UROBILINOGEN, URINE: 0.2 E.U./DL
VITAMIN D 25-HYDROXY: 45 NG/ML (ref 30–100)
VLDLC SERPL CALC-MCNC: 27 MG/DL
WBC # BLD: 8.4 E9/L (ref 4.5–11.5)
WBC UA: NORMAL /HPF (ref 0–5)

## 2023-01-03 PROCEDURE — 83036 HEMOGLOBIN GLYCOSYLATED A1C: CPT | Performed by: NURSE PRACTITIONER

## 2023-01-03 PROCEDURE — 1123F ACP DISCUSS/DSCN MKR DOCD: CPT | Performed by: NURSE PRACTITIONER

## 2023-01-03 PROCEDURE — 99214 OFFICE O/P EST MOD 30 MIN: CPT | Performed by: NURSE PRACTITIONER
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
## 2023-01-13 ENCOUNTER — OFFICE VISIT (OUTPATIENT)
Dept: PRIMARY CARE CLINIC | Age: 88
End: 2023-01-13

## 2023-01-13 VITALS
HEIGHT: 66 IN | TEMPERATURE: 97.1 F | BODY MASS INDEX: 26.58 KG/M2 | SYSTOLIC BLOOD PRESSURE: 148 MMHG | WEIGHT: 165.4 LBS | DIASTOLIC BLOOD PRESSURE: 82 MMHG

## 2023-01-13 DIAGNOSIS — F51.01 PRIMARY INSOMNIA: ICD-10-CM

## 2023-01-13 DIAGNOSIS — I42.9 CARDIOMYOPATHY, UNSPECIFIED TYPE (HCC): ICD-10-CM

## 2023-01-13 DIAGNOSIS — E53.8 VITAMIN B 12 DEFICIENCY: ICD-10-CM

## 2023-01-13 DIAGNOSIS — E03.9 ACQUIRED HYPOTHYROIDISM: ICD-10-CM

## 2023-01-13 DIAGNOSIS — E78.2 MIXED HYPERLIPIDEMIA: Primary | ICD-10-CM

## 2023-01-13 DIAGNOSIS — I10 ESSENTIAL HYPERTENSION: Chronic | ICD-10-CM

## 2023-01-13 DIAGNOSIS — M81.0 AGE-RELATED OSTEOPOROSIS WITHOUT CURRENT PATHOLOGICAL FRACTURE: ICD-10-CM

## 2023-01-13 DIAGNOSIS — D69.6 THROMBOCYTOPENIA (HCC): ICD-10-CM

## 2023-01-13 PROBLEM — E27.8 ADRENAL MASS (HCC): Status: RESOLVED | Noted: 2021-12-15 | Resolved: 2023-01-13

## 2023-01-13 RX ORDER — PRAVASTATIN SODIUM 40 MG
40 TABLET ORAL DAILY
Qty: 90 TABLET | Refills: 5 | Status: SHIPPED | OUTPATIENT
Start: 2023-01-13

## 2023-01-13 RX ORDER — TRAZODONE HYDROCHLORIDE 100 MG/1
100 TABLET ORAL NIGHTLY PRN
Qty: 90 TABLET | Refills: 5 | Status: SHIPPED | OUTPATIENT
Start: 2023-01-13

## 2023-01-13 ASSESSMENT — PATIENT HEALTH QUESTIONNAIRE - PHQ9
SUM OF ALL RESPONSES TO PHQ QUESTIONS 1-9: 0
SUM OF ALL RESPONSES TO PHQ QUESTIONS 1-9: 0
SUM OF ALL RESPONSES TO PHQ9 QUESTIONS 1 & 2: 0
SUM OF ALL RESPONSES TO PHQ QUESTIONS 1-9: 0
1. LITTLE INTEREST OR PLEASURE IN DOING THINGS: 0
SUM OF ALL RESPONSES TO PHQ QUESTIONS 1-9: 0
2. FEELING DOWN, DEPRESSED OR HOPELESS: 0
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
## 2023-01-20 ENCOUNTER — OFFICE VISIT (OUTPATIENT)
Dept: CARDIOLOGY CLINIC | Age: 88
End: 2023-01-20
Payer: COMMERCIAL

## 2023-01-20 VITALS — HEIGHT: 65 IN | BODY MASS INDEX: 27.66 KG/M2 | WEIGHT: 166 LBS

## 2023-01-20 DIAGNOSIS — I44.7 LBBB (LEFT BUNDLE BRANCH BLOCK): ICD-10-CM

## 2023-01-20 DIAGNOSIS — I42.9 CARDIOMYOPATHY, UNSPECIFIED TYPE (HCC): ICD-10-CM

## 2023-01-20 DIAGNOSIS — I10 PRIMARY HYPERTENSION: ICD-10-CM

## 2023-01-20 DIAGNOSIS — I25.118 CORONARY ARTERY DISEASE OF NATIVE ARTERY OF NATIVE HEART WITH STABLE ANGINA PECTORIS (HCC): Primary | ICD-10-CM

## 2023-01-20 PROCEDURE — 1123F ACP DISCUSS/DSCN MKR DOCD: CPT | Performed by: INTERNAL MEDICINE

## 2023-01-20 PROCEDURE — 99214 OFFICE O/P EST MOD 30 MIN: CPT | Performed by: INTERNAL MEDICINE

## 2023-01-20 PROCEDURE — 93000 ELECTROCARDIOGRAM COMPLETE: CPT | Performed by: INTERNAL MEDICINE

## 2023-01-20 RX ORDER — ISOSORBIDE MONONITRATE 30 MG/1
30 TABLET, EXTENDED RELEASE ORAL DAILY
Qty: 30 TABLET | Refills: 3 | Status: SHIPPED | OUTPATIENT
Start: 2023-01-20

## 2023-01-20 RX ORDER — BRIMONIDINE TARTRATE 2 MG/ML
SOLUTION/ DROPS OPHTHALMIC
COMMUNITY
Start: 2023-01-13

## 2023-01-20 RX ORDER — ASPIRIN 81 MG/1
81 TABLET ORAL DAILY
Qty: 30 TABLET | Refills: 5 | Status: SHIPPED | OUTPATIENT
Start: 2023-01-20
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
## 2023-02-13 PROBLEM — E11.9 DIABETES MELLITUS WITHOUT COMPLICATION (HCC): Status: ACTIVE | Noted: 2022-04-28

## 2023-02-13 PROBLEM — H02.053 TRICHIASIS OF RIGHT EYELID: Status: ACTIVE | Noted: 2022-03-29

## 2023-02-13 PROBLEM — H40.1120 PRIMARY OPEN ANGLE GLAUCOMA OF LEFT EYE: Status: ACTIVE | Noted: 2021-08-19

## 2023-05-02 ENCOUNTER — OFFICE VISIT (OUTPATIENT)
Dept: FAMILY MEDICINE CLINIC | Age: 88
End: 2023-05-02
Payer: MEDICARE

## 2023-05-02 VITALS
OXYGEN SATURATION: 96 % | BODY MASS INDEX: 26.66 KG/M2 | DIASTOLIC BLOOD PRESSURE: 66 MMHG | HEIGHT: 65 IN | RESPIRATION RATE: 20 BRPM | SYSTOLIC BLOOD PRESSURE: 132 MMHG | TEMPERATURE: 98.6 F | WEIGHT: 160 LBS | HEART RATE: 72 BPM

## 2023-05-02 DIAGNOSIS — E78.2 MIXED HYPERLIPIDEMIA: ICD-10-CM

## 2023-05-02 DIAGNOSIS — E03.9 ACQUIRED HYPOTHYROIDISM: ICD-10-CM

## 2023-05-02 DIAGNOSIS — M81.0 AGE-RELATED OSTEOPOROSIS WITHOUT CURRENT PATHOLOGICAL FRACTURE: ICD-10-CM

## 2023-05-02 DIAGNOSIS — E53.8 VITAMIN B 12 DEFICIENCY: ICD-10-CM

## 2023-05-02 DIAGNOSIS — R05.9 COUGH, UNSPECIFIED TYPE: ICD-10-CM

## 2023-05-02 DIAGNOSIS — D69.6 THROMBOCYTOPENIA (HCC): ICD-10-CM

## 2023-05-02 DIAGNOSIS — J20.9 ACUTE BRONCHITIS, UNSPECIFIED ORGANISM: Primary | ICD-10-CM

## 2023-05-02 DIAGNOSIS — I10 ESSENTIAL HYPERTENSION: Chronic | ICD-10-CM

## 2023-05-02 LAB
ALBUMIN SERPL-MCNC: 4.2 G/DL (ref 3.5–5.2)
ALP SERPL-CCNC: 68 U/L (ref 40–129)
ALT SERPL-CCNC: 20 U/L (ref 0–40)
ANION GAP SERPL CALCULATED.3IONS-SCNC: 11 MMOL/L (ref 7–16)
AST SERPL-CCNC: 29 U/L (ref 0–39)
BACTERIA URNS QL MICRO: NORMAL /HPF
BASOPHILS # BLD: 0.04 E9/L (ref 0–0.2)
BASOPHILS NFR BLD: 0.7 % (ref 0–2)
BILIRUB SERPL-MCNC: 0.3 MG/DL (ref 0–1.2)
BILIRUB UR QL STRIP: NEGATIVE
BUN SERPL-MCNC: 13 MG/DL (ref 6–23)
CALCIUM SERPL-MCNC: 9.3 MG/DL (ref 8.6–10.2)
CHLORIDE SERPL-SCNC: 98 MMOL/L (ref 98–107)
CHOLESTEROL, TOTAL: 153 MG/DL (ref 0–199)
CLARITY UR: CLEAR
CO2 SERPL-SCNC: 28 MMOL/L (ref 22–29)
COLOR UR: YELLOW
CREAT SERPL-MCNC: 1 MG/DL (ref 0.7–1.2)
EOSINOPHIL # BLD: 0.83 E9/L (ref 0.05–0.5)
EOSINOPHIL NFR BLD: 14.7 % (ref 0–6)
EPI CELLS #/AREA URNS HPF: NORMAL /HPF
ERYTHROCYTE [DISTWIDTH] IN BLOOD BY AUTOMATED COUNT: 14.8 FL (ref 11.5–15)
GLUCOSE SERPL-MCNC: 169 MG/DL (ref 74–99)
GLUCOSE UR STRIP-MCNC: NEGATIVE MG/DL
HBA1C MFR BLD: 7.4 % (ref 4–5.6)
HCT VFR BLD AUTO: 48.6 % (ref 37–54)
HDLC SERPL-MCNC: 40 MG/DL
HGB BLD-MCNC: 14.7 G/DL (ref 12.5–16.5)
HGB UR QL STRIP: NEGATIVE
IMM GRANULOCYTES # BLD: 0.01 E9/L
IMM GRANULOCYTES NFR BLD: 0.2 % (ref 0–5)
KETONES UR STRIP-MCNC: NEGATIVE MG/DL
LDLC SERPL CALC-MCNC: 92 MG/DL (ref 0–99)
LEUKOCYTE ESTERASE UR QL STRIP: NEGATIVE
LYMPHOCYTES # BLD: 1.08 E9/L (ref 1.5–4)
LYMPHOCYTES NFR BLD: 19.2 % (ref 20–42)
Lab: NORMAL
MCH RBC QN AUTO: 27.5 PG (ref 26–35)
MCHC RBC AUTO-ENTMCNC: 30.2 % (ref 32–34.5)
MCV RBC AUTO: 91 FL (ref 80–99.9)
MICROALBUMIN UR-MCNC: 789.4 MG/L
MONOCYTES # BLD: 1.3 E9/L (ref 0.1–0.95)
MONOCYTES NFR BLD: 23.1 % (ref 2–12)
NEUTROPHILS # BLD: 2.37 E9/L (ref 1.8–7.3)
NEUTS SEG NFR BLD: 42.1 % (ref 43–80)
NITRITE UR QL STRIP: NEGATIVE
PERFORMING INSTRUMENT: NORMAL
PH UR STRIP: 7 [PH] (ref 5–9)
PLATELET # BLD AUTO: 72 E9/L (ref 130–450)
PLATELET CONFIRMATION: NORMAL
PMV BLD AUTO: ABNORMAL FL (ref 7–12)
POTASSIUM SERPL-SCNC: 5.1 MMOL/L (ref 3.5–5)
PROT SERPL-MCNC: 8.1 G/DL (ref 6.4–8.3)
PROT UR STRIP-MCNC: 100 MG/DL
QC PASS/FAIL: NORMAL
RBC # BLD AUTO: 5.34 E12/L (ref 3.8–5.8)
RBC #/AREA URNS HPF: NORMAL /HPF (ref 0–2)
SARS-COV-2, POC: NORMAL
SODIUM SERPL-SCNC: 137 MMOL/L (ref 132–146)
SP GR UR STRIP: 1.02 (ref 1–1.03)
T4 SERPL-MCNC: 7.4 MCG/DL (ref 4.5–11.7)
TRIGL SERPL-MCNC: 106 MG/DL (ref 0–149)
TSH SERPL-MCNC: 2.36 UIU/ML (ref 0.27–4.2)
UROBILINOGEN UR STRIP-ACNC: 0.2 E.U./DL
VIT B12 SERPL-MCNC: 536 PG/ML (ref 211–946)
VITAMIN D 25-HYDROXY: 30 NG/ML (ref 30–100)
VLDLC SERPL CALC-MCNC: 21 MG/DL
WBC # BLD: 5.6 E9/L (ref 4.5–11.5)
WBC #/AREA URNS HPF: NORMAL /HPF (ref 0–5)

## 2023-05-02 PROCEDURE — 99204 OFFICE O/P NEW MOD 45 MIN: CPT | Performed by: PHYSICIAN ASSISTANT

## 2023-05-02 PROCEDURE — 1123F ACP DISCUSS/DSCN MKR DOCD: CPT | Performed by: PHYSICIAN ASSISTANT

## 2023-05-02 PROCEDURE — 3006F CXR DOC REV: CPT | Performed by: PHYSICIAN ASSISTANT

## 2023-05-02 PROCEDURE — 87426 SARSCOV CORONAVIRUS AG IA: CPT | Performed by: PHYSICIAN ASSISTANT

## 2023-05-02 RX ORDER — BENZONATATE 100 MG/1
100 CAPSULE ORAL 3 TIMES DAILY PRN
Qty: 21 CAPSULE | Refills: 0 | Status: SHIPPED | OUTPATIENT
Start: 2023-05-02 | End: 2023-05-09

## 2023-05-02 RX ORDER — DOXYCYCLINE HYCLATE 100 MG
100 TABLET ORAL 2 TIMES DAILY
Qty: 20 TABLET | Refills: 0 | Status: SHIPPED | OUTPATIENT
Start: 2023-05-02 | End: 2023-05-12

## 2023-05-02 NOTE — PROGRESS NOTES
23  Serina Jaquez : 1932 Sex: male  Age 80 y.o. Subjective:  Chief Complaint   Patient presents with    Sinus Problem    Chest Congestion    Cough    Drainage         HPI:   Serina Jaquez , 80 y.o. male presents to express care for evaluation of cough, congestion, drainage, sore throat    HPI  30-year-old male presents to express care for evaluation of cough, congestion, drainage, sore throat. The patient's had the symptoms ongoing for the last couple of days. Essentially started on Friday with a little bit of congestion progressed over the weekend. The patient is not really having much sputum production with the cough. The patient has no low-grade temperatures with a Tmax of 99.6. The patient has had COVID-vaccine. The patient has had boosters. The patient is not having any shortness of breath. The patient does note some chest wall pain when he coughs. No other times does he have any chest pain. The patient is not having any GI symptoms. ROS:   Unless otherwise stated in this report the patient's positive and negative responses for review of systems for constitutional, eyes, ENT, cardiovascular, respiratory, gastrointestinal, neurological, , musculoskeletal, and integument systems and related systems to the presenting problem are either stated in the history of present illness or were not pertinent or were negative for the symptoms and/or complaints related to the presenting medical problem. Positives and pertinent negatives as per HPI. All others reviewed and are negative.       PMH:     Past Medical History:   Diagnosis Date    CAD (coronary artery disease) 2019    Chronic pain syndrome     Constipation     Degenerative joint disease involving multiple joints     Diabetes ([facility] [address]     Diabetic neuropathy (HCC)     Glaucoma     Left eye only    Hyperlipidemia     Hypertension     Insomnia     Multiple vessel coronary artery disease     Osteoarthritis     wrist

## 2023-05-03 ENCOUNTER — OFFICE VISIT (OUTPATIENT)
Dept: ENDOCRINOLOGY | Age: 88
End: 2023-05-03
Payer: MEDICARE

## 2023-05-03 VITALS
RESPIRATION RATE: 16 BRPM | HEART RATE: 75 BPM | WEIGHT: 158 LBS | BODY MASS INDEX: 26.33 KG/M2 | HEIGHT: 65 IN | DIASTOLIC BLOOD PRESSURE: 82 MMHG | SYSTOLIC BLOOD PRESSURE: 163 MMHG

## 2023-05-03 DIAGNOSIS — E78.5 HYPERLIPIDEMIA, UNSPECIFIED HYPERLIPIDEMIA TYPE: ICD-10-CM

## 2023-05-03 DIAGNOSIS — Z79.4 TYPE 2 DIABETES MELLITUS WITH HYPERGLYCEMIA, WITH LONG-TERM CURRENT USE OF INSULIN (HCC): ICD-10-CM

## 2023-05-03 DIAGNOSIS — E11.65 TYPE 2 DIABETES MELLITUS WITH HYPERGLYCEMIA, WITH LONG-TERM CURRENT USE OF INSULIN (HCC): ICD-10-CM

## 2023-05-03 DIAGNOSIS — E11.9 TYPE 2 DIABETES MELLITUS WITHOUT COMPLICATION, WITH LONG-TERM CURRENT USE OF INSULIN (HCC): Primary | ICD-10-CM

## 2023-05-03 DIAGNOSIS — Z79.4 TYPE 2 DIABETES MELLITUS WITHOUT COMPLICATION, WITH LONG-TERM CURRENT USE OF INSULIN (HCC): Primary | ICD-10-CM

## 2023-05-03 DIAGNOSIS — Z91.119 DIETARY NONCOMPLIANCE: ICD-10-CM

## 2023-05-03 PROCEDURE — 1123F ACP DISCUSS/DSCN MKR DOCD: CPT | Performed by: NURSE PRACTITIONER

## 2023-05-03 PROCEDURE — 3051F HG A1C>EQUAL 7.0%<8.0%: CPT | Performed by: NURSE PRACTITIONER

## 2023-05-03 PROCEDURE — 99214 OFFICE O/P EST MOD 30 MIN: CPT | Performed by: NURSE PRACTITIONER

## 2023-05-03 RX ORDER — INSULIN LISPRO 100 [IU]/ML
INJECTION, SOLUTION INTRAVENOUS; SUBCUTANEOUS
Qty: 15 ADJUSTABLE DOSE PRE-FILLED PEN SYRINGE | Refills: 3 | Status: SHIPPED | OUTPATIENT
Start: 2023-05-03

## 2023-05-03 NOTE — PROGRESS NOTES
discharge, or incontinence. Skin: denied any rash, ulcer, Hirsute, or hyperpigmentation. MSK: denied any joint deformity, joint pain/swelling, muscle pain, or back pain. Neuro: no numbness, no tingling, no weakness    OBJECTIVE    BP (!) 163/82   Pulse 75   Resp 16   Ht 5' 5\" (1.651 m)   Wt 158 lb (71.7 kg)   BMI 26.29 kg/m²   BP Readings from Last 4 Encounters:   05/03/23 (!) 163/82   05/02/23 132/66   02/13/23 (!) 145/68   01/13/23 (!) 148/82     Wt Readings from Last 6 Encounters:   05/03/23 158 lb (71.7 kg)   05/02/23 160 lb (72.6 kg)   02/13/23 164 lb (74.4 kg)   01/20/23 166 lb (75.3 kg)   01/13/23 165 lb 6.4 oz (75 kg)   01/03/23 161 lb (73 kg)       Physical examination:  General: awake alert, oriented x3, no abnormal position or movements. HEENT: normocephalic non-traumatic, no exophthalmos   Neck: supple, no LN enlargement, no thyromegaly, no thyroid tenderness, no JVD. Pulm: Clear equal air entry no added sounds, no wheezing or rhonchi    CVS: S1 + S2, no murmur, no heave. Dorsalis pedis pulse palpable   Abd: soft lax, no tenderness, no organomegaly, audible bowel sounds. Skin: warm, no lesions, no rash.  No callus, no Ulcers, No acanthosis nigricans  Musculoskeletal: No back tenderness, no kyphosis/scoliosis    Neuro: CN intact,  sensation decreased bilateral , muscle power normal  Psych: normal mood, and affect    Review of Laboratory Data:  I personally reviewed the following lab:  Lab Results   Component Value Date/Time    WBC 5.6 05/02/2023 07:16 AM    RBC 5.34 05/02/2023 07:16 AM    HGB 14.7 05/02/2023 07:16 AM    HCT 48.6 05/02/2023 07:16 AM    MCV 91.0 05/02/2023 07:16 AM    MCH 27.5 05/02/2023 07:16 AM    MCHC 30.2 (L) 05/02/2023 07:16 AM    RDW 14.8 05/02/2023 07:16 AM    PLT 72 (L) 05/02/2023 07:16 AM    MPV NOT CALC 05/02/2023 07:16 AM      Lab Results   Component Value Date/Time     05/02/2023 07:16 AM    K 5.1 (H) 05/02/2023 07:16 AM    K 4.2 07/01/2022 12:13 PM    CO2 28

## 2023-05-08 RX ORDER — ISOSORBIDE MONONITRATE 30 MG/1
TABLET, EXTENDED RELEASE ORAL
Qty: 30 TABLET | Refills: 5 | Status: SHIPPED | OUTPATIENT
Start: 2023-05-08

## 2023-05-09 DIAGNOSIS — Z79.4 TYPE 2 DIABETES MELLITUS WITH HYPERGLYCEMIA, WITH LONG-TERM CURRENT USE OF INSULIN (HCC): ICD-10-CM

## 2023-05-09 DIAGNOSIS — E11.65 TYPE 2 DIABETES MELLITUS WITH HYPERGLYCEMIA, WITH LONG-TERM CURRENT USE OF INSULIN (HCC): ICD-10-CM

## 2023-05-10 RX ORDER — INSULIN LISPRO 100 [IU]/ML
INJECTION, SOLUTION INTRAVENOUS; SUBCUTANEOUS
Qty: 60 ML | Refills: 3 | Status: SHIPPED | OUTPATIENT
Start: 2023-05-10

## 2023-05-10 RX ORDER — GLUCOSAM/CHON-MSM1/C/MANG/BOSW 500-416.6
1 TABLET ORAL 3 TIMES DAILY
Qty: 100 EACH | Refills: 12 | Status: SHIPPED | OUTPATIENT
Start: 2023-05-10

## 2023-05-12 ENCOUNTER — OFFICE VISIT (OUTPATIENT)
Dept: PRIMARY CARE CLINIC | Age: 88
End: 2023-05-12

## 2023-05-12 VITALS
BODY MASS INDEX: 26.56 KG/M2 | DIASTOLIC BLOOD PRESSURE: 82 MMHG | WEIGHT: 159.4 LBS | SYSTOLIC BLOOD PRESSURE: 138 MMHG | TEMPERATURE: 97.9 F | HEIGHT: 65 IN

## 2023-05-12 DIAGNOSIS — I10 ESSENTIAL HYPERTENSION: Chronic | ICD-10-CM

## 2023-05-12 DIAGNOSIS — J20.8 ACUTE BRONCHITIS DUE TO OTHER SPECIFIED ORGANISMS: Primary | ICD-10-CM

## 2023-05-12 DIAGNOSIS — D69.6 THROMBOCYTOPENIA (HCC): ICD-10-CM

## 2023-05-12 DIAGNOSIS — E78.2 MIXED HYPERLIPIDEMIA: Chronic | ICD-10-CM

## 2023-05-12 DIAGNOSIS — J01.80 ACUTE NON-RECURRENT SINUSITIS OF OTHER SINUS: ICD-10-CM

## 2023-05-12 DIAGNOSIS — I25.10 CORONARY ARTERY DISEASE INVOLVING NATIVE CORONARY ARTERY OF NATIVE HEART WITHOUT ANGINA PECTORIS: Chronic | ICD-10-CM

## 2023-05-12 DIAGNOSIS — E10.65 UNCONTROLLED TYPE 1 DIABETES MELLITUS WITH HYPERGLYCEMIA (HCC): Chronic | ICD-10-CM

## 2023-05-12 RX ORDER — DEXTROMETHORPHAN HYDROBROMIDE AND PROMETHAZINE HYDROCHLORIDE 15; 6.25 MG/5ML; MG/5ML
5 SYRUP ORAL 4 TIMES DAILY PRN
Qty: 120 ML | Refills: 0 | Status: SHIPPED | OUTPATIENT
Start: 2023-05-12 | End: 2023-05-19

## 2023-05-12 RX ORDER — METHYLPREDNISOLONE ACETATE 40 MG/ML
20 INJECTION, SUSPENSION INTRA-ARTICULAR; INTRALESIONAL; INTRAMUSCULAR; SOFT TISSUE ONCE
Status: COMPLETED | OUTPATIENT
Start: 2023-05-12 | End: 2023-05-12

## 2023-05-12 RX ORDER — CEFDINIR 300 MG/1
300 CAPSULE ORAL 2 TIMES DAILY
Qty: 20 CAPSULE | Refills: 0 | Status: SHIPPED | OUTPATIENT
Start: 2023-05-12 | End: 2023-05-22

## 2023-05-12 RX ADMIN — METHYLPREDNISOLONE ACETATE 20 MG: 40 INJECTION, SUSPENSION INTRA-ARTICULAR; INTRALESIONAL; INTRAMUSCULAR; SOFT TISSUE at 09:10

## 2023-05-12 ASSESSMENT — PATIENT HEALTH QUESTIONNAIRE - PHQ9
1. LITTLE INTEREST OR PLEASURE IN DOING THINGS: 0
2. FEELING DOWN, DEPRESSED OR HOPELESS: 0
SUM OF ALL RESPONSES TO PHQ QUESTIONS 1-9: 0
SUM OF ALL RESPONSES TO PHQ9 QUESTIONS 1 & 2: 0
SUM OF ALL RESPONSES TO PHQ QUESTIONS 1-9: 0

## 2023-05-12 ASSESSMENT — ENCOUNTER SYMPTOMS
COUGH: 1
SINUS PRESSURE: 1
ALLERGIC/IMMUNOLOGIC NEGATIVE: 1
GASTROINTESTINAL NEGATIVE: 1
EYES NEGATIVE: 1

## 2023-05-12 NOTE — PROGRESS NOTES
23  Name: Jake Diaz    : 1932    Sex: male    Age: 80 y.o. Subjective:  Chief Complaint: Patient is here for   4 mno  ck  re       chayo b bp  chol   arth  cad cough       Feel ok   no  cp or sob  was in  uc     week ago  sl cough  persists   Gets cr chest   sonf ro medina  Lab ok  plt  down   he help meds for a week      Review of Systems   Constitutional: Negative. HENT:  Positive for sinus pressure. Eyes: Negative. Respiratory:  Positive for cough. Cardiovascular: Negative. Gastrointestinal: Negative. Endocrine: Negative. Genitourinary: Negative. Musculoskeletal: Negative. Skin: Negative. Allergic/Immunologic: Negative. Neurological: Negative. Hematological: Negative. Psychiatric/Behavioral: Negative.          Current Outpatient Medications:     cefdinir (OMNICEF) 300 MG capsule, Take 1 capsule by mouth 2 times daily for 10 days, Disp: 20 capsule, Rfl: 0    promethazine-dextromethorphan (PROMETHAZINE-DM) 6.25-15 MG/5ML syrup, Take 5 mLs by mouth 4 times daily as needed for Cough, Disp: 120 mL, Rfl: 0    TRUEplus Lancets 33G MISC, 1 Units by Does not apply route in the morning, at noon, and at bedtime, Disp: 100 each, Rfl: 12    insulin detemir (LEVEMIR FLEXTOUCH) 100 UNIT/ML injection pen, Inject 25 units at night  - 90 day supply, Disp: 23 mL, Rfl: 3    insulin lispro, 1 Unit Dial, (HUMALOG KWIKPEN) 100 UNIT/ML SOPN, Inject 12 units  + med SS  2: 50> 150 at meals max 66 units per day, Disp: 60 mL, Rfl: 3    isosorbide mononitrate (IMDUR) 30 MG extended release tablet, take 1 tablet by mouth once daily, Disp: 30 tablet, Rfl: 5    doxycycline hyclate (VIBRA-TABS) 100 MG tablet, Take 1 tablet by mouth 2 times daily for 10 days, Disp: 20 tablet, Rfl: 0    losartan (COZAAR) 100 MG tablet, Take 1 tablet by mouth daily, Disp: 90 tablet, Rfl: 3    levocetirizine (XYZAL) 5 MG tablet, Take by mouth, Disp: , Rfl:     fluticasone (FLONASE) 50 MCG/ACT nasal spray, 2

## 2023-05-22 ENCOUNTER — HOSPITAL ENCOUNTER (OUTPATIENT)
Dept: CT IMAGING | Age: 88
Discharge: HOME | End: 2023-05-24
Payer: MEDICARE

## 2023-05-22 DIAGNOSIS — R91.1 LUNG NODULE: ICD-10-CM

## 2023-05-22 PROCEDURE — 71250 CT THORAX DX C-: CPT

## 2023-05-26 DIAGNOSIS — D69.6 THROMBOCYTOPENIA (HCC): ICD-10-CM

## 2023-05-26 LAB
BASOPHILS # BLD: 0.06 E9/L (ref 0–0.2)
BASOPHILS NFR BLD: 0.8 % (ref 0–2)
EOSINOPHIL # BLD: 1.05 E9/L (ref 0.05–0.5)
EOSINOPHIL NFR BLD: 13.2 % (ref 0–6)
ERYTHROCYTE [DISTWIDTH] IN BLOOD BY AUTOMATED COUNT: 14.8 FL (ref 11.5–15)
HCT VFR BLD AUTO: 42.6 % (ref 37–54)
HGB BLD-MCNC: 13.5 G/DL (ref 12.5–16.5)
IMM GRANULOCYTES # BLD: 0.02 E9/L
IMM GRANULOCYTES NFR BLD: 0.3 % (ref 0–5)
LYMPHOCYTES # BLD: 1.47 E9/L (ref 1.5–4)
LYMPHOCYTES NFR BLD: 18.5 % (ref 20–42)
MCH RBC QN AUTO: 28.7 PG (ref 26–35)
MCHC RBC AUTO-ENTMCNC: 31.7 % (ref 32–34.5)
MCV RBC AUTO: 90.6 FL (ref 80–99.9)
MONOCYTES # BLD: 1.13 E9/L (ref 0.1–0.95)
MONOCYTES NFR BLD: 14.2 % (ref 2–12)
NEUTROPHILS # BLD: 4.21 E9/L (ref 1.8–7.3)
NEUTS SEG NFR BLD: 53 % (ref 43–80)
PLATELET # BLD AUTO: 164 E9/L (ref 130–450)
PMV BLD AUTO: 13.8 FL (ref 7–12)
RBC # BLD AUTO: 4.7 E12/L (ref 3.8–5.8)
WBC # BLD: 7.9 E9/L (ref 4.5–11.5)

## 2023-06-01 NOTE — TELEPHONE ENCOUNTER
Refill request Bexarotene Pregnancy And Lactation Text: This medication is Pregnancy Category X and should not be given to women who are pregnant or may become pregnant. This medication should not be used if you are breast feeding.

## 2023-06-02 ENCOUNTER — OFFICE VISIT (OUTPATIENT)
Dept: PRIMARY CARE CLINIC | Age: 88
End: 2023-06-02
Payer: MEDICARE

## 2023-06-02 VITALS
SYSTOLIC BLOOD PRESSURE: 138 MMHG | BODY MASS INDEX: 26.99 KG/M2 | HEIGHT: 65 IN | WEIGHT: 162 LBS | DIASTOLIC BLOOD PRESSURE: 82 MMHG | TEMPERATURE: 97.6 F

## 2023-06-02 DIAGNOSIS — Z12.5 PROSTATE CANCER SCREENING: ICD-10-CM

## 2023-06-02 DIAGNOSIS — I25.10 CORONARY ARTERY DISEASE INVOLVING NATIVE CORONARY ARTERY OF NATIVE HEART WITHOUT ANGINA PECTORIS: Primary | Chronic | ICD-10-CM

## 2023-06-02 DIAGNOSIS — I10 ESSENTIAL HYPERTENSION: Chronic | ICD-10-CM

## 2023-06-02 DIAGNOSIS — E11.9 DIABETES MELLITUS WITHOUT COMPLICATION (HCC): ICD-10-CM

## 2023-06-02 DIAGNOSIS — M81.0 AGE-RELATED OSTEOPOROSIS WITHOUT CURRENT PATHOLOGICAL FRACTURE: ICD-10-CM

## 2023-06-02 DIAGNOSIS — D69.6 THROMBOCYTOPENIA (HCC): ICD-10-CM

## 2023-06-02 DIAGNOSIS — E78.2 MIXED HYPERLIPIDEMIA: Chronic | ICD-10-CM

## 2023-06-02 PROCEDURE — 1123F ACP DISCUSS/DSCN MKR DOCD: CPT | Performed by: FAMILY MEDICINE

## 2023-06-02 PROCEDURE — 99214 OFFICE O/P EST MOD 30 MIN: CPT | Performed by: FAMILY MEDICINE

## 2023-06-02 PROCEDURE — 3051F HG A1C>EQUAL 7.0%<8.0%: CPT | Performed by: FAMILY MEDICINE

## 2023-06-02 ASSESSMENT — ENCOUNTER SYMPTOMS
GASTROINTESTINAL NEGATIVE: 1
RESPIRATORY NEGATIVE: 1
ALLERGIC/IMMUNOLOGIC NEGATIVE: 1
EYES NEGATIVE: 1

## 2023-06-02 NOTE — PROGRESS NOTES
23  Name: Nanci Bejarano    : 1932    Sex: male    Age: 80 y.o. Subjective:  Chief Complaint: Patient is here for   3  week   ck    re palte  couhg      bp  chail  chol   arht c ad  cough     Feel ok no cp ro sob      cough gone  alb with plt  nomal          Review of Systems   Constitutional: Negative. HENT: Negative. Eyes: Negative. Respiratory: Negative. Cardiovascular: Negative. Gastrointestinal: Negative. Endocrine: Negative. Genitourinary: Negative. Musculoskeletal: Negative. Skin: Negative. Allergic/Immunologic: Negative. Neurological: Negative. Hematological: Negative. Psychiatric/Behavioral: Negative.          Current Outpatient Medications:     TRUEplus Lancets 33G MISC, 1 Units by Does not apply route in the morning, at noon, and at bedtime, Disp: 100 each, Rfl: 12    insulin detemir (LEVEMIR FLEXTOUCH) 100 UNIT/ML injection pen, Inject 25 units at night  - 90 day supply, Disp: 23 mL, Rfl: 3    insulin lispro, 1 Unit Dial, (HUMALOG KWIKPEN) 100 UNIT/ML SOPN, Inject 12 units  + med SS  2: 50> 150 at meals max 66 units per day, Disp: 60 mL, Rfl: 3    isosorbide mononitrate (IMDUR) 30 MG extended release tablet, take 1 tablet by mouth once daily, Disp: 30 tablet, Rfl: 5    losartan (COZAAR) 100 MG tablet, Take 1 tablet by mouth daily, Disp: 90 tablet, Rfl: 3    levocetirizine (XYZAL) 5 MG tablet, Take by mouth, Disp: , Rfl:     fluticasone (FLONASE) 50 MCG/ACT nasal spray, 2 sprays by Nasal route daily, Disp: , Rfl:     brimonidine (ALPHAGAN) 0.2 % ophthalmic solution, instill 1 drop into left eye every 12 hours, Disp: , Rfl:     aspirin EC 81 MG EC tablet, Take 1 tablet by mouth daily, Disp: 30 tablet, Rfl: 5    pravastatin (PRAVACHOL) 40 MG tablet, Take 1 tablet by mouth daily, Disp: 90 tablet, Rfl: 5    traZODone (DESYREL) 100 MG tablet, Take 1 tablet by mouth nightly as needed for Sleep, Disp: 90 tablet, Rfl: 5    cyanocobalamin 50 MCG tablet, Take

## 2023-06-27 ENCOUNTER — TELEPHONE (OUTPATIENT)
Dept: CARDIOLOGY CLINIC | Age: 88
End: 2023-06-27

## 2023-06-27 DIAGNOSIS — R07.89 OTHER CHEST PAIN: Primary | ICD-10-CM

## 2023-06-30 DIAGNOSIS — Z01.818 PRE-OP EXAM: ICD-10-CM

## 2023-06-30 DIAGNOSIS — R94.31 ABNORMAL EKG: ICD-10-CM

## 2023-06-30 DIAGNOSIS — R07.89 OTHER CHEST PAIN: Primary | ICD-10-CM

## 2023-06-30 RX ORDER — REGADENOSON 0.08 MG/ML
0.4 INJECTION, SOLUTION INTRAVENOUS
OUTPATIENT
Start: 2023-06-30

## 2023-07-03 ENCOUNTER — TELEPHONE (OUTPATIENT)
Dept: CARDIOLOGY | Age: 88
End: 2023-07-03

## 2023-07-03 RX ORDER — ACETAMINOPHEN/DIPHENHYDRAMINE 500MG-25MG
TABLET ORAL
Qty: 30 TABLET | Refills: 5 | Status: SHIPPED | OUTPATIENT
Start: 2023-07-03

## 2023-07-03 NOTE — TELEPHONE ENCOUNTER
Spoke with patient and confirmed Lexiscan stress test appointment on July 6, 2023 at 0700. Instructions for test,medication list, hold coreg for 24 hours, and COVID-19 preprocedure information reviewed with patient, questions answered. Patient verbalized understanding.

## 2023-07-06 ENCOUNTER — HOSPITAL ENCOUNTER (OUTPATIENT)
Dept: CARDIOLOGY | Age: 88
Discharge: HOME | End: 2023-07-06
Attending: INTERNAL MEDICINE
Payer: MEDICARE

## 2023-07-06 VITALS
HEIGHT: 65 IN | BODY MASS INDEX: 26.66 KG/M2 | SYSTOLIC BLOOD PRESSURE: 144 MMHG | DIASTOLIC BLOOD PRESSURE: 70 MMHG | WEIGHT: 160 LBS | RESPIRATION RATE: 16 BRPM | HEART RATE: 60 BPM

## 2023-07-06 DIAGNOSIS — Z01.818 PRE-OP EXAM: ICD-10-CM

## 2023-07-06 DIAGNOSIS — R94.31 ABNORMAL EKG: ICD-10-CM

## 2023-07-06 DIAGNOSIS — R07.89 OTHER CHEST PAIN: ICD-10-CM

## 2023-07-06 DIAGNOSIS — R07.89 OTHER CHEST PAIN: Primary | ICD-10-CM

## 2023-07-06 LAB
LV EF: 63 %
LVEF MODALITY: NORMAL

## 2023-07-06 PROCEDURE — 93017 CV STRESS TEST TRACING ONLY: CPT

## 2023-07-06 PROCEDURE — 78452 HT MUSCLE IMAGE SPECT MULT: CPT

## 2023-07-06 PROCEDURE — 3430000000 HC RX DIAGNOSTIC RADIOPHARMACEUTICAL: Performed by: INTERNAL MEDICINE

## 2023-07-06 PROCEDURE — 6360000002 HC RX W HCPCS: Performed by: INTERNAL MEDICINE

## 2023-07-06 PROCEDURE — A9500 TC99M SESTAMIBI: HCPCS | Performed by: INTERNAL MEDICINE

## 2023-07-06 PROCEDURE — 2580000003 HC RX 258: Performed by: INTERNAL MEDICINE

## 2023-07-06 RX ORDER — TETRAKIS(2-METHOXYISOBUTYLISOCYANIDE)COPPER(I) TETRAFLUOROBORATE 1 MG/ML
10.3 INJECTION, POWDER, LYOPHILIZED, FOR SOLUTION INTRAVENOUS
Status: COMPLETED | OUTPATIENT
Start: 2023-07-06 | End: 2023-07-06

## 2023-07-06 RX ORDER — SODIUM CHLORIDE 0.9 % (FLUSH) 0.9 %
10 SYRINGE (ML) INJECTION PRN
Status: DISCONTINUED | OUTPATIENT
Start: 2023-07-06 | End: 2023-07-07 | Stop reason: HOSPADM

## 2023-07-06 RX ORDER — TETRAKIS(2-METHOXYISOBUTYLISOCYANIDE)COPPER(I) TETRAFLUOROBORATE 1 MG/ML
33.5 INJECTION, POWDER, LYOPHILIZED, FOR SOLUTION INTRAVENOUS
Status: COMPLETED | OUTPATIENT
Start: 2023-07-06 | End: 2023-07-06

## 2023-07-06 RX ORDER — REGADENOSON 0.08 MG/ML
0.4 INJECTION, SOLUTION INTRAVENOUS
Status: COMPLETED | OUTPATIENT
Start: 2023-07-06 | End: 2023-07-06

## 2023-07-06 RX ADMIN — SODIUM CHLORIDE, PRESERVATIVE FREE 10 ML: 5 INJECTION INTRAVENOUS at 07:05

## 2023-07-06 RX ADMIN — REGADENOSON 0.4 MG: 0.08 INJECTION, SOLUTION INTRAVENOUS at 08:14

## 2023-07-06 RX ADMIN — Medication 33.5 MILLICURIE: at 08:14

## 2023-07-06 RX ADMIN — SODIUM CHLORIDE, PRESERVATIVE FREE 10 ML: 5 INJECTION INTRAVENOUS at 08:15

## 2023-07-06 RX ADMIN — SODIUM CHLORIDE, PRESERVATIVE FREE 10 ML: 5 INJECTION INTRAVENOUS at 08:14

## 2023-07-06 RX ADMIN — Medication 10.3 MILLICURIE: at 07:05

## 2023-07-06 NOTE — DISCHARGE INSTRUCTIONS
[facility]      Instructions to Patients    The following are the instructions for patients who have had a procedure in our office today. Patient name: Antelmo Wong    Radionuclide Activity: 40mCi of 99mTc-Sestamibi    Date Administered: 7/6/2023    Expires: 48 hours after scheduled appointment time      Patient may resume normal activity unless otherwise instructed. Patient may resume medications as normal.  If the need should arise, patient may call [phone] between the hours of 8:00am-5:00pm.  After hours there is at least one physician on-call at all times for those patients needing assistance. Patients may call [phone] and the answering service will direct the patient to one of our physicians for assistance. After the patient's test if they are going to be leaving from an airport in the near future they should take this letter with them to verify the test and radionuclide used for their test.      This letter verifies that the above named bearer received an injection of a radionuclide for medical purpose/usage only.         Electronically signed by Yo Granados on 7/6/2023 at 8:15 AM

## 2023-07-06 NOTE — PROCEDURES
[address] [facility] [address]   [facility] [address]  [phone]                Pharmacologic Stress Nuclear Gated SPECT Study    Name: [facility] Account Number: [de-identified]    :  1932          Sex: male         Date of Study:  2023    Height: 5' 5\" (165.1 cm)         Weight - Scale: 160 lb (72.6 kg)     Ordering Provider: Luiza Coe MD          PCP: Meenu Benavides DO      Cardiologist: Luiza Coe MD             Interpreting Physician: Lisette Wilson MD  _________________________________________________________________________________    Indication:   Preoperative Risk Stratification    Clinical History:   Patient has prior history of coronary artery disease. Resting ECG:    Sinus bradycardia, LBBB. Procedure:   Pharmacologic stress testing was performed with regadenoson 0.4 mg for 15 seconds. The heart rate was 58 at baseline and edmond to 86 beats during the infusion. This corresponds to 66% of maximum predicted heart rate. The blood pressure at baseline was 144/70 and blood pressure at the end of infusion was 148/70. Blood pressure response was normal during the stress procedure. The patient tolerated the infusion well. ECG during the infusion did not change. IMAGING: Myocardial perfusion imaging was performed at rest 30-35 minutes following the intravenous injection of 10.3 mCi of (Tc-Sestamibi) followed by 10 ml of Normal Saline. As per infusion protocol, the patient was injected intravenously with 33.5 mCi of (Tc-Sestamibi) followed by 10 ml of Normal Saline. Gated post-stress tomographic imaging was performed 20-25 minutes after stress. FINDINGS: The overall quality of the study was fair. Left ventricular cavity size was noted to be normal.    Rotational analog analysis demonstrated soft tissue diaphragmatic attenuation.     The gated SPECT stress imaging in the short, vertical long,

## 2023-07-14 DIAGNOSIS — E10.65 UNCONTROLLED TYPE 1 DIABETES MELLITUS WITH HYPERGLYCEMIA (HCC): ICD-10-CM

## 2023-07-14 RX ORDER — GLUCOSAMINE HCL/CHONDROITIN SU 500-400 MG
CAPSULE ORAL
Qty: 100 STRIP | Refills: 12 | Status: SHIPPED | OUTPATIENT
Start: 2023-07-14

## 2023-07-20 ENCOUNTER — TELEPHONE (OUTPATIENT)
Dept: CARDIOLOGY CLINIC | Age: 88
End: 2023-07-20

## 2023-07-20 NOTE — TELEPHONE ENCOUNTER
Contacted patient with results and recommendations per Dr. Anna Rasmussen. He verbalized understanding. Note forwarded to Dr. Larry Preciado.        ----- Message from Keara Mcdermott MD sent at 7/19/2023  8:47 PM EDT -----  Stress test looks good, normal blood flow to heart no evidence of blockages. Heart pumping function also appears normal.    May proceed with bronchoscopy from my standpoint without further cardiac testing. RCRI class II risk, 0.9% risk of major cardiac event. Follow up as scheduled.

## 2023-08-01 ENCOUNTER — OFFICE VISIT (OUTPATIENT)
Dept: CARDIOLOGY CLINIC | Age: 88
End: 2023-08-01
Payer: MEDICARE

## 2023-08-01 VITALS
RESPIRATION RATE: 12 BRPM | BODY MASS INDEX: 26.99 KG/M2 | HEART RATE: 56 BPM | HEIGHT: 65 IN | WEIGHT: 162 LBS | DIASTOLIC BLOOD PRESSURE: 62 MMHG | SYSTOLIC BLOOD PRESSURE: 138 MMHG

## 2023-08-01 DIAGNOSIS — Z01.810 PREOPERATIVE CARDIOVASCULAR EXAMINATION: ICD-10-CM

## 2023-08-01 DIAGNOSIS — I44.7 LBBB (LEFT BUNDLE BRANCH BLOCK): ICD-10-CM

## 2023-08-01 DIAGNOSIS — R00.1 SINUS BRADYCARDIA: ICD-10-CM

## 2023-08-01 DIAGNOSIS — I25.10 CORONARY ARTERY DISEASE INVOLVING NATIVE CORONARY ARTERY OF NATIVE HEART WITHOUT ANGINA PECTORIS: ICD-10-CM

## 2023-08-01 DIAGNOSIS — I10 ESSENTIAL HYPERTENSION: Primary | Chronic | ICD-10-CM

## 2023-08-01 PROCEDURE — 1123F ACP DISCUSS/DSCN MKR DOCD: CPT | Performed by: INTERNAL MEDICINE

## 2023-08-01 PROCEDURE — 93000 ELECTROCARDIOGRAM COMPLETE: CPT | Performed by: INTERNAL MEDICINE

## 2023-08-01 PROCEDURE — 99214 OFFICE O/P EST MOD 30 MIN: CPT | Performed by: INTERNAL MEDICINE

## 2023-08-01 NOTE — PROGRESS NOTES
all extremities x 4, trace lower extremity edema  Neurologic: No focal motor deficits apparent, normal mood and affect, alert and oriented x 3  Peripheral Pulses: Intact posterior tibial pulses bilaterally    Laboratory Tests:  Lab Results   Component Value Date    CREATININE 1.0 2023    BUN 13 2023     2023    K 5.1 (H) 2023    CL 98 2023    CO2 28 2023     No results found for: MG  Lab Results   Component Value Date    WBC 7.9 2023    HGB 13.5 2023    HCT 42.6 2023    MCV 90.6 2023     2023     Lab Results   Component Value Date    ALT 20 2023    AST 29 2023    ALKPHOS 68 2023    BILITOT 0.3 2023     Lab Results   Component Value Date    TROPONINI <0.01 2020    TROPONINI <0.01 10/04/2019     No results found for: INR, PROTIME  Lab Results   Component Value Date    TSH 2.360 2023     Lab Results   Component Value Date    LABA1C 7.4 (H) 2023     No results found for: EAG  Lab Results   Component Value Date    CHOL 153 2023    CHOL 205 (H) 2023    CHOL 191 10/11/2022     Lab Results   Component Value Date    TRIG 106 2023    TRIG 136 2023    TRIG 215 (H) 10/11/2022     Lab Results   Component Value Date    HDL 40 2023    HDL 47 2023    HDL 43 10/11/2022     Lab Results   Component Value Date    LDLCALC 92 2023    LDLCALC 131 (H) 2023    LDLCALC 105 (H) 10/11/2022     Lab Results   Component Value Date    LABVLDL 21 2023    LABVLDL 27 2023    LABVLDL 43 10/11/2022    VLDL 221 2019    VLDL 221 2018     Lab Results   Component Value Date    CHOLHDLRATIO 5.8 2019    CHOLHDLRATIO 5.8 2018     No results for input(s): PROBNP in the last 72 hours. Cardiac Tests:  EC2023: Sinus bradycardia 56 bpm.  Left bundle branch block QRS duration 130 ms. Rightward axis.     Echocardiogram: TTE 10/21/2019   Summary   Left

## 2023-09-06 DIAGNOSIS — K59.09 OTHER CONSTIPATION: ICD-10-CM

## 2023-09-11 ENCOUNTER — OFFICE VISIT (OUTPATIENT)
Dept: ENDOCRINOLOGY | Age: 88
End: 2023-09-11
Payer: MEDICARE

## 2023-09-11 VITALS
DIASTOLIC BLOOD PRESSURE: 68 MMHG | RESPIRATION RATE: 18 BRPM | SYSTOLIC BLOOD PRESSURE: 169 MMHG | HEIGHT: 65 IN | WEIGHT: 159 LBS | BODY MASS INDEX: 26.49 KG/M2 | HEART RATE: 61 BPM | OXYGEN SATURATION: 98 %

## 2023-09-11 DIAGNOSIS — E78.5 HYPERLIPIDEMIA, UNSPECIFIED HYPERLIPIDEMIA TYPE: ICD-10-CM

## 2023-09-11 DIAGNOSIS — Z79.4 TYPE 2 DIABETES MELLITUS WITH HYPERGLYCEMIA, WITH LONG-TERM CURRENT USE OF INSULIN (HCC): Primary | ICD-10-CM

## 2023-09-11 DIAGNOSIS — E11.65 TYPE 2 DIABETES MELLITUS WITH HYPERGLYCEMIA, WITH LONG-TERM CURRENT USE OF INSULIN (HCC): Primary | ICD-10-CM

## 2023-09-11 DIAGNOSIS — Z91.119 DIETARY NONCOMPLIANCE: ICD-10-CM

## 2023-09-11 LAB — HBA1C MFR BLD: 7.3 %

## 2023-09-11 PROCEDURE — 99214 OFFICE O/P EST MOD 30 MIN: CPT | Performed by: NURSE PRACTITIONER

## 2023-09-11 PROCEDURE — 1123F ACP DISCUSS/DSCN MKR DOCD: CPT | Performed by: NURSE PRACTITIONER

## 2023-09-11 PROCEDURE — 3051F HG A1C>EQUAL 7.0%<8.0%: CPT | Performed by: NURSE PRACTITIONER

## 2023-09-11 PROCEDURE — 83036 HEMOGLOBIN GLYCOSYLATED A1C: CPT | Performed by: NURSE PRACTITIONER

## 2023-09-11 RX ORDER — INSULIN LISPRO 100 [IU]/ML
INJECTION, SOLUTION INTRAVENOUS; SUBCUTANEOUS
Qty: 60 ML | Refills: 3
Start: 2023-09-11

## 2023-09-11 NOTE — PROGRESS NOTES
care team providers, and personally interpreted labs associated with the above diagnosis. I also ordered labs to further assess and manage the above addressed medical conditions. No follow-ups on file. The above issues were reviewed with the patient who understood and agreed with the plan. Thank you for allowing us to participate in the care of this patient. Please do not hesitate to contact us with any additional questions. Diagnosis Orders   1. Type 2 diabetes mellitus with hyperglycemia, with long-term current use of insulin (HCC)  POCT glycosylated hemoglobin (Hb A1C)    insulin detemir (LEVEMIR FLEXTOUCH) 100 UNIT/ML injection pen    insulin lispro, 1 Unit Dial, (HUMALOG KWIKPEN) 100 UNIT/ML SOPN      2. Dietary noncompliance        3. Hyperlipidemia, unspecified hyperlipidemia type            ELHAM Becerra NP  [facility] - BE[facility]AVIOR[facility] Diabetes Care and Endocrinology   [facility] [facility] [address]   Phone: [phone]  Fax: [phone]  --------------------------------------------  An electronic signature was used to authenticate this note.  ELHAM Becerra NP on 9/11/2023 at 10:17 AM

## 2023-09-19 DIAGNOSIS — I10 ESSENTIAL HYPERTENSION: ICD-10-CM

## 2023-09-20 RX ORDER — CARVEDILOL 12.5 MG/1
12.5 TABLET ORAL 2 TIMES DAILY
Qty: 180 TABLET | Refills: 12 | Status: SHIPPED | OUTPATIENT
Start: 2023-09-20

## 2023-09-22 ENCOUNTER — OFFICE VISIT (OUTPATIENT)
Dept: PRIMARY CARE CLINIC | Age: 88
End: 2023-09-22
Payer: MEDICARE

## 2023-09-22 VITALS
BODY MASS INDEX: 26.66 KG/M2 | HEIGHT: 65 IN | SYSTOLIC BLOOD PRESSURE: 138 MMHG | WEIGHT: 160 LBS | TEMPERATURE: 97.8 F | DIASTOLIC BLOOD PRESSURE: 83 MMHG

## 2023-09-22 DIAGNOSIS — M19.012 PRIMARY OSTEOARTHRITIS OF LEFT SHOULDER: ICD-10-CM

## 2023-09-22 DIAGNOSIS — Z23 IMMUNIZATION DUE: Primary | ICD-10-CM

## 2023-09-22 DIAGNOSIS — I10 ESSENTIAL HYPERTENSION: ICD-10-CM

## 2023-09-22 PROCEDURE — 99213 OFFICE O/P EST LOW 20 MIN: CPT | Performed by: FAMILY MEDICINE

## 2023-09-22 PROCEDURE — 1123F ACP DISCUSS/DSCN MKR DOCD: CPT | Performed by: FAMILY MEDICINE

## 2023-09-22 PROCEDURE — 90694 VACC AIIV4 NO PRSRV 0.5ML IM: CPT | Performed by: FAMILY MEDICINE

## 2023-09-22 PROCEDURE — G0008 ADMIN INFLUENZA VIRUS VAC: HCPCS | Performed by: FAMILY MEDICINE

## 2023-09-22 ASSESSMENT — ENCOUNTER SYMPTOMS
ALLERGIC/IMMUNOLOGIC NEGATIVE: 1
EYES NEGATIVE: 1
RESPIRATORY NEGATIVE: 1
GASTROINTESTINAL NEGATIVE: 1

## 2023-09-22 ASSESSMENT — PATIENT HEALTH QUESTIONNAIRE - PHQ9
SUM OF ALL RESPONSES TO PHQ QUESTIONS 1-9: 0
1. LITTLE INTEREST OR PLEASURE IN DOING THINGS: 0
SUM OF ALL RESPONSES TO PHQ QUESTIONS 1-9: 0

## 2023-09-22 NOTE — PROGRESS NOTES
23  Name: Eual Piña    : 1932    Sex: male    Age: 80 y.o. Subjective:  Chief Complaint: Patient is here for  left shoulder  pain     For years       no truama            Review of Systems   Constitutional: Negative. HENT: Negative. Eyes: Negative. Respiratory: Negative. Cardiovascular: Negative. Gastrointestinal: Negative. Endocrine: Negative. Genitourinary: Negative. Musculoskeletal:         Hpi   Skin: Negative. Allergic/Immunologic: Negative. Neurological: Negative. Hematological: Negative. Psychiatric/Behavioral: Negative. Current Outpatient Medications:     carvedilol (COREG) 12.5 MG tablet, Take 1 tablet by mouth 2 times daily, Disp: 180 tablet, Rfl: 12    insulin detemir (LEVEMIR FLEXTOUCH) 100 UNIT/ML injection pen, Inject 20 units at night  - 90 day supply, Disp: 23 mL, Rfl: 3    insulin lispro, 1 Unit Dial, (HUMALOG KWIKPEN) 100 UNIT/ML SOPN, Inject 12 units  + med SS  2: 50> 150 at meals max 66 units per day, Disp: 60 mL, Rfl: 3    linaclotide (LINZESS) 290 MCG CAPS capsule, Take 1 capsule by mouth every morning (before breakfast), Disp: 90 capsule, Rfl: 12    blood glucose monitor strips, Test 3 times a day & as needed for symptoms of irregular blood glucose.  Contour next test strips, Disp: 100 strip, Rfl: 12    RA ASPIRIN EC 81 MG EC tablet, take 1 tablet by mouth daily, Disp: 30 tablet, Rfl: 5    TRUEplus Lancets 33G MISC, 1 Units by Does not apply route in the morning, at noon, and at bedtime, Disp: 100 each, Rfl: 12    isosorbide mononitrate (IMDUR) 30 MG extended release tablet, take 1 tablet by mouth once daily, Disp: 30 tablet, Rfl: 5    losartan (COZAAR) 100 MG tablet, Take 1 tablet by mouth daily, Disp: 90 tablet, Rfl: 3    levocetirizine (XYZAL) 5 MG tablet, Take by mouth, Disp: , Rfl:     fluticasone (FLONASE) 50 MCG/ACT nasal spray, 2 sprays by Nasal route daily, Disp: , Rfl:     brimonidine (ALPHAGAN) 0.2 % ophthalmic

## 2023-10-06 NOTE — PATIENT INSTRUCTIONS
Zoë Sanz Patient : 1937   MRN: [id number]  Reason for Admission:   Discharge Date: 10/4/23 RARS: Readmission Risk Score: 13.9      Last Discharge Facility       Date Complaint Diagnosis Description Type Department Provider    10/3/23   Admission (Discharged) Amisha Modi MD            Was this an external facility discharge? No Discharge Facility:     Challenges to be reviewed by the provider   Additional needs identified to be addressed with provider: No  none               Method of communication with provider: none. Pt states doing well, no issues or concerns. Incision is CDI. Denies SOB, CP, fever. Agreed to more CTC f/u calls. Care Transition Nurse reviewed discharge instructions with patient who verbalized understanding. The patient was given an opportunity to ask questions and does not have any further questions or concerns at this time. Were discharge instructions available to patient? Yes. Reviewed appropriate site of care based on symptoms and resources available to patient including: When to call 911. The patient agrees to contact the PCP office for questions related to their healthcare. Advance Care Planning:   Does patient have an Advance Directive: not on file. Medication reconciliation was performed with patient, who verbalizes understanding of administration of home medications.  Medications reviewed, 1111F entered: yes    Was patient discharged with a pulse oximeter? no    Non-face-to-face services provided:  Obtained and reviewed discharge Recommendations for today's visit  · Change Levemir 30 units daily at night   · Take Novolog 12 units with meals + sliding scale   If blood sugars are 150-200 -add 2 units of Novolog    If blood sugars are 201-250 - add 4 units of Novolog   If blood sugars are 251-300 - add 6 units of Novolog   If blood sugars are 301-350 - add 8 units of Novolog   If blood sugars are above 350 - add 10 units of Novolog    · Start  Ozempic  0.25 mg once a week , stay on this dose for four weeks then increase to 0.5 mf per week after that. May experience, nausea, should get better over the time. If nausea is severe at 0.5 mg and was better at 0.25 mg dose , decrease to 0.25 mg/wk and stay on that. If vomit one or twice you should not be concerned , if you vomit several times , you should stop the drug and call our office. If you develop acute abdominal pain and vomiting, stop the medication completely and contact our office    · Check Blood sugar 4 times/day before meals and at bedtime and send us sugar log in a week      These are your blood sugar, blood pressure, cholesterol and A1c goals:  · Blood sugar fastin mg/dl to 130 mg/dl  · Blood sugar before meals: <150 mg/dl  · Peak blood sugar lower than 180 mg/dl  · A1c: between 6.5 - 7.5%    I you have any questions please call Dr. Symone Lemus office     Toni Martin MD  Endocrinologist, [facility])   [address], [place] [address]   Phone: [phone]  Fax: [phone]  Email: [email or web address]

## 2023-10-12 RX ORDER — ISOSORBIDE MONONITRATE 30 MG/1
TABLET, EXTENDED RELEASE ORAL
Qty: 30 TABLET | Refills: 5 | Status: SHIPPED | OUTPATIENT
Start: 2023-10-12

## 2023-10-30 ENCOUNTER — OFFICE VISIT (OUTPATIENT)
Dept: PRIMARY CARE CLINIC | Age: 88
End: 2023-10-30
Payer: MEDICARE

## 2023-10-30 VITALS
BODY MASS INDEX: 26.33 KG/M2 | WEIGHT: 158 LBS | OXYGEN SATURATION: 95 % | HEART RATE: 58 BPM | TEMPERATURE: 98.1 F | HEIGHT: 65 IN | SYSTOLIC BLOOD PRESSURE: 118 MMHG | DIASTOLIC BLOOD PRESSURE: 52 MMHG

## 2023-10-30 DIAGNOSIS — I25.10 CORONARY ARTERY DISEASE INVOLVING NATIVE CORONARY ARTERY OF NATIVE HEART WITHOUT ANGINA PECTORIS: Chronic | ICD-10-CM

## 2023-10-30 DIAGNOSIS — I95.0 IDIOPATHIC HYPOTENSION: ICD-10-CM

## 2023-10-30 DIAGNOSIS — I10 ESSENTIAL HYPERTENSION: Primary | ICD-10-CM

## 2023-10-30 DIAGNOSIS — M81.0 AGE-RELATED OSTEOPOROSIS WITHOUT CURRENT PATHOLOGICAL FRACTURE: ICD-10-CM

## 2023-10-30 DIAGNOSIS — E11.9 DIABETES MELLITUS WITHOUT COMPLICATION (HCC): ICD-10-CM

## 2023-10-30 DIAGNOSIS — I10 ESSENTIAL HYPERTENSION: Chronic | ICD-10-CM

## 2023-10-30 DIAGNOSIS — Z12.5 PROSTATE CANCER SCREENING: ICD-10-CM

## 2023-10-30 DIAGNOSIS — J31.0 CHRONIC RHINITIS: ICD-10-CM

## 2023-10-30 DIAGNOSIS — R42 DIZZINESS: ICD-10-CM

## 2023-10-30 LAB
ABSOLUTE IMMATURE GRANULOCYTE: <0.03 K/UL (ref 0–0.58)
ALBUMIN SERPL-MCNC: 4 G/DL (ref 3.5–5.2)
ALP BLD-CCNC: 75 U/L (ref 40–129)
ALT SERPL-CCNC: 14 U/L (ref 0–40)
ANION GAP SERPL CALCULATED.3IONS-SCNC: 15 MMOL/L (ref 7–16)
AST SERPL-CCNC: 22 U/L (ref 0–39)
BACTERIA: ABNORMAL
BASOPHILS ABSOLUTE: 0.06 K/UL (ref 0–0.2)
BASOPHILS RELATIVE PERCENT: 1 % (ref 0–2)
BILIRUB SERPL-MCNC: 0.5 MG/DL (ref 0–1.2)
BILIRUBIN URINE: NEGATIVE
BUN BLDV-MCNC: 20 MG/DL (ref 6–23)
CALCIUM SERPL-MCNC: 9.7 MG/DL (ref 8.6–10.2)
CHLORIDE BLD-SCNC: 102 MMOL/L (ref 98–107)
CHOLESTEROL: 170 MG/DL
CO2: 23 MMOL/L (ref 22–29)
COLOR: YELLOW
CREAT SERPL-MCNC: 0.9 MG/DL (ref 0.7–1.2)
EOSINOPHILS ABSOLUTE: 1.51 K/UL (ref 0.05–0.5)
EOSINOPHILS RELATIVE PERCENT: 13 % (ref 0–6)
GFR SERPL CREATININE-BSD FRML MDRD: >60 ML/MIN/1.73M2
GLUCOSE BLD-MCNC: 104 MG/DL (ref 74–99)
GLUCOSE URINE: NEGATIVE MG/DL
HBA1C MFR BLD: 7.8 % (ref 4–5.6)
HCT VFR BLD CALC: 42.3 % (ref 37–54)
HDLC SERPL-MCNC: 48 MG/DL
HEMOGLOBIN: 13.3 G/DL (ref 12.5–16.5)
IMMATURE GRANULOCYTES: 0 % (ref 0–5)
KETONES, URINE: NEGATIVE MG/DL
LDL CHOLESTEROL: 103 MG/DL
LEUKOCYTE ESTERASE, URINE: NEGATIVE
LYMPHOCYTES ABSOLUTE: 1.29 K/UL (ref 1.5–4)
LYMPHOCYTES RELATIVE PERCENT: 11 % (ref 20–42)
Lab: NORMAL
MCH RBC QN AUTO: 28 PG (ref 26–35)
MCHC RBC AUTO-ENTMCNC: 31.4 G/DL (ref 32–34.5)
MCV RBC AUTO: 89.1 FL (ref 80–99.9)
MICROALBUMIN/CREAT 24H UR: 346 MG/L (ref 0–19)
MONOCYTES ABSOLUTE: 1.28 K/UL (ref 0.1–0.95)
MONOCYTES RELATIVE PERCENT: 11 % (ref 2–12)
NEUTROPHILS ABSOLUTE: 7.18 K/UL (ref 1.8–7.3)
NEUTROPHILS RELATIVE PERCENT: 63 % (ref 43–80)
NITRITE, URINE: POSITIVE
PDW BLD-RTO: 14.9 % (ref 11.5–15)
PERFORMING INSTRUMENT: NORMAL
PH UA: 6 (ref 5–9)
PLATELET # BLD: 41 K/UL (ref 130–450)
PLATELET CONFIRMATION: NORMAL
PMV BLD AUTO: ABNORMAL FL (ref 7–12)
POTASSIUM SERPL-SCNC: 5 MMOL/L (ref 3.5–5)
PROSTATE SPECIFIC ANTIGEN: 1.01 NG/ML (ref 0–4)
PROTEIN UA: 100 MG/DL
QC PASS/FAIL: NORMAL
RBC # BLD: 4.75 M/UL (ref 3.8–5.8)
RBC UA: ABNORMAL /HPF
SARS-COV-2, POC: NORMAL
SODIUM BLD-SCNC: 140 MMOL/L (ref 132–146)
SPECIFIC GRAVITY UA: 1.02 (ref 1–1.03)
TOTAL PROTEIN: 8 G/DL (ref 6.4–8.3)
TRIGL SERPL-MCNC: 96 MG/DL
TURBIDITY: CLEAR
URINE HGB: ABNORMAL
UROBILINOGEN, URINE: 0.2 EU/DL (ref 0–1)
VITAMIN D 25-HYDROXY: 28.9 NG/ML (ref 30–100)
VLDLC SERPL CALC-MCNC: 19 MG/DL
WBC # BLD: 11 K/UL (ref 4.5–11.5)
WBC UA: ABNORMAL /HPF

## 2023-10-30 PROCEDURE — 1123F ACP DISCUSS/DSCN MKR DOCD: CPT | Performed by: FAMILY MEDICINE

## 2023-10-30 PROCEDURE — 87426 SARSCOV CORONAVIRUS AG IA: CPT | Performed by: FAMILY MEDICINE

## 2023-10-30 PROCEDURE — 99214 OFFICE O/P EST MOD 30 MIN: CPT | Performed by: FAMILY MEDICINE

## 2023-10-30 RX ORDER — MECLIZINE HCL 12.5 MG/1
12.5 TABLET ORAL 3 TIMES DAILY PRN
Qty: 30 TABLET | Refills: 0 | Status: SHIPPED | OUTPATIENT
Start: 2023-10-30 | End: 2023-11-09

## 2023-10-30 RX ORDER — LOSARTAN POTASSIUM 50 MG/1
50 TABLET ORAL DAILY
Qty: 90 TABLET | Refills: 5
Start: 2023-10-30

## 2023-10-30 SDOH — ECONOMIC STABILITY: INCOME INSECURITY: HOW HARD IS IT FOR YOU TO PAY FOR THE VERY BASICS LIKE FOOD, HOUSING, MEDICAL CARE, AND HEATING?: NOT HARD AT ALL

## 2023-10-30 SDOH — ECONOMIC STABILITY: FOOD INSECURITY: WITHIN THE PAST 12 MONTHS, YOU WORRIED THAT YOUR FOOD WOULD RUN OUT BEFORE YOU GOT MONEY TO BUY MORE.: NEVER TRUE

## 2023-10-30 SDOH — ECONOMIC STABILITY: FOOD INSECURITY: WITHIN THE PAST 12 MONTHS, THE FOOD YOU BOUGHT JUST DIDN'T LAST AND YOU DIDN'T HAVE MONEY TO GET MORE.: NEVER TRUE

## 2023-10-30 SDOH — ECONOMIC STABILITY: HOUSING INSECURITY
IN THE LAST 12 MONTHS, WAS THERE A TIME WHEN YOU DID NOT HAVE A STEADY PLACE TO SLEEP OR SLEPT IN A SHELTER (INCLUDING NOW)?: NO

## 2023-10-30 ASSESSMENT — PATIENT HEALTH QUESTIONNAIRE - PHQ9
SUM OF ALL RESPONSES TO PHQ9 QUESTIONS 1 & 2: 0
SUM OF ALL RESPONSES TO PHQ QUESTIONS 1-9: 0
1. LITTLE INTEREST OR PLEASURE IN DOING THINGS: 0
SUM OF ALL RESPONSES TO PHQ QUESTIONS 1-9: 0
SUM OF ALL RESPONSES TO PHQ QUESTIONS 1-9: 0
2. FEELING DOWN, DEPRESSED OR HOPELESS: 0
SUM OF ALL RESPONSES TO PHQ QUESTIONS 1-9: 0

## 2023-10-30 NOTE — PROGRESS NOTES
10/30/23  Name: Emily Bone    : 1932    Sex: male    Age: 80 y.o. Subjective:  Chief Complaint: Patient is here for low bp  exposure  covid    Here with leia  Ibt light heaed  and he ck bp  and  low  Light headed when    sat up   yest        Review of Systems   Constitutional: Negative. HENT: Negative. Eyes: Negative. Respiratory: Negative. Cardiovascular: Negative. Gastrointestinal: Negative. Endocrine: Negative. Genitourinary: Negative. Musculoskeletal: Negative. Skin: Negative. Allergic/Immunologic: Negative. Neurological: Negative. Hematological: Negative. Psychiatric/Behavioral: Negative. Current Outpatient Medications:     losartan (COZAAR) 50 MG tablet, Take 1 tablet by mouth daily, Disp: 90 tablet, Rfl: 5    meclizine (ANTIVERT) 12.5 MG tablet, Take 1 tablet by mouth 3 times daily as needed for Dizziness, Disp: 30 tablet, Rfl: 0    isosorbide mononitrate (IMDUR) 30 MG extended release tablet, take 1 tablet by mouth once daily, Disp: 30 tablet, Rfl: 5    carvedilol (COREG) 12.5 MG tablet, Take 1 tablet by mouth 2 times daily, Disp: 180 tablet, Rfl: 12    insulin detemir (LEVEMIR FLEXTOUCH) 100 UNIT/ML injection pen, Inject 20 units at night  - 90 day supply, Disp: 23 mL, Rfl: 3    insulin lispro, 1 Unit Dial, (HUMALOG KWIKPEN) 100 UNIT/ML SOPN, Inject 12 units  + med SS  2: 50> 150 at meals max 66 units per day, Disp: 60 mL, Rfl: 3    linaclotide (LINZESS) 290 MCG CAPS capsule, Take 1 capsule by mouth every morning (before breakfast), Disp: 90 capsule, Rfl: 12    blood glucose monitor strips, Test 3 times a day & as needed for symptoms of irregular blood glucose.  Contour next test strips, Disp: 100 strip, Rfl: 12    RA ASPIRIN EC 81 MG EC tablet, take 1 tablet by mouth daily, Disp: 30 tablet, Rfl: 5    TRUEplus Lancets 33G MISC, 1 Units by Does not apply route in the morning, at noon, and at bedtime, Disp: 100 each, Rfl: 12    levocetirizine

## 2023-10-31 ENCOUNTER — TELEPHONE (OUTPATIENT)
Dept: PRIMARY CARE CLINIC | Age: 88
End: 2023-10-31

## 2023-10-31 NOTE — TELEPHONE ENCOUNTER
Notify daughter Tamika Reed that all lab was okay. Vitamin D is low but low encourage D3 2000 daily.   Everything else is okay

## 2023-11-14 ENCOUNTER — TELEPHONE (OUTPATIENT)
Dept: PRIMARY CARE CLINIC | Age: 88
End: 2023-11-14

## 2023-11-14 ENCOUNTER — OFFICE VISIT (OUTPATIENT)
Dept: PRIMARY CARE CLINIC | Age: 88
End: 2023-11-14
Payer: MEDICARE

## 2023-11-14 VITALS
WEIGHT: 159 LBS | HEIGHT: 65 IN | TEMPERATURE: 97.6 F | DIASTOLIC BLOOD PRESSURE: 50 MMHG | SYSTOLIC BLOOD PRESSURE: 126 MMHG | BODY MASS INDEX: 26.49 KG/M2

## 2023-11-14 DIAGNOSIS — M81.0 AGE-RELATED OSTEOPOROSIS WITHOUT CURRENT PATHOLOGICAL FRACTURE: ICD-10-CM

## 2023-11-14 DIAGNOSIS — Z00.00 MEDICARE ANNUAL WELLNESS VISIT, SUBSEQUENT: Primary | ICD-10-CM

## 2023-11-14 DIAGNOSIS — E78.2 MIXED HYPERLIPIDEMIA: Chronic | ICD-10-CM

## 2023-11-14 DIAGNOSIS — E10.65 UNCONTROLLED TYPE 1 DIABETES MELLITUS WITH HYPERGLYCEMIA (HCC): Chronic | ICD-10-CM

## 2023-11-14 DIAGNOSIS — I25.10 CORONARY ARTERY DISEASE INVOLVING NATIVE CORONARY ARTERY OF NATIVE HEART WITHOUT ANGINA PECTORIS: Chronic | ICD-10-CM

## 2023-11-14 DIAGNOSIS — I10 ESSENTIAL HYPERTENSION: Chronic | ICD-10-CM

## 2023-11-14 PROCEDURE — 1123F ACP DISCUSS/DSCN MKR DOCD: CPT | Performed by: FAMILY MEDICINE

## 2023-11-14 PROCEDURE — G0439 PPPS, SUBSEQ VISIT: HCPCS | Performed by: FAMILY MEDICINE

## 2023-11-14 PROCEDURE — 3051F HG A1C>EQUAL 7.0%<8.0%: CPT | Performed by: FAMILY MEDICINE

## 2023-11-14 RX ORDER — LOSARTAN POTASSIUM 50 MG/1
50 TABLET ORAL DAILY
Qty: 90 TABLET | Refills: 5 | Status: SHIPPED | OUTPATIENT
Start: 2023-11-14

## 2023-11-14 RX ORDER — LOSARTAN POTASSIUM 50 MG/1
50 TABLET ORAL DAILY
Qty: 90 TABLET | Refills: 5 | Status: SHIPPED | OUTPATIENT
Start: 2023-11-14 | End: 2023-11-14

## 2023-11-14 ASSESSMENT — LIFESTYLE VARIABLES
HOW MANY STANDARD DRINKS CONTAINING ALCOHOL DO YOU HAVE ON A TYPICAL DAY: PATIENT DOES NOT DRINK
HOW OFTEN DO YOU HAVE A DRINK CONTAINING ALCOHOL: NEVER

## 2023-11-14 NOTE — PROGRESS NOTES
diet exer   reg appt    I educated the patient about all medications. Make sure they were correct and educated  on the risk associated with missing meds or taking them incorrectly. A list of medications is being sent home with patient today. I educated the patient about all medications. Make sure they were correct and educated  on the risk associated with missing meds or taking them incorrectly. A list of medications is being sent home with patient today. Check blood pressure at home twice a day. Low-salt low caffeine diet. Call if systolic blood pressure is above 671 and diastolic blood pressures above 85. Only use a upper arm digital cuff. increased    A great deal of time spent reviewing medications, diet, exercise, social issues. Also reviewing the chart before entering the room with patient and finishing charting after leaving patient's room. More than half of that time was spent face to face with the patient in counseling and coordinating care. I informed patient about the risk associated with noncompliance of medication and taking medications incorrectly. Appropriate follow-up with myself and all specialist.  Encourage family members to take active role in assisting with medications and medical care.   If any confusion should develop to notify my office immediately to avoid risk of worsening medical condition

## 2023-11-15 RX ORDER — MECLIZINE HYDROCHLORIDE 25 MG/1
25 TABLET ORAL 3 TIMES DAILY PRN
Qty: 60 TABLET | Refills: 3 | Status: SHIPPED
Start: 2023-11-15 | End: 2023-11-16 | Stop reason: SDUPTHER

## 2023-11-15 NOTE — TELEPHONE ENCOUNTER
----- Message from Magnolia Chi sent at 11/14/2023  4:13 PM EST -----  Subject: Refill Request    QUESTIONS  Name of Medication? meclizine (ANTIVERT) 12.5 MG tablet  Patient-reported dosage and instructions? once a day   How many days do you have left? 0  Preferred Pharmacy? [id number]  Pharmacy phone number (if available)? [phone]  Additional Information for Provider? pt would like 25mg tablets called in   because he has to take two  ---------------------------------------------------------------------------  --------------  CALL BACK INFO  What is the best way for the office to contact you? OK to leave message on   voicemail  Preferred Call Back Phone Number? [phone]  ---------------------------------------------------------------------------  --------------  SCRIPT ANSWERS  Relationship to Patient? Other/Third Party  Representative Name? daughter Judi Mcdonald  Is the representative on the Communication Release of Information (STANISLAW)   form in Epic?  Yes

## 2023-11-16 RX ORDER — MECLIZINE HYDROCHLORIDE 25 MG/1
25 TABLET ORAL 3 TIMES DAILY PRN
Qty: 60 TABLET | Refills: 3 | Status: SHIPPED | OUTPATIENT
Start: 2023-11-16

## 2023-11-27 DIAGNOSIS — E78.2 MIXED HYPERLIPIDEMIA: ICD-10-CM

## 2023-11-28 RX ORDER — PRAVASTATIN SODIUM 40 MG
40 TABLET ORAL DAILY
Qty: 90 TABLET | Refills: 5 | Status: SHIPPED | OUTPATIENT
Start: 2023-11-28

## 2023-12-04 RX ORDER — ACETAMINOPHEN/DIPHENHYDRAMINE 500MG-25MG
TABLET ORAL
Qty: 30 TABLET | Refills: 5 | Status: SHIPPED | OUTPATIENT
Start: 2023-12-04

## 2024-01-11 ENCOUNTER — OFFICE VISIT (OUTPATIENT)
Dept: ENDOCRINOLOGY | Age: 89
End: 2024-01-11
Payer: MEDICARE

## 2024-01-11 VITALS
WEIGHT: 159 LBS | OXYGEN SATURATION: 99 % | BODY MASS INDEX: 26.49 KG/M2 | SYSTOLIC BLOOD PRESSURE: 179 MMHG | DIASTOLIC BLOOD PRESSURE: 73 MMHG | HEIGHT: 65 IN | HEART RATE: 56 BPM | RESPIRATION RATE: 18 BRPM

## 2024-01-11 DIAGNOSIS — Z91.119 DIETARY NONCOMPLIANCE: ICD-10-CM

## 2024-01-11 DIAGNOSIS — E11.65 TYPE 2 DIABETES MELLITUS WITH HYPERGLYCEMIA, WITH LONG-TERM CURRENT USE OF INSULIN (HCC): Primary | ICD-10-CM

## 2024-01-11 DIAGNOSIS — Z79.4 TYPE 2 DIABETES MELLITUS WITH HYPERGLYCEMIA, WITH LONG-TERM CURRENT USE OF INSULIN (HCC): Primary | ICD-10-CM

## 2024-01-11 DIAGNOSIS — E55.9 VITAMIN D DEFICIENCY: ICD-10-CM

## 2024-01-11 DIAGNOSIS — E78.5 HYPERLIPIDEMIA, UNSPECIFIED HYPERLIPIDEMIA TYPE: ICD-10-CM

## 2024-01-11 PROCEDURE — 99214 OFFICE O/P EST MOD 30 MIN: CPT | Performed by: NURSE PRACTITIONER

## 2024-01-11 PROCEDURE — 1123F ACP DISCUSS/DSCN MKR DOCD: CPT | Performed by: NURSE PRACTITIONER
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
## 2024-01-16 DIAGNOSIS — Z79.4 TYPE 2 DIABETES MELLITUS WITH HYPERGLYCEMIA, WITH LONG-TERM CURRENT USE OF INSULIN (HCC): ICD-10-CM

## 2024-01-16 DIAGNOSIS — E11.65 TYPE 2 DIABETES MELLITUS WITH HYPERGLYCEMIA, WITH LONG-TERM CURRENT USE OF INSULIN (HCC): ICD-10-CM

## 2024-01-16 LAB — HBA1C MFR BLD: 8.6 % (ref 4–5.6)

## 2024-01-22 ENCOUNTER — OFFICE VISIT (OUTPATIENT)
Dept: PRIMARY CARE CLINIC | Age: 89
End: 2024-01-22

## 2024-01-22 VITALS
HEIGHT: 65 IN | SYSTOLIC BLOOD PRESSURE: 138 MMHG | TEMPERATURE: 97.4 F | OXYGEN SATURATION: 98 % | DIASTOLIC BLOOD PRESSURE: 83 MMHG | RESPIRATION RATE: 17 BRPM | BODY MASS INDEX: 26.69 KG/M2 | HEART RATE: 60 BPM | WEIGHT: 160.2 LBS

## 2024-01-22 DIAGNOSIS — R42 DIZZINESS: ICD-10-CM

## 2024-01-22 DIAGNOSIS — I10 ESSENTIAL HYPERTENSION: Primary | Chronic | ICD-10-CM

## 2024-01-22 DIAGNOSIS — I25.10 CORONARY ARTERY DISEASE INVOLVING NATIVE CORONARY ARTERY OF NATIVE HEART WITHOUT ANGINA PECTORIS: Chronic | ICD-10-CM

## 2024-01-22 DIAGNOSIS — E78.2 MIXED HYPERLIPIDEMIA: Chronic | ICD-10-CM

## 2024-01-22 DIAGNOSIS — E10.65 UNCONTROLLED TYPE 1 DIABETES MELLITUS WITH HYPERGLYCEMIA (HCC): Chronic | ICD-10-CM

## 2024-01-22 ASSESSMENT — LIFESTYLE VARIABLES: HOW MANY STANDARD DRINKS CONTAINING ALCOHOL DO YOU HAVE ON A TYPICAL DAY: PATIENT DOES NOT DRINK

## 2024-01-22 ASSESSMENT — PATIENT HEALTH QUESTIONNAIRE - PHQ9
SUM OF ALL RESPONSES TO PHQ QUESTIONS 1-9: 0
1. LITTLE INTEREST OR PLEASURE IN DOING THINGS: 0
SUM OF ALL RESPONSES TO PHQ9 QUESTIONS 1 & 2: 0
2. FEELING DOWN, DEPRESSED OR HOPELESS: 0
SUM OF ALL RESPONSES TO PHQ QUESTIONS 1-9: 0

## 2024-01-22 NOTE — PATIENT INSTRUCTIONS
Learning About Being Active as an Older Adult  Why is being active important as you get older?     Being active is one of the best things you can do for your health. And it's never too late to start. Being active--or getting active, if you aren't already--has definite benefits. It can:  Give you more energy,  Keep your mind sharp.  Improve balance to reduce your risk of falls.  Help you manage chronic illness with fewer medicines.  No matter how old you are, how fit you are, or what health problems you have, there is a form of activity that will work for you. And the more physical activity you can do, the better your overall health will be.  What kinds of activity can help you stay healthy?  Being more active will make your daily activities easier. Physical activity includes planned exercise and things you do in daily life. There are four types of activity:  Aerobic.  Doing aerobic activity makes your heart and lungs strong.  Includes walking, dancing, and gardening.  Aim for at least 2½ hours spread throughout the week.  It improves your energy and can help you sleep better.  Muscle-strengthening.  This type of activity can help maintain muscle and strengthen bones.  Includes climbing stairs, using resistance bands, and lifting or carrying heavy loads.  Aim for at least twice a week.  It can help protect the knees and other joints.  Stretching.  Stretching gives you better range of motion in joints and muscles.  Includes upper arm stretches, calf stretches, and gentle yoga.  Aim for at least twice a week, preferably after your muscles are warmed up from other activities.  It can help you function better in daily life.  Balancing.  This helps you stay coordinated and have good posture.  Includes heel-to-toe walking, abhishek chi, and certain types of yoga.  Aim for at least 3 days a week.  It can reduce your risk of falling.  Even if you have a hard time meeting the recommendations, it's better to be more active

## 2024-01-22 NOTE — PROGRESS NOTES
extended release tablet take 1 tablet by mouth once daily  Stevie Herrera MD   carvedilol (COREG) 12.5 MG tablet Take 1 tablet by mouth 2 times daily  Kris Davey DO   insulin detemir (LEVEMIR FLEXTOUCH) 100 UNIT/ML injection pen Inject 20 units at night  - 90 day supply  Deidra Aparicio APRN - NP   insulin lispro, 1 Unit Dial, (HUMALOG KWIKPEN) 100 UNIT/ML SOPN Inject 12 units  + med SS  2: 50> 150 at meals max 66 units per day  Deidra Aparicio APRN - NP   linaclotide (LINZESS) 290 MCG CAPS capsule Take 1 capsule by mouth every morning (before breakfast)  Kris Davey DO   blood glucose monitor strips Test 3 times a day & as needed for symptoms of irregular blood glucose. Contour next test strips  Kris Davey DO   TRUEplus Lancets 33G MISC 1 Units by Does not apply route in the morning, at noon, and at bedtime  Kris Davey DO   levocetirizine (XYZAL) 5 MG tablet Take by mouth  Mg Norwood MD   fluticasone (FLONASE) 50 MCG/ACT nasal spray 2 sprays by Nasal route daily  Mg Norwood MD   brimonidine (ALPHAGAN) 0.2 % ophthalmic solution instill 1 drop into left eye every 12 hours  Mg Norwood MD   traZODone (DESYREL) 100 MG tablet Take 1 tablet by mouth nightly as needed for Sleep  Kris Davey DO   cyanocobalamin 50 MCG tablet Take 1 tablet by mouth  Mg Norwood MD   prochlorperazine (COMPAZINE) 10 MG tablet Take 1 tablet by mouth every 6 hours as needed (nausea)  Escobar Block MD   Insulin Pen Needle (BD PEN NEEDLE MICRO U/F) 32G X 6 MM MISC Uses with insulin 4 times a day  Cedric Durham MD   blood glucose test strips (ASCENSIA AUTODISC VI;ONE TOUCH ULTRA TEST VI) strip 1 each by In Vitro route daily As needed.  Kris Davey DO   latanoprost (XALATAN) 0.005 % ophthalmic solution Place 1 drop into the left eye nightly  ProviderMg MD CareTeam (Including outside providers/suppliers regularly

## 2024-01-23 ENCOUNTER — TELEPHONE (OUTPATIENT)
Dept: ENDOCRINOLOGY | Age: 89
End: 2024-01-23

## 2024-03-14 DIAGNOSIS — F51.01 PRIMARY INSOMNIA: ICD-10-CM

## 2024-03-14 DIAGNOSIS — I10 ESSENTIAL HYPERTENSION: Chronic | ICD-10-CM

## 2024-03-14 RX ORDER — LOSARTAN POTASSIUM 50 MG/1
50 TABLET ORAL DAILY
Qty: 90 TABLET | Refills: 5 | Status: SHIPPED | OUTPATIENT
Start: 2024-03-14

## 2024-03-14 RX ORDER — TRAZODONE HYDROCHLORIDE 100 MG/1
100 TABLET ORAL NIGHTLY PRN
Qty: 90 TABLET | Refills: 5 | Status: SHIPPED | OUTPATIENT
Start: 2024-03-14

## 2024-03-14 NOTE — TELEPHONE ENCOUNTER
Patients last appointment 1/22/2024.  Patients next scheduled appointment   Future Appointments   Date Time Provider Department Center   4/11/2024  1:30 PM Jesus Alberto Ma MD [place] [facility]   4/22/2024  9:15 AM Kris Davey DO [facility]   5/21/2024 10:00 AM Deidra Aparicio, ELHAM - NP [facility]

## 2024-03-27 RX ORDER — ISOSORBIDE MONONITRATE 30 MG/1
TABLET, EXTENDED RELEASE ORAL
Qty: 90 TABLET | Refills: 3 | Status: SHIPPED | OUTPATIENT
Start: 2024-03-27

## 2024-04-11 ENCOUNTER — OFFICE VISIT (OUTPATIENT)
Dept: CARDIOLOGY CLINIC | Age: 89
End: 2024-04-11
Payer: MEDICARE

## 2024-04-11 VITALS
BODY MASS INDEX: 26.98 KG/M2 | RESPIRATION RATE: 16 BRPM | SYSTOLIC BLOOD PRESSURE: 152 MMHG | WEIGHT: 161.9 LBS | DIASTOLIC BLOOD PRESSURE: 70 MMHG | HEART RATE: 69 BPM | HEIGHT: 65 IN

## 2024-04-11 DIAGNOSIS — I10 ESSENTIAL HYPERTENSION: Primary | ICD-10-CM

## 2024-04-11 DIAGNOSIS — I44.7 LBBB (LEFT BUNDLE BRANCH BLOCK): ICD-10-CM

## 2024-04-11 DIAGNOSIS — R07.9 CHEST PAIN, UNSPECIFIED TYPE: ICD-10-CM

## 2024-04-11 PROCEDURE — 99214 OFFICE O/P EST MOD 30 MIN: CPT | Performed by: INTERNAL MEDICINE

## 2024-04-11 PROCEDURE — 93000 ELECTROCARDIOGRAM COMPLETE: CPT | Performed by: INTERNAL MEDICINE

## 2024-04-11 PROCEDURE — 1123F ACP DISCUSS/DSCN MKR DOCD: CPT | Performed by: INTERNAL MEDICINE
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
## 2024-04-19 DIAGNOSIS — E78.2 MIXED HYPERLIPIDEMIA: Chronic | ICD-10-CM

## 2024-04-19 DIAGNOSIS — I10 ESSENTIAL HYPERTENSION: Chronic | ICD-10-CM

## 2024-04-19 DIAGNOSIS — E10.65 UNCONTROLLED TYPE 1 DIABETES MELLITUS WITH HYPERGLYCEMIA (HCC): Chronic | ICD-10-CM

## 2024-04-19 DIAGNOSIS — M81.0 AGE-RELATED OSTEOPOROSIS WITHOUT CURRENT PATHOLOGICAL FRACTURE: ICD-10-CM

## 2024-04-19 LAB
ALBUMIN: 4.4 G/DL (ref 3.5–5.2)
ALP BLD-CCNC: 67 U/L (ref 40–129)
ALT SERPL-CCNC: 11 U/L (ref 0–40)
ANION GAP SERPL CALCULATED.3IONS-SCNC: 10 MMOL/L (ref 7–16)
AST SERPL-CCNC: 12 U/L (ref 0–39)
BACTERIA: ABNORMAL
BASOPHILS ABSOLUTE: 0 K/UL (ref 0–0.2)
BASOPHILS RELATIVE PERCENT: 0 % (ref 0–2)
BILIRUB SERPL-MCNC: 0.4 MG/DL (ref 0–1.2)
BILIRUBIN URINE: NEGATIVE
BUN BLDV-MCNC: 17 MG/DL (ref 6–23)
CALCIUM SERPL-MCNC: 9.8 MG/DL (ref 8.6–10.2)
CHLORIDE BLD-SCNC: 99 MMOL/L (ref 98–107)
CHOLESTEROL: 172 MG/DL
CO2: 27 MMOL/L (ref 22–29)
COLOR: YELLOW
CREAT SERPL-MCNC: 0.8 MG/DL (ref 0.7–1.2)
CREATININE URINE: 77.6 MG/DL (ref 40–278)
EOSINOPHILS ABSOLUTE: 0 K/UL (ref 0.05–0.5)
EOSINOPHILS RELATIVE PERCENT: 0 % (ref 0–6)
EPITHELIAL CELLS UA: ABNORMAL /HPF
GFR SERPL CREATININE-BSD FRML MDRD: 83 ML/MIN/1.73M2
GLUCOSE BLD-MCNC: 149 MG/DL (ref 74–99)
GLUCOSE URINE: 100 MG/DL
HBA1C MFR BLD: 8.3 % (ref 4–5.6)
HCT VFR BLD CALC: 43.5 % (ref 37–54)
HDLC SERPL-MCNC: 45 MG/DL
HEMOGLOBIN: 13.6 G/DL (ref 12.5–16.5)
KETONES, URINE: NEGATIVE MG/DL
LDL CHOLESTEROL: 107 MG/DL
LEUKOCYTE ESTERASE, URINE: ABNORMAL
LYMPHOCYTES ABSOLUTE: 1.93 K/UL (ref 1.5–4)
LYMPHOCYTES RELATIVE PERCENT: 17 % (ref 20–42)
MCH RBC QN AUTO: 27.8 PG (ref 26–35)
MCHC RBC AUTO-ENTMCNC: 31.3 G/DL (ref 32–34.5)
MCV RBC AUTO: 88.8 FL (ref 80–99.9)
MICROALBUMIN/CREAT 24H UR: 797 MG/L (ref 0–19)
MICROALBUMIN/CREAT UR-RTO: 1026 MCG/MG CREAT (ref 0–30)
MONOCYTES ABSOLUTE: 1.02 K/UL (ref 0.1–0.95)
MONOCYTES RELATIVE PERCENT: 9 % (ref 2–12)
NEUTROPHILS ABSOLUTE: 8.75 K/UL (ref 1.8–7.3)
NEUTROPHILS RELATIVE PERCENT: 75 % (ref 43–80)
NITRITE, URINE: POSITIVE
PDW BLD-RTO: 14.6 % (ref 11.5–15)
PH UA: 7 (ref 5–9)
PLATELET CONFIRMATION: NORMAL
PLATELET, FLUORESCENCE: 149 K/UL (ref 130–450)
PMV BLD AUTO: ABNORMAL FL (ref 7–12)
POTASSIUM SERPL-SCNC: 4.6 MMOL/L (ref 3.5–5)
PROTEIN UA: 100 MG/DL
RBC # BLD: 4.9 M/UL (ref 3.8–5.8)
RBC # BLD: ABNORMAL 10*6/UL
RBC UA: ABNORMAL /HPF
SODIUM BLD-SCNC: 136 MMOL/L (ref 132–146)
SPECIFIC GRAVITY UA: 1.02 (ref 1–1.03)
TOTAL PROTEIN: 8.1 G/DL (ref 6.4–8.3)
TRIGL SERPL-MCNC: 98 MG/DL
TURBIDITY: CLEAR
URINE HGB: NEGATIVE
UROBILINOGEN, URINE: 0.2 EU/DL (ref 0–1)
VITAMIN D 25-HYDROXY: 28 NG/ML (ref 30–100)
VLDLC SERPL CALC-MCNC: 20 MG/DL
WBC # BLD: 11.7 K/UL (ref 4.5–11.5)
WBC UA: ABNORMAL /HPF

## 2024-04-24 ENCOUNTER — OFFICE VISIT (OUTPATIENT)
Dept: PRIMARY CARE CLINIC | Age: 89
End: 2024-04-24
Payer: MEDICARE

## 2024-04-24 VITALS — HEIGHT: 64 IN | BODY MASS INDEX: 26.98 KG/M2 | WEIGHT: 158 LBS | TEMPERATURE: 97.8 F

## 2024-04-24 DIAGNOSIS — M81.0 AGE-RELATED OSTEOPOROSIS WITHOUT CURRENT PATHOLOGICAL FRACTURE: ICD-10-CM

## 2024-04-24 DIAGNOSIS — E78.2 MIXED HYPERLIPIDEMIA: Chronic | ICD-10-CM

## 2024-04-24 DIAGNOSIS — I25.10 CORONARY ARTERY DISEASE INVOLVING NATIVE CORONARY ARTERY OF NATIVE HEART WITHOUT ANGINA PECTORIS: Chronic | ICD-10-CM

## 2024-04-24 DIAGNOSIS — E10.65 UNCONTROLLED TYPE 1 DIABETES MELLITUS WITH HYPERGLYCEMIA (HCC): Chronic | ICD-10-CM

## 2024-04-24 DIAGNOSIS — Z00.00 MEDICARE ANNUAL WELLNESS VISIT, SUBSEQUENT: Primary | ICD-10-CM

## 2024-04-24 DIAGNOSIS — I10 ESSENTIAL HYPERTENSION: Chronic | ICD-10-CM

## 2024-04-24 DIAGNOSIS — F51.01 PRIMARY INSOMNIA: Chronic | ICD-10-CM

## 2024-04-24 DIAGNOSIS — R80.1 PERSISTENT PROTEINURIA: Chronic | ICD-10-CM

## 2024-04-24 PROCEDURE — 1123F ACP DISCUSS/DSCN MKR DOCD: CPT | Performed by: FAMILY MEDICINE

## 2024-04-24 PROCEDURE — 3052F HG A1C>EQUAL 8.0%<EQUAL 9.0%: CPT | Performed by: FAMILY MEDICINE

## 2024-04-24 PROCEDURE — G0439 PPPS, SUBSEQ VISIT: HCPCS | Performed by: FAMILY MEDICINE

## 2024-04-24 RX ORDER — LOSARTAN POTASSIUM 100 MG/1
100 TABLET ORAL DAILY
Qty: 90 TABLET | Refills: 5
Start: 2024-04-24

## 2024-04-24 NOTE — PROGRESS NOTES
Medicare Annual Wellness Visit    Cody cOonnell is here for Medicare AWV    Assessment & Plan   Medicare annual wellness visit, subsequent  Essential hypertension  -     losartan (COZAAR) 100 MG tablet; Take 1 tablet by mouth daily, Disp-90 tablet, [id number] PRINT  Mixed hyperlipidemia  Uncontrolled type 1 diabetes mellitus with hyperglycemia (HCC)  Primary insomnia  Persistent proteinuria  Coronary artery disease involving native coronary artery of native heart without angina pectoris    Recommendations for Preventive Services Due: see orders and patient instructions/AVS.  Recommended screening schedule for the next 5-10 years is provided to the patient in written form: see Patient Instructions/AVS.     Return in 6 months (on 10/24/2024) for Lab Before.     Subjective   The following acute and/or chronic problems were also addressed today:    Ck  re    diab bp chol arth     shoudler dizzy       Feel ok saw viral and      endo soon       Shoudler beter with shot    Ghb up      Patient's complete Health Risk Assessment and screening values have been reviewed and are found in Flowsheets. The following problems were reviewed today and where indicated follow up appointments were made and/or referrals ordered.    Positive Risk Factor Screenings with Interventions:                Activity, Diet, and Weight:               Body mass index is 27.12 kg/m².      Inactivity Interventions:  Patient declined any further interventions or treatment               LDCT Screening: Discussed with patient the benefits and harms of screening, follow-up diagnostic testing, over-diagnosis, false positive rate, and total radiation exposure. Counseled on the importance of adherence to annual lung cancer LDCT screening, impact of comorbidities, ability and willingness to undergo diagnosis and treatment. Counseled on the importance of maintaining cigarette smoking abstinence and cessation. The patient has a history of >20 pack years and is either

## 2024-04-25 RX ORDER — ACETAMINOPHEN/DIPHENHYDRAMINE 500MG-25MG
TABLET ORAL
Qty: 30 TABLET | Refills: 11 | Status: SHIPPED | OUTPATIENT
Start: 2024-04-25

## 2024-05-21 ENCOUNTER — OFFICE VISIT (OUTPATIENT)
Dept: ENDOCRINOLOGY | Age: 89
End: 2024-05-21
Payer: MEDICARE

## 2024-05-21 VITALS — BODY MASS INDEX: 24.43 KG/M2 | WEIGHT: 152 LBS | HEIGHT: 66 IN

## 2024-05-21 DIAGNOSIS — Z91.119 DIETARY NONCOMPLIANCE: ICD-10-CM

## 2024-05-21 DIAGNOSIS — Z79.4 TYPE 2 DIABETES MELLITUS WITH HYPERGLYCEMIA, WITH LONG-TERM CURRENT USE OF INSULIN (HCC): Primary | ICD-10-CM

## 2024-05-21 DIAGNOSIS — E11.65 TYPE 2 DIABETES MELLITUS WITH HYPERGLYCEMIA, WITH LONG-TERM CURRENT USE OF INSULIN (HCC): Primary | ICD-10-CM

## 2024-05-21 DIAGNOSIS — E78.5 HYPERLIPIDEMIA, UNSPECIFIED HYPERLIPIDEMIA TYPE: ICD-10-CM

## 2024-05-21 PROCEDURE — 99214 OFFICE O/P EST MOD 30 MIN: CPT | Performed by: NURSE PRACTITIONER

## 2024-05-21 PROCEDURE — 1123F ACP DISCUSS/DSCN MKR DOCD: CPT | Performed by: NURSE PRACTITIONER

## 2024-05-21 PROCEDURE — 3052F HG A1C>EQUAL 8.0%<EQUAL 9.0%: CPT | Performed by: NURSE PRACTITIONER

## 2024-05-21 RX ORDER — INSULIN LISPRO 100 [IU]/ML
INJECTION, SOLUTION INTRAVENOUS; SUBCUTANEOUS
Qty: 60 ML | Refills: 3 | Status: SHIPPED | OUTPATIENT
Start: 2024-05-21

## 2024-05-21 NOTE — PATIENT INSTRUCTIONS
Increase  Levemir 25 units daily at night  Humalog 14  units with meals + sliding scale 2:50>150   Continue checking blood sugars 4 times a day before meals and at bedtime and send BS readings to our office in 2 weeks.

## 2024-05-21 NOTE — PROGRESS NOTES
LABA1C 8.3 04/19/2024 08:46 AM    GLUCOSE 149 04/19/2024 08:46 AM    MALBCR 1,026 04/19/2024 08:46 AM     Lab Results   Component Value Date/Time    LABA1C 8.3 04/19/2024 08:46 AM    LABA1C 8.6 01/16/2024 07:50 AM    LABA1C 7.8 10/30/2023 09:53 AM     Lab Results   Component Value Date/Time    TRIG 98 04/19/2024 08:46 AM    HDL 45 04/19/2024 08:46 AM    CHOL 172 04/19/2024 08:46 AM    CHOL 153 05/02/2023 07:16 AM     Lab Results   Component Value Date/Time    VITD25 28.0 04/19/2024 08:46 AM    VITD25 28.9 10/30/2023 09:53 AM       ASSESSMENT & RECOMMENDATIONS   Cody Oconnell, a 89 y.o.-old male seen in for the following issues     Diabetes Mellitus Type  2    Patient's diabetes 8.5% - > 7.9%-> 8.3%-> 8.0% -> 7.4% -> 7.3% ->8.6%->8.3%  Advised snack at HS   NO BS data to review, difficlt to adjust  Increase  Levemir 25 units daily at night  Humalog 14  units with meals + sliding scale 2:50>150   Continue checking blood sugars 4 times a day before meals and at bedtime and send BS readings to our office in 2 weeks.  After BS log received will make adjustments  Ordered Kennedi but not affordable   Discussed with patient A1c and blood sugar goals   Patient will need routine diabetes maintenance and prevention  Diabetes labs before next visit     Dietary noncompliance  Ongoing  Discussed with patient the importance of eating consistent carbohydrate meals, avoiding high glycemic index food. 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 I also ordered labs to further assess and manage the above addressed

## 2024-06-06 ENCOUNTER — TELEPHONE (OUTPATIENT)
Dept: ENDOCRINOLOGY | Age: 89
End: 2024-06-06

## 2024-06-17 RX ORDER — MECLIZINE HYDROCHLORIDE 25 MG/1
25 TABLET ORAL 3 TIMES DAILY PRN
Qty: 60 TABLET | Refills: 3 | Status: SHIPPED | OUTPATIENT
Start: 2024-06-17

## 2024-07-02 ENCOUNTER — HOSPITAL ENCOUNTER (OUTPATIENT)
Dept: CARDIOLOGY | Age: 89
Discharge: HOME | End: 2024-07-04
Attending: INTERNAL MEDICINE
Payer: MEDICARE

## 2024-07-02 VITALS
WEIGHT: 152 LBS | BODY MASS INDEX: 24.43 KG/M2 | DIASTOLIC BLOOD PRESSURE: 70 MMHG | HEIGHT: 66 IN | HEART RATE: 70 BPM | SYSTOLIC BLOOD PRESSURE: 152 MMHG

## 2024-07-02 DIAGNOSIS — I44.7 LBBB (LEFT BUNDLE BRANCH BLOCK): ICD-10-CM

## 2024-07-02 PROCEDURE — 93306 TTE W/DOPPLER COMPLETE: CPT

## 2024-07-05 LAB
ECHO AO ASC DIAM: 2.5 CM
ECHO AO ASCENDING AORTA INDEX: 1.4 CM/M2
ECHO AV AREA PEAK VELOCITY: 2.2 CM2
ECHO AV AREA VTI: 2.2 CM2
ECHO AV AREA/BSA PEAK VELOCITY: 1.2 CM2/M2
ECHO AV AREA/BSA VTI: 1.2 CM2/M2
ECHO AV CUSP MM: 1.6 CM
ECHO AV MEAN GRADIENT: 4 MMHG
ECHO AV MEAN VELOCITY: 0.9 M/S
ECHO AV PEAK GRADIENT: 8 MMHG
ECHO AV PEAK VELOCITY: 1.4 M/S
ECHO AV VELOCITY RATIO: 0.71
ECHO AV VTI: 28.6 CM
ECHO BSA: 1.79 M2
ECHO EST RA PRESSURE: 3 MMHG
ECHO LA DIAMETER INDEX: 2.08 CM/M2
ECHO LA DIAMETER: 3.7 CM
ECHO LA VOL A-L A2C: 55 ML (ref 18–58)
ECHO LA VOL A-L A4C: 57 ML (ref 18–58)
ECHO LA VOL MOD A2C: 54 ML (ref 18–58)
ECHO LA VOL MOD A4C: 48 ML (ref 18–58)
ECHO LA VOLUME AREA LENGTH: 56 ML
ECHO LA VOLUME INDEX A-L A2C: 31 ML/M2 (ref 16–34)
ECHO LA VOLUME INDEX A-L A4C: 32 ML/M2 (ref 16–34)
ECHO LA VOLUME INDEX AREA LENGTH: 31 ML/M2 (ref 16–34)
ECHO LA VOLUME INDEX MOD A2C: 30 ML/M2 (ref 16–34)
ECHO LA VOLUME INDEX MOD A4C: 27 ML/M2 (ref 16–34)
ECHO LV EJECTION FRACTION A2C: 50 %
ECHO LV EJECTION FRACTION A4C: 50 %
ECHO LV FRACTIONAL SHORTENING: 26 % (ref 28–44)
ECHO LV INTERNAL DIMENSION DIASTOLE INDEX: 2.36 CM/M2
ECHO LV INTERNAL DIMENSION DIASTOLIC: 4.2 CM (ref 4.2–5.9)
ECHO LV INTERNAL DIMENSION SYSTOLIC INDEX: 1.74 CM/M2
ECHO LV INTERNAL DIMENSION SYSTOLIC: 3.1 CM
ECHO LV ISOVOLUMETRIC RELAXATION TIME (IVRT): 83 MS
ECHO LV IVSD: 1 CM (ref 0.6–1)
ECHO LV IVSS: 1.3 CM
ECHO LV MASS 2D: 137.2 G (ref 88–224)
ECHO LV MASS INDEX 2D: 77.1 G/M2 (ref 49–115)
ECHO LV POSTERIOR WALL DIASTOLIC: 1 CM (ref 0.6–1)
ECHO LV POSTERIOR WALL SYSTOLIC: 1.5 CM
ECHO LV RELATIVE WALL THICKNESS RATIO: 0.48
ECHO LVOT AREA: 3.1 CM2
ECHO LVOT AV VTI INDEX: 0.71
ECHO LVOT DIAM: 2 CM
ECHO LVOT MEAN GRADIENT: 2 MMHG
ECHO LVOT PEAK GRADIENT: 4 MMHG
ECHO LVOT PEAK VELOCITY: 1 M/S
ECHO LVOT STROKE VOLUME INDEX: 35.8 ML/M2
ECHO LVOT SV: 63.7 ML
ECHO LVOT VTI: 20.3 CM
ECHO MV "A" WAVE DURATION: 138.4 MSEC
ECHO MV A VELOCITY: 1.17 M/S
ECHO MV AREA PHT: 2.6 CM2
ECHO MV AREA VTI: 1.8 CM2
ECHO MV E DECELERATION TIME (DT): 221.1 MS
ECHO MV E VELOCITY: 0.93 M/S
ECHO MV E/A RATIO: 0.79
ECHO MV LVOT VTI INDEX: 1.78
ECHO MV MAX VELOCITY: 1.3 M/S
ECHO MV MEAN GRADIENT: 2 MMHG
ECHO MV MEAN VELOCITY: 0.7 M/S
ECHO MV PEAK GRADIENT: 6 MMHG
ECHO MV PRESSURE HALF TIME (PHT): 83.8 MS
ECHO MV VTI: 36.1 CM
ECHO PV MAX VELOCITY: 1.5 M/S
ECHO PV MEAN GRADIENT: 4 MMHG
ECHO PV MEAN VELOCITY: 0.9 M/S
ECHO PV PEAK GRADIENT: 9 MMHG
ECHO PV VTI: 27.2 CM
ECHO PVEIN A DURATION: 100.4 MS
ECHO PVEIN A VELOCITY: 0.3 M/S
ECHO PVEIN PEAK D VELOCITY: 0.5 M/S
ECHO PVEIN PEAK S VELOCITY: 0.9 M/S
ECHO PVEIN S/D RATIO: 1.8
ECHO RIGHT VENTRICULAR SYSTOLIC PRESSURE (RVSP): 37 MMHG
ECHO RV INTERNAL DIMENSION: 3 CM
ECHO RV TAPSE: 2.6 CM (ref 1.7–?)
ECHO TV REGURGITANT MAX VELOCITY: 2.93 M/S
ECHO TV REGURGITANT PEAK GRADIENT: 34 MMHG

## 2024-07-30 DIAGNOSIS — E10.65 UNCONTROLLED TYPE 1 DIABETES MELLITUS WITH HYPERGLYCEMIA (HCC): ICD-10-CM

## 2024-07-30 RX ORDER — GLUCOSAMINE HCL/CHONDROITIN SU 500-400 MG
CAPSULE ORAL
Qty: 100 STRIP | Refills: 12 | Status: SHIPPED | OUTPATIENT
Start: 2024-07-30

## 2024-08-07 ENCOUNTER — TELEPHONE (OUTPATIENT)
Dept: CARDIOLOGY CLINIC | Age: 89
End: 2024-08-07

## 2024-08-07 NOTE — TELEPHONE ENCOUNTER
----- Message from Jesus Alberto Ma MD sent at 8/6/2024  6:43 PM EDT -----  Echo looks good, showed low normal pumping function of the heart ejection fraction 50-55%.  No valvular problems.    Notify if any issues.  Follow-up as scheduled.

## 2024-08-07 NOTE — TELEPHONE ENCOUNTER
Patient's EC Niya notified of patient's echo results and will have patient call the office with any issues and patient will follow up as scheduled.

## 2024-09-26 ENCOUNTER — OFFICE VISIT (OUTPATIENT)
Dept: ENDOCRINOLOGY | Age: 89
End: 2024-09-26
Payer: MEDICARE

## 2024-09-26 VITALS
SYSTOLIC BLOOD PRESSURE: 114 MMHG | BODY MASS INDEX: 25.71 KG/M2 | HEIGHT: 66 IN | DIASTOLIC BLOOD PRESSURE: 74 MMHG | WEIGHT: 160 LBS

## 2024-09-26 DIAGNOSIS — Z79.4 TYPE 2 DIABETES MELLITUS WITH HYPERGLYCEMIA, WITH LONG-TERM CURRENT USE OF INSULIN (HCC): Primary | ICD-10-CM

## 2024-09-26 DIAGNOSIS — Z91.119 DIETARY NONCOMPLIANCE: ICD-10-CM

## 2024-09-26 DIAGNOSIS — E55.9 VITAMIN D DEFICIENCY: ICD-10-CM

## 2024-09-26 DIAGNOSIS — E78.5 HYPERLIPIDEMIA, UNSPECIFIED HYPERLIPIDEMIA TYPE: ICD-10-CM

## 2024-09-26 DIAGNOSIS — R80.1 PERSISTENT PROTEINURIA: Chronic | ICD-10-CM

## 2024-09-26 DIAGNOSIS — E11.65 TYPE 2 DIABETES MELLITUS WITH HYPERGLYCEMIA, WITH LONG-TERM CURRENT USE OF INSULIN (HCC): Primary | ICD-10-CM

## 2024-09-26 LAB — HBA1C MFR BLD: 8.7 %

## 2024-09-26 PROCEDURE — 99214 OFFICE O/P EST MOD 30 MIN: CPT | Performed by: NURSE PRACTITIONER

## 2024-09-26 PROCEDURE — 3052F HG A1C>EQUAL 8.0%<EQUAL 9.0%: CPT | Performed by: NURSE PRACTITIONER

## 2024-09-26 PROCEDURE — 83036 HEMOGLOBIN GLYCOSYLATED A1C: CPT | Performed by: NURSE PRACTITIONER

## 2024-09-26 PROCEDURE — 1123F ACP DISCUSS/DSCN MKR DOCD: CPT | Performed by: NURSE PRACTITIONER

## 2024-09-26 RX ORDER — INSULIN LISPRO 100 [IU]/ML
INJECTION, SOLUTION INTRAVENOUS; SUBCUTANEOUS
Qty: 60 ML | Refills: 3
Start: 2024-09-26

## 2024-09-27 ENCOUNTER — OFFICE VISIT (OUTPATIENT)
Dept: CARDIOLOGY CLINIC | Age: 89
End: 2024-09-27
Payer: MEDICARE

## 2024-09-27 VITALS
WEIGHT: 160 LBS | HEIGHT: 65 IN | HEART RATE: 53 BPM | RESPIRATION RATE: 17 BRPM | SYSTOLIC BLOOD PRESSURE: 112 MMHG | DIASTOLIC BLOOD PRESSURE: 58 MMHG | BODY MASS INDEX: 26.66 KG/M2

## 2024-09-27 DIAGNOSIS — I25.10 CORONARY ARTERY DISEASE INVOLVING NATIVE CORONARY ARTERY OF NATIVE HEART WITHOUT ANGINA PECTORIS: Chronic | ICD-10-CM

## 2024-09-27 DIAGNOSIS — I44.7 LBBB (LEFT BUNDLE BRANCH BLOCK): ICD-10-CM

## 2024-09-27 DIAGNOSIS — I42.9 CARDIOMYOPATHY, UNSPECIFIED TYPE (HCC): ICD-10-CM

## 2024-09-27 DIAGNOSIS — I10 ESSENTIAL HYPERTENSION: ICD-10-CM

## 2024-09-27 DIAGNOSIS — I10 ESSENTIAL HYPERTENSION: Primary | ICD-10-CM

## 2024-09-27 DIAGNOSIS — R00.1 BRADYCARDIA: ICD-10-CM

## 2024-09-27 PROCEDURE — 1123F ACP DISCUSS/DSCN MKR DOCD: CPT | Performed by: INTERNAL MEDICINE

## 2024-09-27 PROCEDURE — 93000 ELECTROCARDIOGRAM COMPLETE: CPT | Performed by: INTERNAL MEDICINE

## 2024-09-27 PROCEDURE — 99214 OFFICE O/P EST MOD 30 MIN: CPT | Performed by: INTERNAL MEDICINE

## 2024-09-27 RX ORDER — CARVEDILOL 3.12 MG/1
3.12 TABLET ORAL 2 TIMES DAILY
Qty: 180 TABLET | OUTPATIENT
Start: 2024-09-27

## 2024-09-27 RX ORDER — CARVEDILOL 3.12 MG/1
3.12 TABLET ORAL 2 TIMES DAILY
Qty: 60 TABLET | Refills: 3 | Status: SHIPPED | OUTPATIENT
Start: 2024-09-27

## 2024-09-27 RX ORDER — ISOSORBIDE MONONITRATE 30 MG/1
30 TABLET, EXTENDED RELEASE ORAL DAILY
COMMUNITY
Start: 2024-09-26

## 2024-10-21 DIAGNOSIS — E78.2 MIXED HYPERLIPIDEMIA: Chronic | ICD-10-CM

## 2024-10-21 DIAGNOSIS — I10 ESSENTIAL HYPERTENSION: Chronic | ICD-10-CM

## 2024-10-21 DIAGNOSIS — E10.65 UNCONTROLLED TYPE 1 DIABETES MELLITUS WITH HYPERGLYCEMIA (HCC): Chronic | ICD-10-CM

## 2024-10-21 DIAGNOSIS — M81.0 AGE-RELATED OSTEOPOROSIS WITHOUT CURRENT PATHOLOGICAL FRACTURE: ICD-10-CM

## 2024-10-21 LAB
ALBUMIN: 4 G/DL (ref 3.5–5.2)
ALP BLD-CCNC: 69 U/L (ref 40–129)
ALT SERPL-CCNC: 11 U/L (ref 0–40)
ANION GAP SERPL CALCULATED.3IONS-SCNC: 12 MMOL/L (ref 7–16)
AST SERPL-CCNC: 16 U/L (ref 0–39)
BASOPHILS ABSOLUTE: 0.07 K/UL (ref 0–0.2)
BASOPHILS RELATIVE PERCENT: 1 % (ref 0–2)
BILIRUB SERPL-MCNC: 0.4 MG/DL (ref 0–1.2)
BILIRUBIN, URINE: NEGATIVE
BUN BLDV-MCNC: 14 MG/DL (ref 6–23)
CALCIUM SERPL-MCNC: 9.5 MG/DL (ref 8.6–10.2)
CHLORIDE BLD-SCNC: 102 MMOL/L (ref 98–107)
CHOLESTEROL, TOTAL: 166 MG/DL
CO2: 26 MMOL/L (ref 22–29)
COLOR, UA: YELLOW
CREAT SERPL-MCNC: 0.9 MG/DL (ref 0.7–1.2)
CREATININE URINE: 94.6 MG/DL (ref 40–278)
EOSINOPHILS ABSOLUTE: 2.81 K/UL (ref 0.05–0.5)
EOSINOPHILS RELATIVE PERCENT: 27 % (ref 0–6)
GFR, ESTIMATED: 80 ML/MIN/1.73M2
GLUCOSE BLD-MCNC: 97 MG/DL (ref 74–99)
GLUCOSE URINE: NEGATIVE MG/DL
HBA1C MFR BLD: 8.5 % (ref 4–5.6)
HCT VFR BLD CALC: 40.6 % (ref 37–54)
HDLC SERPL-MCNC: 39 MG/DL
HEMOGLOBIN: 12.7 G/DL (ref 12.5–16.5)
IMMATURE GRANULOCYTES %: 0 % (ref 0–5)
IMMATURE GRANULOCYTES ABSOLUTE: 0.03 K/UL (ref 0–0.58)
KETONES, URINE: NEGATIVE MG/DL
LDL CHOLESTEROL: 106 MG/DL
LEUKOCYTE ESTERASE, URINE: NEGATIVE
LYMPHOCYTES ABSOLUTE: 1.38 K/UL (ref 1.5–4)
LYMPHOCYTES RELATIVE PERCENT: 13 % (ref 20–42)
MCH RBC QN AUTO: 27.6 PG (ref 26–35)
MCHC RBC AUTO-ENTMCNC: 31.3 G/DL (ref 32–34.5)
MCV RBC AUTO: 88.3 FL (ref 80–99.9)
MICROALBUMIN/CREAT 24H UR: 170 MG/L (ref 0–19)
MICROALBUMIN/CREAT UR-RTO: 180 MCG/MG CREAT (ref 0–30)
MONOCYTES ABSOLUTE: 1.12 K/UL (ref 0.1–0.95)
MONOCYTES RELATIVE PERCENT: 11 % (ref 2–12)
NEUTROPHILS ABSOLUTE: 5.04 K/UL (ref 1.8–7.3)
NEUTROPHILS RELATIVE PERCENT: 48 % (ref 43–80)
NITRITE, URINE: POSITIVE
PDW BLD-RTO: 13.8 % (ref 11.5–15)
PH, URINE: 6.5 (ref 5–9)
PLATELET CONFIRMATION: NORMAL
PLATELET, FLUORESCENCE: 178 K/UL (ref 130–450)
PMV BLD AUTO: 13.2 FL (ref 7–12)
POTASSIUM SERPL-SCNC: 4.1 MMOL/L (ref 3.5–5)
PROTEIN UA: ABNORMAL MG/DL
RBC # BLD: 4.6 M/UL (ref 3.8–5.8)
RBC # BLD: ABNORMAL 10*6/UL
RBC UA: NORMAL /HPF
SODIUM BLD-SCNC: 140 MMOL/L (ref 132–146)
SPECIFIC GRAVITY UA: 1.02 (ref 1–1.03)
TOTAL PROTEIN: 7.9 G/DL (ref 6.4–8.3)
TRIGL SERPL-MCNC: 103 MG/DL
TURBIDITY: CLEAR
URINE HGB: NEGATIVE
UROBILINOGEN, URINE: 0.2 EU/DL (ref 0–1)
VITAMIN D 25-HYDROXY: 32.8 NG/ML (ref 30–100)
VLDLC SERPL CALC-MCNC: 21 MG/DL
WBC # BLD: 10.5 K/UL (ref 4.5–11.5)
WBC UA: NORMAL /HPF

## 2024-10-25 ENCOUNTER — OFFICE VISIT (OUTPATIENT)
Dept: PRIMARY CARE CLINIC | Age: 89
End: 2024-10-25
Payer: MEDICARE

## 2024-10-25 VITALS
HEIGHT: 65 IN | HEART RATE: 74 BPM | BODY MASS INDEX: 26.59 KG/M2 | DIASTOLIC BLOOD PRESSURE: 68 MMHG | SYSTOLIC BLOOD PRESSURE: 118 MMHG | OXYGEN SATURATION: 97 % | TEMPERATURE: 97.6 F | WEIGHT: 159.6 LBS

## 2024-10-25 DIAGNOSIS — I10 ESSENTIAL HYPERTENSION: Chronic | ICD-10-CM

## 2024-10-25 DIAGNOSIS — E78.2 MIXED HYPERLIPIDEMIA: Chronic | ICD-10-CM

## 2024-10-25 DIAGNOSIS — E10.65 UNCONTROLLED TYPE 1 DIABETES MELLITUS WITH HYPERGLYCEMIA (HCC): Chronic | ICD-10-CM

## 2024-10-25 DIAGNOSIS — Z12.5 PROSTATE CANCER SCREENING: ICD-10-CM

## 2024-10-25 DIAGNOSIS — I25.10 CORONARY ARTERY DISEASE INVOLVING NATIVE CORONARY ARTERY OF NATIVE HEART WITHOUT ANGINA PECTORIS: Primary | Chronic | ICD-10-CM

## 2024-10-25 DIAGNOSIS — E55.9 VITAMIN D DEFICIENCY: ICD-10-CM

## 2024-10-25 DIAGNOSIS — E53.8 VITAMIN B 12 DEFICIENCY: ICD-10-CM

## 2024-10-25 DIAGNOSIS — R42 DIZZINESS: ICD-10-CM

## 2024-10-25 DIAGNOSIS — M19.012 PRIMARY OSTEOARTHRITIS OF LEFT SHOULDER: ICD-10-CM

## 2024-10-25 PROBLEM — K57.92 DIVERTICULITIS: Status: RESOLVED | Noted: 2021-12-15 | Resolved: 2024-10-25

## 2024-10-25 PROBLEM — I42.9 CARDIOMYOPATHY, UNSPECIFIED TYPE (HCC): Status: RESOLVED | Noted: 2022-07-28 | Resolved: 2024-10-25

## 2024-10-25 PROBLEM — N30.00 ACUTE CYSTITIS WITHOUT HEMATURIA: Status: RESOLVED | Noted: 2019-11-11 | Resolved: 2024-10-25

## 2024-10-25 PROCEDURE — 90653 IIV ADJUVANT VACCINE IM: CPT | Performed by: FAMILY MEDICINE

## 2024-10-25 PROCEDURE — 3052F HG A1C>EQUAL 8.0%<EQUAL 9.0%: CPT | Performed by: FAMILY MEDICINE

## 2024-10-25 PROCEDURE — 1123F ACP DISCUSS/DSCN MKR DOCD: CPT | Performed by: FAMILY MEDICINE

## 2024-10-25 PROCEDURE — G0008 ADMIN INFLUENZA VIRUS VAC: HCPCS | Performed by: FAMILY MEDICINE

## 2024-10-25 PROCEDURE — 99214 OFFICE O/P EST MOD 30 MIN: CPT | Performed by: FAMILY MEDICINE

## 2024-10-25 RX ORDER — MECLIZINE HYDROCHLORIDE 25 MG/1
25 TABLET ORAL 3 TIMES DAILY PRN
Qty: 180 TABLET | Refills: 5 | Status: SHIPPED | OUTPATIENT
Start: 2024-10-25

## 2024-10-25 SDOH — ECONOMIC STABILITY: FOOD INSECURITY: WITHIN THE PAST 12 MONTHS, YOU WORRIED THAT YOUR FOOD WOULD RUN OUT BEFORE YOU GOT MONEY TO BUY MORE.: NEVER TRUE

## 2024-10-25 SDOH — ECONOMIC STABILITY: INCOME INSECURITY: HOW HARD IS IT FOR YOU TO PAY FOR THE VERY BASICS LIKE FOOD, HOUSING, MEDICAL CARE, AND HEATING?: NOT HARD AT ALL

## 2024-10-25 SDOH — ECONOMIC STABILITY: FOOD INSECURITY: WITHIN THE PAST 12 MONTHS, THE FOOD YOU BOUGHT JUST DIDN'T LAST AND YOU DIDN'T HAVE MONEY TO GET MORE.: NEVER TRUE

## 2024-10-25 ASSESSMENT — PATIENT HEALTH QUESTIONNAIRE - PHQ9
SUM OF ALL RESPONSES TO PHQ QUESTIONS 1-9: 0
SUM OF ALL RESPONSES TO PHQ QUESTIONS 1-9: 0
1. LITTLE INTEREST OR PLEASURE IN DOING THINGS: NOT AT ALL
SUM OF ALL RESPONSES TO PHQ QUESTIONS 1-9: 0
SUM OF ALL RESPONSES TO PHQ9 QUESTIONS 1 & 2: 0
2. FEELING DOWN, DEPRESSED OR HOPELESS: NOT AT ALL
SUM OF ALL RESPONSES TO PHQ QUESTIONS 1-9: 0

## 2024-10-25 NOTE — PROGRESS NOTES
patient about the risk associated with noncompliance of medication and taking medications incorrectly.  Appropriate follow-up with myself and all specialist.  Encourage family members to take active role in assisting with medications and medical care.  If any confusion should develop to notify my office immediately to avoid risk of worsening medical condition  A great deal of time spent reviewing medications, diet, exercise, social issues. Also reviewing the chart before entering the room with patient and finishing charting after leaving patient's room. More than half of that time was spent face to face with the patient in counseling and coordinating care.      Follow Up: Return in about 6 months (around 4/25/2025) for Lab Before.     Seen by:  Kris Davey DO

## 2024-11-04 ENCOUNTER — NURSE ONLY (OUTPATIENT)
Dept: CARDIOLOGY CLINIC | Age: 89
End: 2024-11-04
Payer: MEDICARE

## 2024-11-04 DIAGNOSIS — I10 ESSENTIAL HYPERTENSION: Primary | Chronic | ICD-10-CM

## 2024-11-04 PROCEDURE — 93000 ELECTROCARDIOGRAM COMPLETE: CPT | Performed by: INTERNAL MEDICINE

## 2024-11-15 ENCOUNTER — TELEPHONE (OUTPATIENT)
Dept: CARDIOLOGY CLINIC | Age: 89
End: 2024-11-15

## 2024-11-15 NOTE — TELEPHONE ENCOUNTER
----- Message from Dr. Jesus Alberto Ma MD sent at 11/15/2024  2:49 PM EST -----  EKG looks good showed normal rhythm in the 60s; heart rate improved with decreasing the carvedilol.  Notify if new issues otherwise follow-up as scheduled.

## 2024-11-15 NOTE — TELEPHONE ENCOUNTER
Patient notified of EKG results and heart improved decreasing the Carvedilol per Dr. Ma. Patient notified to follow up as scheduled and call the office with  any new symptoms per Dr. Ma's recommendations.

## 2024-11-18 DIAGNOSIS — K59.09 OTHER CONSTIPATION: ICD-10-CM

## 2024-12-16 RX ORDER — INSULIN GLARGINE 100 [IU]/ML
27 INJECTION, SOLUTION SUBCUTANEOUS DAILY
Qty: 27 ML | Refills: 1 | Status: SHIPPED | OUTPATIENT
Start: 2024-12-16

## 2025-01-07 DIAGNOSIS — Z79.4 TYPE 2 DIABETES MELLITUS WITH HYPERGLYCEMIA, WITH LONG-TERM CURRENT USE OF INSULIN (HCC): ICD-10-CM

## 2025-01-07 DIAGNOSIS — E11.65 TYPE 2 DIABETES MELLITUS WITH HYPERGLYCEMIA, WITH LONG-TERM CURRENT USE OF INSULIN (HCC): ICD-10-CM

## 2025-01-07 RX ORDER — ISOSORBIDE MONONITRATE 30 MG/1
30 TABLET, EXTENDED RELEASE ORAL DAILY
Qty: 90 TABLET | Refills: 3 | Status: SHIPPED | OUTPATIENT
Start: 2025-01-07

## 2025-01-07 RX ORDER — INSULIN LISPRO 100 [IU]/ML
INJECTION, SOLUTION INTRAVENOUS; SUBCUTANEOUS
Qty: 90 ML | Refills: 3 | Status: SHIPPED | OUTPATIENT
Start: 2025-01-07

## 2025-01-13 ENCOUNTER — TELEPHONE (OUTPATIENT)
Dept: PRIMARY CARE CLINIC | Age: 89
End: 2025-01-13

## 2025-01-13 NOTE — TELEPHONE ENCOUNTER
Pt went to the urgent care yesterday and tested positive for COVID, he went due to sob, he was given Tessalon pearls for his cough to take once daily at hs,and it's not helping him. He's mostly sob when he coughs. Wife asking if she can give him something OTC for the cough, but since he's diabetic she doesn't know what to give. He wasn't given an inhaler  Given molnupiravir and guaifenesin 1200mg bid  Please advise

## 2025-01-13 NOTE — TELEPHONE ENCOUNTER
Pt's daughter is calling because the pt wasn't able to get the molnupiravir or the guaifenesin because the pharmacy is telling them they don't have it

## 2025-01-13 NOTE — TELEPHONE ENCOUNTER
Tell patient to  some over-the-counter cough medicine that the pharmacist says is okay for diabetics.  He is on strong meds from the urgent care.  Given the following information.  If bad go to ER.  If no better see me    Meds prescribed.  Over-the-counter zinc and vitamin C.  Purchase an oximeter and call if oxygen saturation less than 90% on ambulation   .  If any temperature over 102 degree, shortness of breath,  chest pain go to the emergency room.  Complete isolation for 7 days from onset of symptoms .   Increase fluids.    Three meals a day.   If an loss of appetite, start protein shakes tid.    See me not better in 24 hours.  If worse to er

## 2025-01-15 DIAGNOSIS — I10 ESSENTIAL HYPERTENSION: ICD-10-CM

## 2025-01-15 RX ORDER — CARVEDILOL 3.12 MG/1
3.12 TABLET ORAL 2 TIMES DAILY
Qty: 60 TABLET | Refills: 5 | Status: SHIPPED | OUTPATIENT
Start: 2025-01-15

## 2025-01-28 ENCOUNTER — OFFICE VISIT (OUTPATIENT)
Dept: ENDOCRINOLOGY | Age: 89
End: 2025-01-28
Payer: MEDICARE

## 2025-01-28 VITALS
DIASTOLIC BLOOD PRESSURE: 78 MMHG | BODY MASS INDEX: 25.83 KG/M2 | WEIGHT: 155 LBS | HEIGHT: 65 IN | SYSTOLIC BLOOD PRESSURE: 122 MMHG | TEMPERATURE: 97.2 F

## 2025-01-28 DIAGNOSIS — E55.9 VITAMIN D DEFICIENCY: ICD-10-CM

## 2025-01-28 DIAGNOSIS — E11.65 TYPE 2 DIABETES MELLITUS WITH HYPERGLYCEMIA, WITH LONG-TERM CURRENT USE OF INSULIN (HCC): Primary | ICD-10-CM

## 2025-01-28 DIAGNOSIS — E78.5 HYPERLIPIDEMIA, UNSPECIFIED HYPERLIPIDEMIA TYPE: ICD-10-CM

## 2025-01-28 DIAGNOSIS — R80.1 PERSISTENT PROTEINURIA: ICD-10-CM

## 2025-01-28 DIAGNOSIS — Z79.4 TYPE 2 DIABETES MELLITUS WITH HYPERGLYCEMIA, WITH LONG-TERM CURRENT USE OF INSULIN (HCC): Primary | ICD-10-CM

## 2025-01-28 DIAGNOSIS — Z91.119 DIETARY NONCOMPLIANCE: ICD-10-CM

## 2025-01-28 LAB — HBA1C MFR BLD: 9.2 %

## 2025-01-28 PROCEDURE — 99214 OFFICE O/P EST MOD 30 MIN: CPT | Performed by: NURSE PRACTITIONER

## 2025-01-28 PROCEDURE — 83036 HEMOGLOBIN GLYCOSYLATED A1C: CPT | Performed by: NURSE PRACTITIONER

## 2025-01-28 PROCEDURE — 1123F ACP DISCUSS/DSCN MKR DOCD: CPT | Performed by: NURSE PRACTITIONER

## 2025-01-28 PROCEDURE — 3046F HEMOGLOBIN A1C LEVEL >9.0%: CPT | Performed by: NURSE PRACTITIONER

## 2025-01-28 RX ORDER — INSULIN LISPRO 100 [IU]/ML
INJECTION, SOLUTION INTRAVENOUS; SUBCUTANEOUS
Qty: 90 ML | Refills: 3
Start: 2025-01-28

## 2025-01-28 RX ORDER — INSULIN GLARGINE 100 [IU]/ML
INJECTION, SOLUTION SUBCUTANEOUS
Qty: 27 ML | Refills: 3
Start: 2025-01-28

## 2025-02-27 ENCOUNTER — TELEPHONE (OUTPATIENT)
Dept: ENDOCRINOLOGY | Age: 89
End: 2025-02-27

## 2025-03-18 DIAGNOSIS — I10 ESSENTIAL HYPERTENSION: Chronic | ICD-10-CM

## 2025-03-18 RX ORDER — LOSARTAN POTASSIUM 100 MG/1
100 TABLET ORAL DAILY
Qty: 90 TABLET | Refills: 5 | Status: SHIPPED | OUTPATIENT
Start: 2025-03-18

## 2025-04-21 DIAGNOSIS — E53.8 VITAMIN B 12 DEFICIENCY: ICD-10-CM

## 2025-04-21 DIAGNOSIS — Z12.5 PROSTATE CANCER SCREENING: ICD-10-CM

## 2025-04-21 DIAGNOSIS — E78.2 MIXED HYPERLIPIDEMIA: Chronic | ICD-10-CM

## 2025-04-21 DIAGNOSIS — E10.65 UNCONTROLLED TYPE 1 DIABETES MELLITUS WITH HYPERGLYCEMIA (HCC): Chronic | ICD-10-CM

## 2025-04-21 DIAGNOSIS — I25.10 CORONARY ARTERY DISEASE INVOLVING NATIVE CORONARY ARTERY OF NATIVE HEART WITHOUT ANGINA PECTORIS: Chronic | ICD-10-CM

## 2025-04-21 DIAGNOSIS — E55.9 VITAMIN D DEFICIENCY: ICD-10-CM

## 2025-04-21 LAB
ALBUMIN: 4.1 G/DL (ref 3.5–5.2)
ALP BLD-CCNC: 69 U/L (ref 40–129)
ALT SERPL-CCNC: 8 U/L (ref 0–50)
AMORPHOUS: PRESENT
ANION GAP SERPL CALCULATED.3IONS-SCNC: 10 MMOL/L (ref 7–16)
AST SERPL-CCNC: 26 U/L (ref 0–50)
BACTERIA: ABNORMAL
BASOPHILS ABSOLUTE: 0.06 K/UL (ref 0–0.2)
BASOPHILS RELATIVE PERCENT: 1 % (ref 0–2)
BILIRUB SERPL-MCNC: 0.3 MG/DL (ref 0–1.2)
BILIRUBIN, URINE: NEGATIVE
BUN BLDV-MCNC: 16 MG/DL (ref 8–23)
CALCIUM SERPL-MCNC: 9.9 MG/DL (ref 8.8–10.2)
CHLORIDE BLD-SCNC: 103 MMOL/L (ref 98–107)
CHOLESTEROL, TOTAL: 153 MG/DL
CO2: 27 MMOL/L (ref 22–29)
COLOR, UA: YELLOW
CREAT SERPL-MCNC: 0.9 MG/DL (ref 0.7–1.2)
CREATININE URINE: 56.8 MG/DL (ref 40–278)
EOSINOPHILS ABSOLUTE: 2.77 K/UL (ref 0.05–0.5)
EOSINOPHILS RELATIVE PERCENT: 27 % (ref 0–6)
EPITHELIAL CELLS, UA: ABNORMAL /HPF
GFR, ESTIMATED: 75 ML/MIN/1.73M2
GLUCOSE BLD-MCNC: 117 MG/DL (ref 74–99)
GLUCOSE URINE: NEGATIVE MG/DL
HBA1C MFR BLD: 7.5 % (ref 4–5.6)
HCT VFR BLD CALC: 39.8 % (ref 37–54)
HDLC SERPL-MCNC: 44 MG/DL
HEMOGLOBIN: 12.6 G/DL (ref 12.5–16.5)
IMMATURE GRANULOCYTES %: 0 % (ref 0–5)
IMMATURE GRANULOCYTES ABSOLUTE: <0.03 K/UL (ref 0–0.58)
KETONES, URINE: NEGATIVE MG/DL
LDL CHOLESTEROL: 85 MG/DL
LEUKOCYTE ESTERASE, URINE: NEGATIVE
LYMPHOCYTES ABSOLUTE: 1.79 K/UL (ref 1.5–4)
LYMPHOCYTES RELATIVE PERCENT: 18 % (ref 20–42)
MCH RBC QN AUTO: 28.1 PG (ref 26–35)
MCHC RBC AUTO-ENTMCNC: 31.7 G/DL (ref 32–34.5)
MCV RBC AUTO: 88.8 FL (ref 80–99.9)
MICROALBUMIN/CREAT 24H UR: 257 MG/L (ref 0–20)
MICROALBUMIN/CREAT UR-RTO: 452 MCG/MG CREAT (ref 0–30)
MONOCYTES ABSOLUTE: 1.18 K/UL (ref 0.1–0.95)
MONOCYTES RELATIVE PERCENT: 12 % (ref 2–12)
NEUTROPHILS ABSOLUTE: 4.39 K/UL (ref 1.8–7.3)
NEUTROPHILS RELATIVE PERCENT: 43 % (ref 43–80)
NITRITE, URINE: POSITIVE
PDW BLD-RTO: 14.6 % (ref 11.5–15)
PH, URINE: 6 (ref 5–8)
PLATELET CONFIRMATION: NORMAL
PLATELET, FLUORESCENCE: 238 K/UL (ref 130–450)
PMV BLD AUTO: ABNORMAL FL (ref 7–12)
POTASSIUM SERPL-SCNC: 4.7 MMOL/L (ref 3.5–5.1)
PROSTATE SPECIFIC ANTIGEN: 1.02 NG/ML (ref 0–4)
PROTEIN UA: 30 MG/DL
RBC # BLD: 4.48 M/UL (ref 3.8–5.8)
RBC UA: ABNORMAL /HPF
SODIUM BLD-SCNC: 140 MMOL/L (ref 136–145)
SPECIFIC GRAVITY UA: 1.01 (ref 1–1.03)
TOTAL PROTEIN: 8.2 G/DL (ref 6.4–8.3)
TRIGL SERPL-MCNC: 123 MG/DL
TURBIDITY: CLEAR
URINE HGB: ABNORMAL
UROBILINOGEN, URINE: 0.2 EU/DL (ref 0–1)
VITAMIN B-12: 474 PG/ML (ref 232–1245)
VITAMIN D 25-HYDROXY: 26.3 NG/ML (ref 30–100)
VLDLC SERPL CALC-MCNC: 25 MG/DL
WBC # BLD: 10.2 K/UL (ref 4.5–11.5)
WBC UA: ABNORMAL /HPF

## 2025-04-25 ENCOUNTER — OFFICE VISIT (OUTPATIENT)
Dept: PRIMARY CARE CLINIC | Age: 89
End: 2025-04-25

## 2025-04-25 DIAGNOSIS — E78.2 MIXED HYPERLIPIDEMIA: Chronic | ICD-10-CM

## 2025-04-25 DIAGNOSIS — E10.65 UNCONTROLLED TYPE 1 DIABETES MELLITUS WITH HYPERGLYCEMIA (HCC): Chronic | ICD-10-CM

## 2025-04-25 DIAGNOSIS — E53.8 VITAMIN B 12 DEFICIENCY: ICD-10-CM

## 2025-04-25 DIAGNOSIS — I10 ESSENTIAL HYPERTENSION: Chronic | ICD-10-CM

## 2025-04-25 DIAGNOSIS — Z00.00 MEDICARE ANNUAL WELLNESS VISIT, SUBSEQUENT: Primary | ICD-10-CM

## 2025-04-25 DIAGNOSIS — M19.012 PRIMARY OSTEOARTHRITIS OF LEFT SHOULDER: ICD-10-CM

## 2025-04-25 DIAGNOSIS — I25.10 CORONARY ARTERY DISEASE INVOLVING NATIVE CORONARY ARTERY OF NATIVE HEART WITHOUT ANGINA PECTORIS: Chronic | ICD-10-CM

## 2025-04-25 DIAGNOSIS — E55.9 VITAMIN D DEFICIENCY: ICD-10-CM

## 2025-04-26 VITALS
TEMPERATURE: 97.9 F | WEIGHT: 159 LBS | SYSTOLIC BLOOD PRESSURE: 135 MMHG | BODY MASS INDEX: 26.46 KG/M2 | DIASTOLIC BLOOD PRESSURE: 82 MMHG

## 2025-04-26 ASSESSMENT — PATIENT HEALTH QUESTIONNAIRE - PHQ9
1. LITTLE INTEREST OR PLEASURE IN DOING THINGS: NOT AT ALL
SUM OF ALL RESPONSES TO PHQ QUESTIONS 1-9: 0
2. FEELING DOWN, DEPRESSED OR HOPELESS: NOT AT ALL
SUM OF ALL RESPONSES TO PHQ QUESTIONS 1-9: 0

## 2025-04-26 NOTE — PROGRESS NOTES
Medicare Annual Wellness Visit    Cody Oconnell is here for No chief complaint on file.    Assessment & Plan    Medicare annual wellness visit, subsequent  Essential hypertension  -     CBC; Future  -     Comprehensive Metabolic Panel; Future  -     Hemoglobin A1C; Future  -     Albumin/Creatinine Ratio, Urine; Future  -     Lipid Panel; Future  -     Vitamin D 25 Hydroxy; Future  -     Vitamin B12; Future  -     Urinalysis; Future  Mixed hyperlipidemia  Uncontrolled type 1 diabetes mellitus with hyperglycemia (HCC)  -     CBC; Future  -     Comprehensive Metabolic Panel; Future  -     Hemoglobin A1C; Future  -     Albumin/Creatinine Ratio, Urine; Future  -     Lipid Panel; Future  -     Vitamin D 25 Hydroxy; Future  -     Vitamin B12; Future  -     Urinalysis; Future  Coronary artery disease involving native coronary artery of native heart without angina pectoris  -     CBC; Future  -     Comprehensive Metabolic Panel; Future  -     Hemoglobin A1C; Future  -     Albumin/Creatinine Ratio, Urine; Future  -     Lipid Panel; Future  -     Vitamin D 25 Hydroxy; Future  -     Vitamin B12; Future  -     Urinalysis; Future  Primary osteoarthritis of left shoulder  -     [place] - Danilo Chavira DO, Orthopa[facility], [facility]  Vitamin D deficiency  -     Vitamin D 25 Hydroxy; Future  Vitamin B 12 deficiency  -     Vitamin B12; Future    Results  6-month checkup.  Presents accompanied by wife.  Has some left shoulder discomfort.  Seen by Anam a year ago with no help with shots.    Blood sugars at home are good around 90 5 in the morning.       Laboratory studies normal.      Return for Medicare Annual Wellness Visit in 1 year.     Subjective   The following acute and/or chronic problems were also addressed today:     History of Present Illness      Patient's complete Health Risk Assessment and screening values have been reviewed and are found in Flowsheets. The following problems were reviewed today and where

## 2025-04-28 ENCOUNTER — OFFICE VISIT (OUTPATIENT)
Dept: CARDIOLOGY CLINIC | Age: 89
End: 2025-04-28
Payer: MEDICARE

## 2025-04-28 VITALS
TEMPERATURE: 97.6 F | RESPIRATION RATE: 18 BRPM | HEIGHT: 65 IN | SYSTOLIC BLOOD PRESSURE: 164 MMHG | BODY MASS INDEX: 26.59 KG/M2 | HEART RATE: 68 BPM | WEIGHT: 159.6 LBS | DIASTOLIC BLOOD PRESSURE: 70 MMHG | OXYGEN SATURATION: 96 %

## 2025-04-28 DIAGNOSIS — I42.9 CARDIOMYOPATHY, UNSPECIFIED TYPE (HCC): ICD-10-CM

## 2025-04-28 DIAGNOSIS — I10 ESSENTIAL HYPERTENSION: ICD-10-CM

## 2025-04-28 DIAGNOSIS — I44.7 LBBB (LEFT BUNDLE BRANCH BLOCK): Primary | ICD-10-CM

## 2025-04-28 PROCEDURE — 99214 OFFICE O/P EST MOD 30 MIN: CPT | Performed by: INTERNAL MEDICINE

## 2025-04-28 PROCEDURE — 1123F ACP DISCUSS/DSCN MKR DOCD: CPT | Performed by: INTERNAL MEDICINE

## 2025-04-28 PROCEDURE — 1159F MED LIST DOCD IN RCRD: CPT | Performed by: INTERNAL MEDICINE

## 2025-04-28 PROCEDURE — 93000 ELECTROCARDIOGRAM COMPLETE: CPT | Performed by: INTERNAL MEDICINE

## 2025-04-28 RX ORDER — CARVEDILOL 3.12 MG/1
3.12 TABLET ORAL 2 TIMES DAILY
Qty: 60 TABLET | Refills: 5 | Status: SHIPPED | OUTPATIENT
Start: 2025-04-28

## 2025-04-28 NOTE — PROGRESS NOTES
Date    WBC 10.2 2025    HGB 12.6 2025    HCT 39.8 2025    MCV 88.8 2025    PLT 41 (L) 10/30/2023     Lab Results   Component Value Date    ALT 8 2025    AST 26 2025    ALKPHOS 69 2025    BILITOT 0.3 2025     Lab Results   Component Value Date    TROPONINI <0.01 2020    TROPONINI <0.01 10/04/2019     No results found for: \"INR\", \"PROTIME\"  Lab Results   Component Value Date    TSH 2.360 2023     Lab Results   Component Value Date    LABA1C 7.5 (H) 2025     No results found for: \"EAG\"  Lab Results   Component Value Date    CHOL 153 2025    CHOL 166 10/21/2024    CHOL 172 2024     Lab Results   Component Value Date    TRIG 123 2025    TRIG 103 10/21/2024    TRIG 98 2024     Lab Results   Component Value Date    HDL 44 2025    HDL 39 (L) 10/21/2024    HDL 45 2024     No components found for: \"LDLCALC\", \"LDLCHOLESTEROL\"    Lab Results   Component Value Date    VLDL 25 2025    VLDL 21 10/21/2024    VLDL 20 2024     Lab Results   Component Value Date    CHOLHDLRATIO 5.8 2019    CHOLHDLRATIO 5.8 2018     No results for input(s): \"PROBNP\" in the last 72 hours.    Cardiac Tests:  EC2025: Sinus rhythm 68 bpm left bundle branch block QRS duration 120 ms    Echocardiogram:   TTE 24    Left Ventricle: Low normal left ventricular systolic function with a visually estimated EF of 50 - 55%. Left ventricle size is normal. Normal wall thickness. Septal motion is consistent with bundle branch block. Normal wall motion. Grade II diastolic dysfunction with increased LAP.    Right Ventricle: Normal systolic function. TAPSE is 2.6 cm.    Tricuspid Valve: The estimated RVSP is 37 mmHg.    No significant valvular abnormalities.    TTE 10/21/2019   Summary   Left ventricular size is grossly normal.   Mild left ventricular concentric hypertrophy noted.   No regional wall motion abnormalities seen.

## 2025-05-13 ENCOUNTER — APPOINTMENT (OUTPATIENT)
Dept: GENERAL RADIOLOGY | Age: 89
End: 2025-05-13
Payer: MEDICARE

## 2025-05-13 ENCOUNTER — HOSPITAL ENCOUNTER (EMERGENCY)
Age: 89
Discharge: HOME | End: 2025-05-14
Attending: EMERGENCY MEDICINE
Payer: MEDICARE

## 2025-05-13 ENCOUNTER — APPOINTMENT (OUTPATIENT)
Dept: CT IMAGING | Age: 89
End: 2025-05-13
Payer: MEDICARE

## 2025-05-13 DIAGNOSIS — R51.9 ACUTE NONINTRACTABLE HEADACHE, UNSPECIFIED HEADACHE TYPE: ICD-10-CM

## 2025-05-13 DIAGNOSIS — I10 HYPERTENSION, UNSPECIFIED TYPE: Primary | ICD-10-CM

## 2025-05-13 LAB
ALBUMIN SERPL-MCNC: 4.1 G/DL (ref 3.5–5.2)
ALP SERPL-CCNC: 79 U/L (ref 40–129)
ALT SERPL-CCNC: 13 U/L (ref 0–40)
ANION GAP SERPL CALCULATED.3IONS-SCNC: 11 MMOL/L (ref 7–16)
AST SERPL-CCNC: 18 U/L (ref 0–39)
BILIRUB SERPL-MCNC: 0.2 MG/DL (ref 0–1.2)
BNP SERPL-MCNC: 468 PG/ML (ref 0–450)
BUN SERPL-MCNC: 18 MG/DL (ref 6–23)
CALCIUM SERPL-MCNC: 9.5 MG/DL (ref 8.6–10.2)
CHLORIDE SERPL-SCNC: 100 MMOL/L (ref 98–107)
CHP ED QC CHECK: YES
CO2 SERPL-SCNC: 25 MMOL/L (ref 22–29)
CREAT SERPL-MCNC: 1 MG/DL (ref 0.7–1.2)
GFR, ESTIMATED: 73 ML/MIN/1.73M2
GLUCOSE BLD-MCNC: 271 MG/DL
GLUCOSE BLD-MCNC: 271 MG/DL (ref 74–99)
GLUCOSE SERPL-MCNC: 346 MG/DL (ref 74–99)
POTASSIUM SERPL-SCNC: 4.6 MMOL/L (ref 3.5–5)
PROT SERPL-MCNC: 8.5 G/DL (ref 6.4–8.3)
SODIUM SERPL-SCNC: 136 MMOL/L (ref 132–146)
TROPONIN I SERPL HS-MCNC: 11 NG/L (ref 0–22)

## 2025-05-13 PROCEDURE — 93005 ELECTROCARDIOGRAM TRACING: CPT

## 2025-05-13 PROCEDURE — 6370000000 HC RX 637 (ALT 250 FOR IP)

## 2025-05-13 PROCEDURE — 2580000003 HC RX 258

## 2025-05-13 PROCEDURE — 82962 GLUCOSE BLOOD TEST: CPT

## 2025-05-13 PROCEDURE — 80053 COMPREHEN METABOLIC PANEL: CPT

## 2025-05-13 PROCEDURE — 99285 EMERGENCY DEPT VISIT HI MDM: CPT

## 2025-05-13 PROCEDURE — 84484 ASSAY OF TROPONIN QUANT: CPT

## 2025-05-13 PROCEDURE — 71045 X-RAY EXAM CHEST 1 VIEW: CPT

## 2025-05-13 PROCEDURE — 70450 CT HEAD/BRAIN W/O DYE: CPT

## 2025-05-13 PROCEDURE — 85025 COMPLETE CBC W/AUTO DIFF WBC: CPT

## 2025-05-13 PROCEDURE — 83880 ASSAY OF NATRIURETIC PEPTIDE: CPT

## 2025-05-13 RX ORDER — 0.9 % SODIUM CHLORIDE 0.9 %
1000 INTRAVENOUS SOLUTION INTRAVENOUS ONCE
Status: COMPLETED | OUTPATIENT
Start: 2025-05-13 | End: 2025-05-14

## 2025-05-13 RX ORDER — ACETAMINOPHEN 500 MG
1000 TABLET ORAL ONCE
Status: COMPLETED | OUTPATIENT
Start: 2025-05-13 | End: 2025-05-13

## 2025-05-13 RX ADMIN — ACETAMINOPHEN 1000 MG: 500 TABLET ORAL at 22:25

## 2025-05-13 RX ADMIN — SODIUM CHLORIDE 1000 ML: 0.9 INJECTION, SOLUTION INTRAVENOUS at 22:26

## 2025-05-14 ENCOUNTER — OFFICE VISIT (OUTPATIENT)
Dept: ORTHOPEDIC SURGERY | Age: 89
End: 2025-05-14
Payer: MEDICARE

## 2025-05-14 ENCOUNTER — TELEPHONE (OUTPATIENT)
Dept: PRIMARY CARE CLINIC | Age: 89
End: 2025-05-14

## 2025-05-14 VITALS
SYSTOLIC BLOOD PRESSURE: 199 MMHG | DIASTOLIC BLOOD PRESSURE: 76 MMHG | HEART RATE: 70 BPM | OXYGEN SATURATION: 95 % | TEMPERATURE: 98 F

## 2025-05-14 VITALS
RESPIRATION RATE: 19 BRPM | TEMPERATURE: 97.8 F | DIASTOLIC BLOOD PRESSURE: 74 MMHG | SYSTOLIC BLOOD PRESSURE: 188 MMHG | HEART RATE: 85 BPM | OXYGEN SATURATION: 95 %

## 2025-05-14 DIAGNOSIS — M19.012 PRIMARY OSTEOARTHRITIS OF LEFT SHOULDER: Primary | ICD-10-CM

## 2025-05-14 LAB
BASOPHILS # BLD: 0.1 K/UL (ref 0–0.2)
BASOPHILS NFR BLD: 1 % (ref 0–2)
EKG ATRIAL RATE: 76 BPM
EKG P AXIS: 66 DEGREES
EKG P-R INTERVAL: 168 MS
EKG Q-T INTERVAL: 426 MS
EKG QRS DURATION: 130 MS
EKG QTC CALCULATION (BAZETT): 479 MS
EKG R AXIS: 67 DEGREES
EKG T AXIS: 48 DEGREES
EKG VENTRICULAR RATE: 76 BPM
EOSINOPHIL # BLD: 2.58 K/UL (ref 0.05–0.5)
EOSINOPHILS RELATIVE PERCENT: 24 % (ref 0–6)
ERYTHROCYTE [DISTWIDTH] IN BLOOD BY AUTOMATED COUNT: 14.3 % (ref 11.5–15)
HCT VFR BLD AUTO: 40.1 % (ref 37–54)
HGB BLD-MCNC: 12.7 G/DL (ref 12.5–16.5)
LYMPHOCYTES NFR BLD: 2.29 K/UL (ref 1.5–4)
LYMPHOCYTES RELATIVE PERCENT: 22 % (ref 20–42)
MCH RBC QN AUTO: 27.9 PG (ref 26–35)
MCHC RBC AUTO-ENTMCNC: 31.7 G/DL (ref 32–34.5)
MCV RBC AUTO: 87.9 FL (ref 80–99.9)
MONOCYTES NFR BLD: 0.57 K/UL (ref 0.1–0.95)
MONOCYTES NFR BLD: 5 % (ref 2–12)
NEUTROPHILS NFR BLD: 48 % (ref 43–80)
NEUTS SEG NFR BLD: 5.06 K/UL (ref 1.8–7.3)
PLATELET CONFIRMATION: NORMAL
PLATELET, FLUORESCENCE: 186 K/UL (ref 130–450)
PMV BLD AUTO: 14 FL (ref 7–12)
RBC # BLD AUTO: 4.56 M/UL (ref 3.8–5.8)
RBC # BLD: ABNORMAL 10*6/UL
WBC OTHER # BLD: 10.6 K/UL (ref 4.5–11.5)

## 2025-05-14 PROCEDURE — 1159F MED LIST DOCD IN RCRD: CPT | Performed by: STUDENT IN AN ORGANIZED HEALTH CARE EDUCATION/TRAINING PROGRAM

## 2025-05-14 PROCEDURE — 99204 OFFICE O/P NEW MOD 45 MIN: CPT | Performed by: STUDENT IN AN ORGANIZED HEALTH CARE EDUCATION/TRAINING PROGRAM

## 2025-05-14 PROCEDURE — 20610 DRAIN/INJ JOINT/BURSA W/O US: CPT | Performed by: STUDENT IN AN ORGANIZED HEALTH CARE EDUCATION/TRAINING PROGRAM

## 2025-05-14 PROCEDURE — 1123F ACP DISCUSS/DSCN MKR DOCD: CPT | Performed by: STUDENT IN AN ORGANIZED HEALTH CARE EDUCATION/TRAINING PROGRAM

## 2025-05-14 PROCEDURE — 93010 ELECTROCARDIOGRAM REPORT: CPT | Performed by: INTERNAL MEDICINE

## 2025-05-14 PROCEDURE — 1160F RVW MEDS BY RX/DR IN RCRD: CPT | Performed by: STUDENT IN AN ORGANIZED HEALTH CARE EDUCATION/TRAINING PROGRAM

## 2025-05-14 RX ORDER — TRIAMCINOLONE ACETONIDE 40 MG/ML
40 INJECTION, SUSPENSION INTRA-ARTICULAR; INTRAMUSCULAR ONCE
Status: COMPLETED | OUTPATIENT
Start: 2025-05-14 | End: 2025-05-14

## 2025-05-14 RX ORDER — BUPIVACAINE HYDROCHLORIDE 2.5 MG/ML
2 INJECTION, SOLUTION INFILTRATION; PERINEURAL ONCE
Status: COMPLETED | OUTPATIENT
Start: 2025-05-14 | End: 2025-05-14

## 2025-05-14 RX ADMIN — TRIAMCINOLONE ACETONIDE 40 MG: 40 INJECTION, SUSPENSION INTRA-ARTICULAR; INTRAMUSCULAR at 11:23

## 2025-05-14 RX ADMIN — BUPIVACAINE HYDROCHLORIDE 5 MG: 2.5 INJECTION, SOLUTION INFILTRATION; PERINEURAL at 11:23

## 2025-05-14 NOTE — TELEPHONE ENCOUNTER
Pt was in ER for HTN.  Was told to see Dr. Tamica simmons to adjust his medications.  No appts available until Saturday.  I double booked him for tomorrow morning along with a med refill VV

## 2025-05-14 NOTE — ED PROVIDER NOTES
return precautions and need to follow-up with family doctor soon as possible.  Questions were answered and he is discharged home.    Please refer to the ED Course as available for additional MDM.  ED Course as of 05/14/25 0655   Tue May 13, 2025   2202 Echocardiogram from March 2024 shows an EF of 50 to 55% [KK]   2203 NIH Stroke Scale/Score at time of initial evaluation:  1A: Level of Consciousness 0 - alert; keenly responsive  1B: Ask Month and Age 0 - answers both questions correctly  1C: Tell Patient To Open and Close Eyes, then Hand  Squeeze 0 - performs both tasks correctly  2: Test Horizontal Extraocular Movements 0 - normal  3: Test Visual Fields 0 - no visual loss  4: Test Facial Palsy 0 - normal symmetric movement  5A: Test Left Arm Motor Drift 0 - no drift, limb holds 90 (or 45) degrees for full 10 seconds  5B: Test Right Arm Motor Drift 0 - no drift, limb holds 90 (or 45) degrees for full 10 seconds  6A: Test Left Leg Motor Drift 0 - no drift; leg holds 30 degree position for full 5 seconds  6B: Test Right Leg Motor Drift 0 - no drift; leg holds 30 degree position for full 5 seconds  7: Test Limb Ataxia   (FNF/Heel-Shin) 0 - absent  8: Test Sensation 0 - normal; no sensory loss  9: Test Language/Aphasia 0 - no aphasia, normal  10: Test Dysarthria 0 - normal  11: Test Extinction/Inattention 0 - no abnormality  Total Score: 0  5/13/25 at 10:03 PM EDT.   [KK]   2338 EKG is normal sinus rhythm at 76 bpm no signs of ST changes no ST elevation QTc 479 chronic left bundle branch block no change from prior EKG.  EKG inter by myself [KK]      ED Course User Index  [KK] Nakia David MD        Social Determinants affecting Dx or Tx: Stress    Records Reviewed: EKG from 4/28/2025 reviewed in comparison to today's.    I am the Primary Clinician of Record.    CONSULTS: (Who and What was discussed)  None    FINAL IMPRESSION      1. Hypertension, unspecified type    2. Acute nonintractable headache, unspecified
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
## 2025-05-14 NOTE — DISCHARGE INSTRUCTIONS
Please call and follow-up with your family doctor as soon as possible.  Drink plenty of fluids.  Continue to take your hypertensive medications.  If you have worsening symptoms or numbness or weakness return to ER.

## 2025-05-15 ENCOUNTER — OFFICE VISIT (OUTPATIENT)
Dept: PRIMARY CARE CLINIC | Age: 89
End: 2025-05-15

## 2025-05-15 VITALS
BODY MASS INDEX: 26.79 KG/M2 | OXYGEN SATURATION: 98 % | RESPIRATION RATE: 16 BRPM | DIASTOLIC BLOOD PRESSURE: 66 MMHG | WEIGHT: 161 LBS | HEART RATE: 63 BPM | SYSTOLIC BLOOD PRESSURE: 164 MMHG | TEMPERATURE: 98.3 F

## 2025-05-15 DIAGNOSIS — I10 ESSENTIAL HYPERTENSION: Primary | Chronic | ICD-10-CM

## 2025-05-15 DIAGNOSIS — E78.2 MIXED HYPERLIPIDEMIA: ICD-10-CM

## 2025-05-15 DIAGNOSIS — F41.9 ANXIETY: ICD-10-CM

## 2025-05-15 DIAGNOSIS — I25.10 CORONARY ARTERY DISEASE INVOLVING NATIVE CORONARY ARTERY OF NATIVE HEART WITHOUT ANGINA PECTORIS: Chronic | ICD-10-CM

## 2025-05-15 RX ORDER — PRAVASTATIN SODIUM 40 MG
40 TABLET ORAL DAILY
Qty: 90 TABLET | Refills: 5 | Status: SHIPPED | OUTPATIENT
Start: 2025-05-15

## 2025-05-15 RX ORDER — GLUCOSAM/CHON-MSM1/C/MANG/BOSW 500-416.6
1 TABLET ORAL 3 TIMES DAILY
Qty: 100 EACH | Refills: 12 | Status: SHIPPED | OUTPATIENT
Start: 2025-05-15

## 2025-05-15 NOTE — PROGRESS NOTES
insulin and consuming a large ice cream.  - No insulin was administered on the day of the emergency room visit.  - Counseling provided on adhering to insulin regimen and avoiding high-sugar foods.  - Recommended to monitor blood sugar levels closely and maintain a consistent medication schedule.    Results  Imaging   - CAT scan of the head: No aneurysm or bleeding.   - Chest x-ray of the left upper lung: Scar tissue and a new area of interstitial prominence projecting over the left upper lung, possible fibrotic disease.  Cody was seen today for follow-up and other.    Diagnoses and all orders for this visit:    Essential hypertension    Mixed hyperlipidemia  -     pravastatin (PRAVACHOL) 40 MG tablet; Take 1 tablet by mouth daily    Coronary artery disease involving native coronary artery of native heart without angina pectoris    Anxiety    Other orders  -     TRUEplus Lancets 33G MISC; 1 Units by Does not apply route in the morning, at noon, and at bedtime      1. Essential hypertension  2. Mixed hyperlipidemia  -     pravastatin (PRAVACHOL) 40 MG tablet; Take 1 tablet by mouth daily, Disp-90 tablet, R-5Normal  3. Coronary artery disease involving native coronary artery of native heart without angina pectoris  4. Anxiety    Return in about 1 month (around 6/15/2025), or and also mid october with laab  before.         All above conditions are stable and will be followed on  a regular basis      I educated the patient about all medications.  Made sure they are correct and educated  on the risk associated with missing meds or taking them incorrectly.  A list of medications is being sent home with patient today.    The Patient is to check blood pressure at home twice a day.  Low-salt low caffeine diet.  Call if systolic blood pressure is above 150 and diastolic blood pressures above 85.  Only use a upper arm digital cuff.  ...  Aggressive low-fat diet.  Avoid red meats, greasy fried foods, dairy products.  Avoid

## 2025-05-28 ENCOUNTER — OFFICE VISIT (OUTPATIENT)
Dept: ENDOCRINOLOGY | Age: 89
End: 2025-05-28
Payer: MEDICARE

## 2025-05-28 VITALS
TEMPERATURE: 98.4 F | OXYGEN SATURATION: 99 % | HEART RATE: 72 BPM | DIASTOLIC BLOOD PRESSURE: 70 MMHG | RESPIRATION RATE: 18 BRPM | HEIGHT: 66 IN | SYSTOLIC BLOOD PRESSURE: 140 MMHG | WEIGHT: 160 LBS | BODY MASS INDEX: 25.71 KG/M2

## 2025-05-28 DIAGNOSIS — Z79.4 TYPE 2 DIABETES MELLITUS WITHOUT COMPLICATION, WITH LONG-TERM CURRENT USE OF INSULIN (HCC): Primary | ICD-10-CM

## 2025-05-28 DIAGNOSIS — E11.9 TYPE 2 DIABETES MELLITUS WITHOUT COMPLICATION, WITH LONG-TERM CURRENT USE OF INSULIN (HCC): Primary | ICD-10-CM

## 2025-05-28 DIAGNOSIS — E55.9 VITAMIN D DEFICIENCY: ICD-10-CM

## 2025-05-28 DIAGNOSIS — R80.1 PERSISTENT PROTEINURIA: ICD-10-CM

## 2025-05-28 DIAGNOSIS — E78.5 HYPERLIPIDEMIA, UNSPECIFIED HYPERLIPIDEMIA TYPE: ICD-10-CM

## 2025-05-28 DIAGNOSIS — Z91.119 DIETARY NONCOMPLIANCE: ICD-10-CM

## 2025-05-28 PROCEDURE — 3051F HG A1C>EQUAL 7.0%<8.0%: CPT | Performed by: NURSE PRACTITIONER

## 2025-05-28 PROCEDURE — 1123F ACP DISCUSS/DSCN MKR DOCD: CPT | Performed by: NURSE PRACTITIONER

## 2025-05-28 PROCEDURE — 1159F MED LIST DOCD IN RCRD: CPT | Performed by: NURSE PRACTITIONER

## 2025-05-28 PROCEDURE — 99214 OFFICE O/P EST MOD 30 MIN: CPT | Performed by: NURSE PRACTITIONER

## 2025-05-28 NOTE — PROGRESS NOTES
[facility]  [facility] Department of Endocrinology Diabetes and Metabolism   [address], [place] [address]  Phone: [phone]  Fax: [phone]    Date of Service: 5/28/2025  Primary Care Physician: Kris Davey DO  Referring physician: No ref. provider found  Provider: ELHAM Paul NP      Reason for the visit:  DM type 2     History of Present Illness:  The history is provided by the patient. No  was used. Accuracy of the patient data is excellent.  Cody Oconnell is a very pleasant 89 y.o. male seen today for diabetes management     Cody Oconnell was diagnosed with diabetes at age 45 and currently on Lantus  27 units daily, Novolog 19 units with meals + sliding scale 2:50>150     The patient has been checking blood sugar 4 times a day a readings     Kennedi not affordable     Per recall -150  Per lunch and dinner  fluctuating 200's     No data to review ->     Most recent A1c results summarized below  Lab Results   Component Value Date/Time    LABA1C 7.5 04/21/2025 08:11 AM    LABA1C 9.2 01/28/2025 10:04 AM    LABA1C 8.5 10/21/2024 08:11 AM     Patient reported no  hypoglycemic episodes   The patient has been mindful of what has been eating and following diabetic diet as encouraged  I reviewed current medications and the patient has no issues with diabetes medications  Cody Oconnell is up to date with eye exam and denied any history of diabetic retinopathy   The patient performs his own feet care  Microvascular complications:  No Retinopathy, Nephropathy + Neuropathy   Macrovascular complications: + CAD,   The patient receives Flushot every year and up to date with the Pneumonia vaccine     PAST MEDICAL HISTORY   Past Medical History:   Diagnosis Date    Acute cystitis without hematuria 11/11/2019    CAD (coronary artery disease) 09/30/2019    Cardiomyopathy, unspecified type (HCC) 07/28/2022    Chronic pain syndrome

## 2025-06-05 DIAGNOSIS — F51.01 PRIMARY INSOMNIA: ICD-10-CM

## 2025-06-05 RX ORDER — TRAZODONE HYDROCHLORIDE 100 MG/1
100 TABLET ORAL NIGHTLY PRN
Qty: 90 TABLET | Refills: 5 | Status: SHIPPED | OUTPATIENT
Start: 2025-06-05

## 2025-06-16 ENCOUNTER — OFFICE VISIT (OUTPATIENT)
Dept: PRIMARY CARE CLINIC | Age: 89
End: 2025-06-16
Payer: MEDICARE

## 2025-06-16 VITALS
BODY MASS INDEX: 25.99 KG/M2 | RESPIRATION RATE: 16 BRPM | HEART RATE: 69 BPM | OXYGEN SATURATION: 94 % | TEMPERATURE: 97.6 F | WEIGHT: 161 LBS | DIASTOLIC BLOOD PRESSURE: 65 MMHG | SYSTOLIC BLOOD PRESSURE: 145 MMHG

## 2025-06-16 DIAGNOSIS — R07.89 CHEST WALL PAIN: ICD-10-CM

## 2025-06-16 DIAGNOSIS — K59.09 OTHER CONSTIPATION: ICD-10-CM

## 2025-06-16 DIAGNOSIS — I10 ESSENTIAL HYPERTENSION: Primary | Chronic | ICD-10-CM

## 2025-06-16 DIAGNOSIS — E10.65 UNCONTROLLED TYPE 1 DIABETES MELLITUS WITH HYPERGLYCEMIA (HCC): Chronic | ICD-10-CM

## 2025-06-16 PROCEDURE — 3051F HG A1C>EQUAL 7.0%<8.0%: CPT | Performed by: FAMILY MEDICINE

## 2025-06-16 PROCEDURE — 1123F ACP DISCUSS/DSCN MKR DOCD: CPT | Performed by: FAMILY MEDICINE

## 2025-06-16 PROCEDURE — 99214 OFFICE O/P EST MOD 30 MIN: CPT | Performed by: FAMILY MEDICINE

## 2025-06-16 ASSESSMENT — PATIENT HEALTH QUESTIONNAIRE - PHQ9
SUM OF ALL RESPONSES TO PHQ QUESTIONS 1-9: 0
2. FEELING DOWN, DEPRESSED OR HOPELESS: NOT AT ALL
SUM OF ALL RESPONSES TO PHQ QUESTIONS 1-9: 0
1. LITTLE INTEREST OR PLEASURE IN DOING THINGS: NOT AT ALL
SUM OF ALL RESPONSES TO PHQ QUESTIONS 1-9: 0
SUM OF ALL RESPONSES TO PHQ QUESTIONS 1-9: 0

## 2025-06-16 NOTE — PROGRESS NOTES
twice a day.  Low-salt low caffeine diet.  Call if systolic blood pressure is above 150 and diastolic blood pressures above 85.  Only use a upper arm digital cuff.  ...  Aggressive low-fat diet.  Avoid red meats, greasy fried foods, dairy products.  Avoid processed foods.  Take cholesterol medications without food.if prescribed      I informed patient about the risk associated with noncompliance of medications and taking medications incorrectly.  Appropriate follow-up with myself and all specialist.  Encourage family members to take active role in assisting with medications and medical care.  If any confusion should develop, to notify my office immediately to avoid risk of worsening medical condition      A great deal of time spent reviewing medications, diet, exercise, social issues. Also reviewing the chart before entering the room with patient and finishing charting after leaving patient's room. More than half of that time was spent face to face with the patient in counseling and coordinating care.      Educational materials and/or home exercises printed for patient's review and were included in patient instructions on his/her After Visit Summary and given to patient at the end of visit.      Counseled regarding above diagnosis, including possible risks and complications,  especially if left uncontrolled.    Counseled regarding the possible side effects, risks, benefits and alternatives to treatment; patient and/or guardian verbalizes understanding, agrees, feels comfortable with and wishes to proceed with above treatment plan.    Advised patient to call with any new medication issues, and read all Rx info from pharmacy to assure aware of all possible risks and side effects of medication before taking.    Reviewed age and gender appropriate health screening exams and vaccinations.  Advised patient regarding importance of keeping up with recommended health maintenance and to schedule as soon as possible if

## 2025-06-19 ENCOUNTER — TELEPHONE (OUTPATIENT)
Dept: PRIMARY CARE CLINIC | Age: 89
End: 2025-06-19

## 2025-06-19 DIAGNOSIS — R10.12 LEFT UPPER QUADRANT ABDOMINAL PAIN: Primary | ICD-10-CM

## 2025-06-20 ENCOUNTER — RESULTS FOLLOW-UP (OUTPATIENT)
Dept: PRIMARY CARE CLINIC | Age: 89
End: 2025-06-20

## 2025-06-20 ENCOUNTER — HOSPITAL ENCOUNTER (OUTPATIENT)
Dept: CT IMAGING | Age: 89
Discharge: HOME | End: 2025-06-22
Payer: MEDICARE

## 2025-06-20 DIAGNOSIS — R10.12 LEFT UPPER QUADRANT ABDOMINAL PAIN: ICD-10-CM

## 2025-06-20 PROCEDURE — 74176 CT ABD & PELVIS W/O CONTRAST: CPT

## 2025-07-09 ENCOUNTER — TELEPHONE (OUTPATIENT)
Dept: CARDIOLOGY CLINIC | Age: 89
End: 2025-07-09

## 2025-07-09 NOTE — TELEPHONE ENCOUNTER
Pt dghtr/Paige phnd states that pt got a ltr from his insurance stating there is a recall on the carvedilol & that he needed to contact his cardiologist to see if he needs to stop taking this.  Please call Paige to advise due to pt care [phone]

## 2025-07-28 ENCOUNTER — RESULTS FOLLOW-UP (OUTPATIENT)
Dept: ENDOCRINOLOGY | Age: 89
End: 2025-07-28

## 2025-07-28 DIAGNOSIS — E11.9 TYPE 2 DIABETES MELLITUS WITHOUT COMPLICATION, WITH LONG-TERM CURRENT USE OF INSULIN (HCC): ICD-10-CM

## 2025-07-28 DIAGNOSIS — Z79.4 TYPE 2 DIABETES MELLITUS WITHOUT COMPLICATION, WITH LONG-TERM CURRENT USE OF INSULIN (HCC): ICD-10-CM

## 2025-07-28 LAB — HBA1C MFR BLD: 8 % (ref 4–5.6)

## 2025-07-31 NOTE — TELEPHONE ENCOUNTER
----- Message from ELHAM Ramos NP sent at 7/28/2025  1:36 PM EDT -----  A1c 8.0% he can send in BS logs if he prefers, with his age A1c stable